# Patient Record
Sex: FEMALE | Race: OTHER | HISPANIC OR LATINO | Employment: FULL TIME | URBAN - METROPOLITAN AREA
[De-identification: names, ages, dates, MRNs, and addresses within clinical notes are randomized per-mention and may not be internally consistent; named-entity substitution may affect disease eponyms.]

---

## 2017-07-25 ENCOUNTER — OFFICE VISIT (OUTPATIENT)
Dept: URGENT CARE | Facility: MEDICAL CENTER | Age: 32
End: 2017-07-25
Payer: MEDICARE

## 2017-07-25 PROCEDURE — 99283 EMERGENCY DEPT VISIT LOW MDM: CPT

## 2017-07-25 PROCEDURE — G0382 LEV 3 HOSP TYPE B ED VISIT: HCPCS

## 2019-02-05 ENCOUNTER — TELEPHONE (OUTPATIENT)
Dept: OBGYN CLINIC | Facility: CLINIC | Age: 34
End: 2019-02-05

## 2021-11-02 ENCOUNTER — OFFICE VISIT (OUTPATIENT)
Dept: URGENT CARE | Facility: MEDICAL CENTER | Age: 36
End: 2021-11-02
Payer: MEDICARE

## 2021-11-02 VITALS
DIASTOLIC BLOOD PRESSURE: 86 MMHG | RESPIRATION RATE: 18 BRPM | SYSTOLIC BLOOD PRESSURE: 156 MMHG | HEART RATE: 80 BPM | OXYGEN SATURATION: 96 % | HEIGHT: 64 IN | TEMPERATURE: 98.2 F | BODY MASS INDEX: 38.41 KG/M2 | WEIGHT: 225 LBS

## 2021-11-02 DIAGNOSIS — R11.0 NAUSEA: ICD-10-CM

## 2021-11-02 DIAGNOSIS — K52.9 NONINFECTIOUS GASTROENTERITIS, UNSPECIFIED TYPE: Primary | ICD-10-CM

## 2021-11-02 LAB
SL AMB  POCT GLUCOSE, UA: NORMAL
SL AMB LEUKOCYTE ESTERASE,UA: NORMAL
SL AMB POCT BILIRUBIN,UA: NORMAL
SL AMB POCT BLOOD,UA: NORMAL
SL AMB POCT CLARITY,UA: CLEAR
SL AMB POCT COLOR,UA: YELLOW
SL AMB POCT KETONES,UA: NORMAL
SL AMB POCT NITRITE,UA: NORMAL
SL AMB POCT PH,UA: 7
SL AMB POCT SPECIFIC GRAVITY,UA: 1.01
SL AMB POCT URINE HCG: NEGATIVE
SL AMB POCT URINE PROTEIN: NORMAL
SL AMB POCT UROBILINOGEN: 0.2

## 2021-11-02 PROCEDURE — 81025 URINE PREGNANCY TEST: CPT

## 2021-11-02 PROCEDURE — 81002 URINALYSIS NONAUTO W/O SCOPE: CPT

## 2021-11-02 PROCEDURE — 99203 OFFICE O/P NEW LOW 30 MIN: CPT | Performed by: PHYSICIAN ASSISTANT

## 2021-11-02 RX ORDER — ONDANSETRON 4 MG/1
4 TABLET, FILM COATED ORAL EVERY 8 HOURS PRN
Qty: 20 TABLET | Refills: 0 | Status: SHIPPED | OUTPATIENT
Start: 2021-11-02

## 2022-10-14 ENCOUNTER — APPOINTMENT (OUTPATIENT)
Dept: PHYSICAL THERAPY | Facility: CLINIC | Age: 37
End: 2022-10-14

## 2023-12-11 ENCOUNTER — APPOINTMENT (EMERGENCY)
Dept: ULTRASOUND IMAGING | Facility: HOSPITAL | Age: 38
End: 2023-12-11
Payer: COMMERCIAL

## 2023-12-11 ENCOUNTER — HOSPITAL ENCOUNTER (EMERGENCY)
Facility: HOSPITAL | Age: 38
Discharge: HOME | End: 2023-12-11
Attending: EMERGENCY MEDICINE
Payer: COMMERCIAL

## 2023-12-11 VITALS
HEART RATE: 77 BPM | DIASTOLIC BLOOD PRESSURE: 57 MMHG | SYSTOLIC BLOOD PRESSURE: 116 MMHG | RESPIRATION RATE: 16 BRPM | TEMPERATURE: 98.5 F | OXYGEN SATURATION: 99 %

## 2023-12-11 DIAGNOSIS — O20.0 THREATENED ABORTION: ICD-10-CM

## 2023-12-11 DIAGNOSIS — Z34.90 INTRAUTERINE PREGNANCY: ICD-10-CM

## 2023-12-11 DIAGNOSIS — R10.30 LOWER ABDOMINAL PAIN: Primary | ICD-10-CM

## 2023-12-11 LAB
ALBUMIN SERPL BCP-MCNC: 4 G/DL (ref 3.5–5)
ALP SERPL-CCNC: 66 U/L (ref 34–104)
ALT SERPL W P-5'-P-CCNC: 22 U/L (ref 7–52)
ANION GAP SERPL CALCULATED.3IONS-SCNC: 7 MMOL/L
AST SERPL W P-5'-P-CCNC: 13 U/L (ref 13–39)
B-HCG SERPL-ACNC: 9310 MIU/ML (ref 0–5)
BASOPHILS # BLD AUTO: 0.05 THOUSANDS/ÂΜL (ref 0–0.1)
BASOPHILS NFR BLD AUTO: 0 % (ref 0–1)
BILIRUB SERPL-MCNC: 0.26 MG/DL (ref 0.2–1)
BILIRUB UR QL STRIP: NEGATIVE
BUN SERPL-MCNC: 10 MG/DL (ref 5–25)
CALCIUM SERPL-MCNC: 9.2 MG/DL (ref 8.4–10.2)
CHLORIDE SERPL-SCNC: 104 MMOL/L (ref 96–108)
CLARITY UR: CLEAR
CO2 SERPL-SCNC: 25 MMOL/L (ref 21–32)
COLOR UR: NORMAL
CREAT SERPL-MCNC: 0.68 MG/DL (ref 0.6–1.3)
EOSINOPHIL # BLD AUTO: 0.3 THOUSAND/ÂΜL (ref 0–0.61)
EOSINOPHIL NFR BLD AUTO: 2 % (ref 0–6)
ERYTHROCYTE [DISTWIDTH] IN BLOOD BY AUTOMATED COUNT: 17.3 % (ref 11.6–15.1)
GFR SERPL CREATININE-BSD FRML MDRD: 112 ML/MIN/1.73SQ M
GLUCOSE SERPL-MCNC: 145 MG/DL (ref 65–140)
GLUCOSE UR STRIP-MCNC: NEGATIVE MG/DL
HCT VFR BLD AUTO: 44 % (ref 34.8–46.1)
HGB BLD-MCNC: 14.6 G/DL (ref 11.5–15.4)
HGB UR QL STRIP.AUTO: NEGATIVE
IMM GRANULOCYTES # BLD AUTO: 0.1 THOUSAND/UL (ref 0–0.2)
IMM GRANULOCYTES NFR BLD AUTO: 1 % (ref 0–2)
KETONES UR STRIP-MCNC: NEGATIVE MG/DL
LEUKOCYTE ESTERASE UR QL STRIP: NEGATIVE
LIPASE SERPL-CCNC: 12 U/L (ref 11–82)
LYMPHOCYTES # BLD AUTO: 4.43 THOUSANDS/ÂΜL (ref 0.6–4.47)
LYMPHOCYTES NFR BLD AUTO: 29 % (ref 14–44)
MCH RBC QN AUTO: 28.2 PG (ref 26.8–34.3)
MCHC RBC AUTO-ENTMCNC: 33.2 G/DL (ref 31.4–37.4)
MCV RBC AUTO: 85 FL (ref 82–98)
MONOCYTES # BLD AUTO: 0.56 THOUSAND/ÂΜL (ref 0.17–1.22)
MONOCYTES NFR BLD AUTO: 4 % (ref 4–12)
NEUTROPHILS # BLD AUTO: 9.95 THOUSANDS/ÂΜL (ref 1.85–7.62)
NEUTS SEG NFR BLD AUTO: 64 % (ref 43–75)
NITRITE UR QL STRIP: NEGATIVE
NRBC BLD AUTO-RTO: 0 /100 WBCS
PH UR STRIP.AUTO: 5.5 [PH]
PLATELET # BLD AUTO: 269 THOUSANDS/UL (ref 149–390)
PMV BLD AUTO: 11 FL (ref 8.9–12.7)
POTASSIUM SERPL-SCNC: 3.5 MMOL/L (ref 3.5–5.3)
PROT SERPL-MCNC: 7.2 G/DL (ref 6.4–8.4)
PROT UR STRIP-MCNC: NEGATIVE MG/DL
RBC # BLD AUTO: 5.18 MILLION/UL (ref 3.81–5.12)
SODIUM SERPL-SCNC: 136 MMOL/L (ref 135–147)
SP GR UR STRIP.AUTO: 1.03 (ref 1–1.03)
UROBILINOGEN UR STRIP-ACNC: <2 MG/DL
WBC # BLD AUTO: 15.39 THOUSAND/UL (ref 4.31–10.16)

## 2023-12-11 PROCEDURE — 76801 OB US < 14 WKS SINGLE FETUS: CPT

## 2023-12-11 PROCEDURE — 80053 COMPREHEN METABOLIC PANEL: CPT | Performed by: EMERGENCY MEDICINE

## 2023-12-11 PROCEDURE — 85025 COMPLETE CBC W/AUTO DIFF WBC: CPT | Performed by: EMERGENCY MEDICINE

## 2023-12-11 PROCEDURE — 81003 URINALYSIS AUTO W/O SCOPE: CPT | Performed by: PHYSICIAN ASSISTANT

## 2023-12-11 PROCEDURE — 87086 URINE CULTURE/COLONY COUNT: CPT | Performed by: PHYSICIAN ASSISTANT

## 2023-12-11 PROCEDURE — 99284 EMERGENCY DEPT VISIT MOD MDM: CPT

## 2023-12-11 PROCEDURE — 87661 TRICHOMONAS VAGINALIS AMPLIF: CPT | Performed by: PHYSICIAN ASSISTANT

## 2023-12-11 PROCEDURE — 87591 N.GONORRHOEAE DNA AMP PROB: CPT | Performed by: PHYSICIAN ASSISTANT

## 2023-12-11 PROCEDURE — 84702 CHORIONIC GONADOTROPIN TEST: CPT | Performed by: EMERGENCY MEDICINE

## 2023-12-11 PROCEDURE — 36415 COLL VENOUS BLD VENIPUNCTURE: CPT

## 2023-12-11 PROCEDURE — 87491 CHLMYD TRACH DNA AMP PROBE: CPT | Performed by: PHYSICIAN ASSISTANT

## 2023-12-11 PROCEDURE — 83690 ASSAY OF LIPASE: CPT | Performed by: EMERGENCY MEDICINE

## 2023-12-11 PROCEDURE — 87563 M. GENITALIUM AMP PROBE: CPT | Performed by: PHYSICIAN ASSISTANT

## 2023-12-11 NOTE — ED PROVIDER NOTES
History  Chief Complaint   Patient presents with    Abdominal Pain     Pt presents with lower abdominal pain x1 day. Pt is approx 2 weeks pregnant with a positive pregnancy test on Dec 1st. Hx of miscarriages and ectopic pregnancies. Denies vaginal bleeding and discharge,        History provided by:  Patient  Abdominal Pain  Pain location:  Suprapubic  Pain quality: aching    Pain quality: not cramping    Pain radiates to:  Does not radiate  Pain severity:  Moderate  Onset quality:  Gradual  Duration:  2 days  Timing:  Constant  Progression:  Worsening  Chronicity:  New  Context: awakening from sleep    Context: not alcohol use, not diet changes, not eating, not laxative use, not medication withdrawal, not previous surgeries, not recent illness, not recent sexual activity, not recent travel, not retching, not sick contacts, not suspicious food intake and not trauma    Relieved by:  Nothing  Worsened by:  Nothing  Ineffective treatments:  None tried  Associated symptoms: no anorexia, no chills, no diarrhea, no dysuria, no fever, no melena, no vaginal bleeding and no vaginal discharge    Risk factors: pregnancy    Risk factors: no alcohol abuse, no aspirin use, not elderly, has not had multiple surgeries, no NSAID use, not obese and no recent hospitalization        Prior to Admission Medications   Prescriptions Last Dose Informant Patient Reported? Taking?   ondansetron (ZOFRAN) 4 mg tablet   No No   Sig: Take 1 tablet (4 mg total) by mouth every 8 (eight) hours as needed for nausea or vomiting      Facility-Administered Medications: None       Past Medical History:   Diagnosis Date    Ectopic pregnancy        Past Surgical History:   Procedure Laterality Date    APPENDECTOMY         History reviewed. No pertinent family history. I have reviewed and agree with the history as documented.     E-Cigarette/Vaping     E-Cigarette/Vaping Substances     Social History     Tobacco Use    Smoking status: Every Day Packs/day: 0.50     Types: Cigarettes    Smokeless tobacco: Never   Substance Use Topics    Alcohol use: Not Currently    Drug use: Never       Review of Systems   Constitutional:  Negative for chills and fever. Gastrointestinal:  Positive for abdominal pain. Negative for anorexia, diarrhea and melena. Genitourinary:  Negative for dysuria, vaginal bleeding and vaginal discharge. Physical Exam  Physical Exam    Vital Signs  ED Triage Vitals   Temperature Pulse Respirations Blood Pressure SpO2   12/11/23 1248 12/11/23 1248 12/11/23 1248 12/11/23 1248 12/11/23 1248   98.5 °F (36.9 °C) 66 18 144/70 99 %      Temp Source Heart Rate Source Patient Position - Orthostatic VS BP Location FiO2 (%)   12/11/23 1248 12/11/23 1248 12/11/23 1248 12/11/23 1248 --   Oral Monitor Sitting Right arm       Pain Score       12/11/23 1337       7           Vitals:    12/11/23 1248 12/11/23 1337 12/11/23 1400 12/11/23 1445   BP: 144/70 127/72 128/82 116/57   Pulse: 66 69 85 77   Patient Position - Orthostatic VS: Sitting            Visual Acuity      ED Medications  Medications - No data to display    Diagnostic Studies  Results Reviewed       Procedure Component Value Units Date/Time    Trichomonas vaginalis/Mycoplasma genitalium PCR [[id number]] Collected: 12/11/23 1622    Lab Status: In process Specimen: Urine, Clean Catch Updated: 12/11/23 1752    UA w Reflex to Microscopic w Reflex to Culture [[id number]] Collected: 12/11/23 1620    Lab Status: Final result Specimen: Urine, Clean Catch Updated: 12/11/23 1701     Color, UA Light Yellow     Clarity, UA Clear     Specific Gravity, UA 1.028     pH, UA 5.5     Leukocytes, UA Negative     Nitrite, UA Negative     Protein, UA Negative mg/dl      Glucose, UA Negative mg/dl      Ketones, UA Negative mg/dl      Urobilinogen, UA <2.0 mg/dl      Bilirubin, UA Negative     Occult Blood, UA Negative     URINE COMMENT --    Urine culture [[id number]] Collected: 12/11/23 1620    Lab Status:  In process Specimen: Urine, Clean Catch Updated: 12/11/23 1700    Chlamydia/GC amplified DNA by PCR [[id number]] Collected: 12/11/23 1622    Lab Status: In process Specimen: Urine, Other Updated: 12/11/23 1630    hCG, quantitative, pregnancy [[id number]]  (Abnormal) Collected: 12/11/23 1335    Lab Status: Final result Specimen: Blood from Arm, Right Updated: 12/11/23 1445     HCG, Quant 9,310 mIU/mL     Narrative:       Expected Ranges:    HCG results between 5 and 25 mIU/mL may be indicative of early pregnancy but should be interpreted in light of the total clinical presentation. HCG can rise to detectable levels in malick and post menopausal women (0-11.6 mIU/mL).      Approximate               Approximate HCG  Gestation age          Concentration ( mIU/mL)  _____________          ______________________   Duc Roch                      HCG values  0.2-1                       5-50  1-2                           2-3                         100-5000  3-4                         500-58933  4-5                         1000-37567  5-6                         [id number]  6-8                         82092-004012  8-12                        [id number]      Comprehensive metabolic panel [[id number]]  (Abnormal) Collected: 12/11/23 1335    Lab Status: Final result Specimen: Blood from Arm, Right Updated: 12/11/23 1417     Sodium 136 mmol/L      Potassium 3.5 mmol/L      Chloride 104 mmol/L      CO2 25 mmol/L      ANION GAP 7 mmol/L      BUN 10 mg/dL      Creatinine 0.68 mg/dL      Glucose 145 mg/dL      Calcium 9.2 mg/dL      AST 13 U/L      ALT 22 U/L      Alkaline Phosphatase 66 U/L      Total Protein 7.2 g/dL      Albumin 4.0 g/dL      Total Bilirubin 0.26 mg/dL      eGFR 112 ml/min/1.73sq m     Narrative:      [place] guidelines for Chronic Kidney Disease (CKD):     Stage 1 with normal or high GFR (GFR > 90 mL/min/1.73 square meters)    Stage 2 Mild CKD (GFR = 60-89 mL/min/1.73 square meters)    Stage 3A Moderate CKD (GFR = 45-59 mL/min/1.73 square meters)    Stage 3B Moderate CKD (GFR = 30-44 mL/min/1.73 square meters)    Stage 4 Severe CKD (GFR = 15-29 mL/min/1.73 square meters)    Stage 5 End Stage CKD (GFR <15 mL/min/1.73 square meters)  Note: GFR calculation is accurate only with a steady state creatinine    Lipase [[id number]]  (Normal) Collected: 12/11/23 1335    Lab Status: Final result Specimen: Blood from Arm, Right Updated: 12/11/23 1417     Lipase 12 u/L     CBC and differential [[id number]]  (Abnormal) Collected: 12/11/23 1335    Lab Status: Final result Specimen: Blood from Arm, Right Updated: 12/11/23 1350     WBC 15.39 Thousand/uL      RBC 5.18 Million/uL      Hemoglobin 14.6 g/dL      Hematocrit 44.0 %      MCV 85 fL      MCH 28.2 pg      MCHC 33.2 g/dL      RDW 17.3 %      MPV 11.0 fL      Platelets 497 Thousands/uL      nRBC 0 /100 WBCs      Neutrophils Relative 64 %      Immat GRANS % 1 %      Lymphocytes Relative 29 %      Monocytes Relative 4 %      Eosinophils Relative 2 %      Basophils Relative 0 %      Neutrophils Absolute 9.95 Thousands/µL      Immature Grans Absolute 0.10 Thousand/uL      Lymphocytes Absolute 4.43 Thousands/µL      Monocytes Absolute 0.56 Thousand/µL      Eosinophils Absolute 0.30 Thousand/µL      Basophils Absolute 0.05 Thousands/µL                    US OB < 14 weeks with transvaginal   Final Result by Mary Lou Foster MD (12/11 1546)      Single live intrauterine gestation at 5 weeks 5 days (range +/- 2 days). VIBHA of 08/07/2024. Workstation performed: [id number]                    Procedures  Procedures         ED Course                               SBIRT 22yo+      Flowsheet Row Most Recent Value   Initial Alcohol Screen: US AUDIT-C     1. How often do you have a drink containing alcohol? 0 Filed at: 12/11/2023 1341   2. How many drinks containing alcohol do you have on a typical day you are drinking?   0 Filed at: 12/11/2023 1341   3b. FEMALE Any Age, or MALE 65+: How often do you have 4 or more drinks on one occassion? 0 Filed at: 2023 1341   Audit-C Score 0 Filed at: 2023 1341   MARIA ESTHER: How many times in the past year have you. .. Used an illegal drug or used a prescription medication for non-medical reasons? Never Filed at: 2023 1341                      Medical Decision Making  Patient presents to the emergency room with complaints of lower abdominal pain. She states that she is pregnant. She is unsure when her last period was. She denies any vaginal bleeding. She has had multiple pregnancies with previous ectopics. She has had 2 viable births1 age 10 and 3 age 18. Patient has had no prenatal care up to this point. Denies any fever or chills. Physical exam this 30-year-old  female is alert oriented x 3. She is in no acute distress. Her lungs are clear to auscultation. Heart is regular rate and rhythm without murmur. Her abdomen is soft nondistended with minimal tenderness palpated over the suprapubic area. There is no evidence of a mass or hepatosplenomegaly. No dependent edema. Ultrasound of the pelvis, transvaginal shows a intrauterine pregnancy at 5 weeks 5 days. Impression  Intrauterine pregnancy  Lower abdominal pain  Threatened AB    Plan-  Patient is good to make a follow-up appoint with her gynecologist.  She was given reasons to return to the emergency room should her symptoms worsen. Amount and/or Complexity of Data Reviewed  Labs: ordered. Details: Independently interpreted by me patient is O+. Her Rh and ABL was reviewed by me. Radiology: ordered. Details: Transvaginal ultrasound shows a viable intrauterine pregnancy. There is no adnexal mass identified.              Disposition  Final diagnoses:   Lower abdominal pain   Intrauterine pregnancy   Threatened      Time reflects when diagnosis was documented in both MDM as applicable and the Disposition within this note       Time User Action Codes Description Comment    2023  4:15 PM Nas Mckeon Add [R10.30] Lower abdominal pain     2023  4:15 PM Nas Mckeon Add [B40.09] Intrauterine pregnancy     2023  4:16 PM Nas Mckeon Add [O20.0] Threatened            ED Disposition       ED Disposition   Discharge    Condition   Stable    Date/Time   Mon Dec 11, 2023 [address] discharge to home/self care. Follow-up Information       Follow up With Specialties Details Why Contact Info    Your Ob Physician    Call and arrange an appointment  for next week            Discharge Medication List as of 2023  4:18 PM        CONTINUE these medications which have NOT CHANGED    Details   ondansetron (ZOFRAN) 4 mg tablet Take 1 tablet (4 mg total) by mouth every 8 (eight) hours as needed for nausea or vomiting, Starting 2021, Normal             No discharge procedures on file.     PDMP Review       None            ED Provider  Electronically Signed by             Norm Coyle PA-C  23

## 2023-12-11 NOTE — ED PROCEDURE NOTE
Procedure  POC Pelvic US    Date/Time: 12/11/2023 3:38 PM    Performed by: Irvin Garcia MD  Authorized by: Irvin Garcia MD    Patient location:  ED  Procedure performed by consultant: Medical Student Radha. Procedure details:     Exam Type:  Diagnostic    Indications: evaluate for IUP      Assessment for: confirm intrauterine pregnancy      Technique:  Transabdominal obstetric (HCG+) exam    Views obtained: uterus (transverse and sagittal) and pouch of Juan Antonio      Views obtained comment:  Limited views of adnexa    Image quality: limited diagnostic      Image availability:  Images available in PACS  Uterine findings:     Endometrial stripe: identified      Intrauterine pregnancy: identified      Yolk sac: identified      Fetal heart rate: identified (Limited visualization, unable to measure)    Right adnexa findings:     Right ovary:  Limited quality  Left adnexa findings:     Left ovary:  Limited quality  Other findings:     Free pelvic fluid: not identified    Interpretation:     Ultrasound impressions: normal      Pregnancy findings: intrauterine pregnancy (IUP)    Comments:      Gestational sac with yolk sac visualized in uterus. Limited view of adnexa. Patient tolerated exam well, no immediate issues. POC Biliary US    Date/Time: 12/11/2023 3:38 PM    Performed by: Irvin Garcia MD  Authorized by: Irvin Garcia MD    Patient location:  ED  Performed by:  Resident  Procedure performed by consultant: Medical chikis Garcia.     Procedure details:     Exam Type:  Diagnostic    Indications comment:  Abdominal pain    Assessment for:  Cholecystitis and cholelithiasis    Views obtained: gallbladder (transverse and longitudinal)      Image quality: limited diagnostic      Image availability:  Images available in PACS  Findings:     Cholelithiasis: not identified      Common bile duct:  Unable to visualize    Gallbladder wall:  Normal    Gallbladder wall thickness (mm):  1 Pericholecystic fluid: not identified      Sonographic Norton's sign: negative    Interpretation:     Biliary ultrasound impressions: normal gallbladder ultrasound    Comments:      Patient tolerated exam well, no immediate issues. POC Renal US    Date/Time: 12/11/2023 3:38 PM    Performed by: Thais Peña MD  Authorized by: Thais Peña MD    Patient location:  ED  Performed by:  Resident  Procedure performed by consultant: Medical student Radha. Procedure details:     Exam Type:  Diagnostic    Indications: abdominal pain      Assessment for:  Suspected hydronephrosis    Views obtained: bladder (transverse and sagittal), left kidney and right kidney      Image quality: diagnostic      Image availability:  Images available in PACS  Findings:     LEFT kidney findings: unremarkable      LEFT hydronephrosis: none      RIGHT kidney findings: unremarkable      RIGHT hydronephrosis: none      Bladder:  Limited quality  Interpretation:     Renal ultrasound impressions: normal exam    Comments:      Patient reports recently urinating, limited view of bladder. Patient tolerated exam well, no immediate issues.                    Thais Peña MD  12/11/23 8526

## 2023-12-12 LAB
C TRACH DNA SPEC QL NAA+PROBE: NEGATIVE
M GENITALIUM DNA SPEC QL NAA+PROBE: NEGATIVE
N GONORRHOEA DNA SPEC QL NAA+PROBE: NEGATIVE
T VAGINALIS DNA SPEC QL NAA+PROBE: NEGATIVE

## 2023-12-13 LAB — BACTERIA UR CULT: NORMAL

## 2023-12-26 ENCOUNTER — ULTRASOUND (OUTPATIENT)
Dept: OBGYN CLINIC | Facility: CLINIC | Age: 38
End: 2023-12-26
Payer: COMMERCIAL

## 2023-12-26 VITALS — DIASTOLIC BLOOD PRESSURE: 72 MMHG | SYSTOLIC BLOOD PRESSURE: 116 MMHG | BODY MASS INDEX: 36.97 KG/M2 | WEIGHT: 215.4 LBS

## 2023-12-26 DIAGNOSIS — N91.1 SECONDARY AMENORRHEA: ICD-10-CM

## 2023-12-26 DIAGNOSIS — Z34.90 EARLY STAGE OF PREGNANCY: Primary | ICD-10-CM

## 2023-12-26 DIAGNOSIS — O21.9 NAUSEA/VOMITING IN PREGNANCY: ICD-10-CM

## 2023-12-26 PROCEDURE — 99203 OFFICE O/P NEW LOW 30 MIN: CPT | Performed by: STUDENT IN AN ORGANIZED HEALTH CARE EDUCATION/TRAINING PROGRAM

## 2023-12-26 PROCEDURE — 76817 TRANSVAGINAL US OBSTETRIC: CPT | Performed by: STUDENT IN AN ORGANIZED HEALTH CARE EDUCATION/TRAINING PROGRAM

## 2023-12-26 NOTE — PROGRESS NOTES
Ultrasound Probe Disinfection    A transvaginal ultrasound was performed.   Prior to use, disinfection was performed with High Level Disinfection Process ([employer]).  Probe serial number [id number]  was used.    Early stage of pregnancy  37yo  at 8.2wks by BSUS today, not consistent with LMP, VIBHA 24, presents for dating US    Pt reports one episode of vaginal spotting with wiping this AM. Admits to mild nausea. Denies pelvic cramping.     -continue PNVs   -bleeding precautions provide d  -return for M US and initial prenatal visit     Nausea/vomiting in pregnancy  -unisom and vit B6 recommended    ROS: admits to nausea, denies headaches, changes in vision, chest pain, palpitations, shortness of breath, vomiting, fevers, chills     PE:  General: alert and oriented, resting comfortably, no acute distress  Cardiac: regular rate  Pulmonary: no respiratory distress  Abdomen: soft, nondistended, no rebound or guarding, nontender   : external vulva without lesions, redness, tenderness   Extremities: no edema or calf tenderness bilaterally       Nisha Peralta,   23  2:05 PM

## 2023-12-26 NOTE — ASSESSMENT & PLAN NOTE
37yo  at 8.2wks by BSUS today, not consistent with LMP, VIBHA 24, presents for dating US    Pt reports one episode of vaginal spotting with wiping this AM. Admits to mild nausea. Denies pelvic cramping.     -continue PNVs   -bleeding precautions provide d  -return for MFM US and initial prenatal visit

## 2024-01-09 ENCOUNTER — INITIAL PRENATAL (OUTPATIENT)
Dept: OBGYN CLINIC | Facility: CLINIC | Age: 39
End: 2024-01-09

## 2024-01-09 VITALS — HEIGHT: 64 IN | WEIGHT: 215 LBS | BODY MASS INDEX: 36.7 KG/M2

## 2024-01-09 DIAGNOSIS — Z34.81 PRENATAL CARE, SUBSEQUENT PREGNANCY, FIRST TRIMESTER: Primary | ICD-10-CM

## 2024-01-09 DIAGNOSIS — Z31.430 ENCOUNTER OF FEMALE FOR TESTING FOR GENETIC DISEASE CARRIER STATUS FOR PROCREATIVE MANAGEMENT: ICD-10-CM

## 2024-01-09 DIAGNOSIS — Z36.9 ENCOUNTER FOR ANTENATAL SCREENING OF MOTHER: ICD-10-CM

## 2024-01-09 PROCEDURE — OBC

## 2024-01-09 NOTE — PATIENT INSTRUCTIONS
Congratulations!! Please review our Pregnancy Essential Guide and [facility] L&D Virtual tour from our networks website.     [facility] Pregnancy Essentials Guide  [facility] ([email or web address])     [facility] - Virtual Tour (SleepOut)     [facility] ([email or web address]) (Lab Locations)

## 2024-01-09 NOTE — PROGRESS NOTES
OB INTAKE INTERVIEW  Patient is 38 y.o.y.o. who presents for OB intake at 10wks  She is accompanied by Daniel Dowling during this encounter  The father of her baby (Daniel Dowling) is involved in the pregnancy and is 33 years old.      Last Menstrual Period: 10/15/23  Ultrasound: Measured 8 weeks 2 days on 23  Estimated Date of Delivery: 2024 changed by 8 week US    Signs/Symptoms of Pregnancy  Current pregnancy symptoms: nausea no vomiting, fatigue  Constipation YES, advised colace/miralax PRN   Headaches YES, prior to pregnancy. Not too bad so far  Cramping/spotting YES, spotting x 1 day of US appointment. resolved  PICA cravings no    Diabetes-  Body mass index is 36.9 kg/m².  If patient has 1 or more, please order early 1 hour GTT  History of GDM no  BMI >35 YES  History of PCOS or current metformin use no  History of LGA/macrosomic infant (4000g/9lbs) no    If patient has 2 or more, please order early 1 hour GTT  BMI>30 YES  AMA YES  First degree relative with type 2 diabetes YES  History of chronic HTN, hyperlipidemia, elevated A1C no  High risk race (, , ,  or ) YES    Hypertension- if you answer yes, please order preeclampsia labs (cbc, comprehensive metabolic panel, urine protein creatinine ratio, uric acid)  History of of chronic HTN no  History of gestational HTN no  History of preeclampsia, eclampsia, or HELLP syndrome no  History of diabetes no  History of lupus, autoimmune disease, kidney disease no    Thyroid- if yes order TSH with reflex T4  History of thyroid disease no    Bleeding Disorder or Hx of DVT-patient or first degree relative with history of. Order the following if not done previously.   (Factor V, antithrombin III, prothrombin gene mutation, protein C and S Ag, lupus anticoagulant, anticardiolipin, beta-2 glycoprotein)   no    OB/GYN-  History of abnormal pap smear no       Date of last pap smear unknown a good 7+years  possibly  History of HPV no  History of Herpes/HSV no  History of other STI (gonorrhea, chlamydia, trich) no  History of prior  YES x2   History of prior  no  History of  delivery prior to 36 weeks 6 days no  History of blood transfusion no  Ok for blood transfusion YES    Substance screening- if yes outside of tobacco for her or anyone in her home-order urine drug screen  History of tobacco use YES  Currently using tobacco no  Currently using alcohol no  Presently using drugs no  Past drug use  YES, medical Marijuana for anxiety/depression quit in December - patient aware of UDS screening  IV drug use- no  Partner drug use YES, medical marijuana  Parent/Family drug use yes, MOB mother and brother both     MRSA Screening-   Does the pt have a hx of MRSA? no  If yes- please follow MRSA protocol and obtain a nasal swab for MRSA culture    Immunizations:  Influenza vaccine given this season no, declines  Discussed Tdap vaccine yes  Discussed COVID Vaccine no, decline    Genetic/MFM-  Do you or your partner have a history of any of the following in yourselves or first degree relatives?  Cystic fibrosis no  Spinal muscular atrophy no  Hemoglobinopathy/Sickle Cell/Thalassemia no  Fragile X Intellectual Disability no    If yes, discuss Carrier Screening and recommend consultation with MFM/genetic counseling and place specific [facility] Referral for.  If no, discuss Carrier Screening being completed once in a lifetime as a standard of care lab test along with Nuchal Ultrasound. Place orders for [id number] and [id number] along with M Referral.  Patient interested yes    Appointment at [facility] made yes, appointment     Interview education  [facility] Pregnancy Essentials Book reviewed, discussed and attached to their AVS yes    Nurse/Family Partnership- patient may qualify no; referral placed no    Prenatal lab work scripts yes  Extra labs ordered:  1 hr gtt  Cf/sma    Aspirin/Preeclampsia Screen    Risk Level  "Risk Factor Recommendation   LOW Prior Uncomplicated full-term delivery YES No Aspirin recommendation        MODERATE Nulliparity no Recommend low-dose aspirin if     BMI>30 YES 2 or more moderate risk factors    Family History Preeclampsia (mother/sister) no     35yr old or greater YES      or Low Socioeconomic no     IVF Pregnancy  no     Personal History Risks (low birth weight, prior adverse preg outcome, >10yr preg interval) YES         HIGH History of Preeclampsia no Recommend low-dose aspirin if     Multifetal gestation no 1 or more high risk factors    Chronic HTN no     Type 1 or 2 Diabetes no     Renal Disease no     Autoimmune Disease  no      Contraindications to ASA therapy:  NSAID/ ASA allergy: no  Nasal polyps: no  Asthma with history of ASA induced bronchospasm: no  Relative contraindications:  History of GI bleed: no  Active peptic ulcer disease: no  Severe hepatic dysfunction: no    Patient should be recommended to take ASA 162mg during this pregnancy from 12-36wks to lower her risk of preeclampsia: Moderate risk criteria, aware ASA therapy.       The patient has a history now or in prior pregnancy notable for:  Hx of ectopic pregnancy 2015- required surgery, hx of medical marijuana use in pregnancy , Patient reports  painful \"boils\" that flare up prior to menses and with summer heat- appear on breast (under), axillary, and buttocks (not sure if its HS, was never tested, was concerned if they would be a problem in pregnancy), hx of shoulder dystocia (2013)      Details that I feel the provider should be aware of: This pregnancy was not prevented for Aleksander, this is their first child together, they both have children from previous relationships. Verito has two children Miguelito (17) and Katy (10) both delivered via spontaneous vaginal delivery. Her last delivery did have a shoulder dystocia. She is overall feeling well has some nausea, but not vomiting, she is fatigued " and she has some constipation, which she had prior to pregnancy as well. She has a history of headaches prior to pregnancy but so far they have been good with this pregnancy. She has a history of anxiety and depression which she was using medical marijuana for, she has since quit (aware of UDS in labs) advised of therapist and counselors at [facility] if she was interested. She is aware of scheduling phone call for CF.SMA and pn1 labs to be done prior to 1/30 appointment. She is always aware of early 1 hr gtt testing.     PN1 visit scheduled. The patient was oriented to our practice, reviewed delivering physicians and [facility] for Delivery. All questions were answered.    Interviewed by: Fanny Larson RN

## 2024-01-11 ENCOUNTER — APPOINTMENT (OUTPATIENT)
Dept: LAB | Facility: MEDICAL CENTER | Age: 39
End: 2024-01-11
Payer: COMMERCIAL

## 2024-01-11 DIAGNOSIS — Z34.81 PRENATAL CARE, SUBSEQUENT PREGNANCY, FIRST TRIMESTER: ICD-10-CM

## 2024-01-11 LAB
ABO GROUP BLD: NORMAL
AMPHETAMINES SERPL QL SCN: NEGATIVE
BACTERIA UR QL AUTO: ABNORMAL /HPF
BARBITURATES UR QL: NEGATIVE
BASOPHILS # BLD AUTO: 0.03 THOUSANDS/ÂΜL (ref 0–0.1)
BASOPHILS NFR BLD AUTO: 0 % (ref 0–1)
BENZODIAZ UR QL: NEGATIVE
BILIRUB UR QL STRIP: NEGATIVE
BLD GP AB SCN SERPL QL: NEGATIVE
CLARITY UR: CLEAR
COCAINE UR QL: NEGATIVE
COLOR UR: ABNORMAL
EOSINOPHIL # BLD AUTO: 0.18 THOUSAND/ÂΜL (ref 0–0.61)
EOSINOPHIL NFR BLD AUTO: 2 % (ref 0–6)
ERYTHROCYTE [DISTWIDTH] IN BLOOD BY AUTOMATED COUNT: 15.9 % (ref 11.6–15.1)
GLUCOSE UR STRIP-MCNC: ABNORMAL MG/DL
HBV SURFACE AG SER QL: NORMAL
HCT VFR BLD AUTO: 42.5 % (ref 34.8–46.1)
HCV AB SER QL: NORMAL
HGB BLD-MCNC: 13.6 G/DL (ref 11.5–15.4)
HGB UR QL STRIP.AUTO: NEGATIVE
HIV 1+2 AB+HIV1 P24 AG SERPL QL IA: NORMAL
HIV 2 AB SERPL QL IA: NORMAL
HIV1 AB SERPL QL IA: NORMAL
HIV1 P24 AG SERPL QL IA: NORMAL
IMM GRANULOCYTES # BLD AUTO: 0.04 THOUSAND/UL (ref 0–0.2)
IMM GRANULOCYTES NFR BLD AUTO: 0 % (ref 0–2)
KETONES UR STRIP-MCNC: NEGATIVE MG/DL
LEUKOCYTE ESTERASE UR QL STRIP: ABNORMAL
LYMPHOCYTES # BLD AUTO: 2.59 THOUSANDS/ÂΜL (ref 0.6–4.47)
LYMPHOCYTES NFR BLD AUTO: 26 % (ref 14–44)
MCH RBC QN AUTO: 27 PG (ref 26.8–34.3)
MCHC RBC AUTO-ENTMCNC: 32 G/DL (ref 31.4–37.4)
MCV RBC AUTO: 85 FL (ref 82–98)
METHADONE UR QL: NEGATIVE
MONOCYTES # BLD AUTO: 0.47 THOUSAND/ÂΜL (ref 0.17–1.22)
MONOCYTES NFR BLD AUTO: 5 % (ref 4–12)
MUCOUS THREADS UR QL AUTO: ABNORMAL
NEUTROPHILS # BLD AUTO: 6.83 THOUSANDS/ÂΜL (ref 1.85–7.62)
NEUTS SEG NFR BLD AUTO: 67 % (ref 43–75)
NITRITE UR QL STRIP: NEGATIVE
NON-SQ EPI CELLS URNS QL MICRO: ABNORMAL /HPF
NRBC BLD AUTO-RTO: 0 /100 WBCS
OPIATES UR QL SCN: NEGATIVE
OXYCODONE+OXYMORPHONE UR QL SCN: NEGATIVE
PCP UR QL: NEGATIVE
PH UR STRIP.AUTO: 6 [PH]
PLATELET # BLD AUTO: 281 THOUSANDS/UL (ref 149–390)
PMV BLD AUTO: 11.3 FL (ref 8.9–12.7)
PROT UR STRIP-MCNC: NEGATIVE MG/DL
RBC # BLD AUTO: 5.03 MILLION/UL (ref 3.81–5.12)
RBC #/AREA URNS AUTO: ABNORMAL /HPF
RH BLD: POSITIVE
RUBV IGG SERPL IA-ACNC: 48.8 IU/ML
SP GR UR STRIP.AUTO: 1.02 (ref 1–1.03)
THC UR QL: POSITIVE
TREPONEMA PALLIDUM IGG+IGM AB [PRESENCE] IN SERUM OR PLASMA BY IMMUNOASSAY: NORMAL
UROBILINOGEN UR STRIP-ACNC: <2 MG/DL
WBC # BLD AUTO: 10.14 THOUSAND/UL (ref 4.31–10.16)
WBC #/AREA URNS AUTO: ABNORMAL /HPF

## 2024-01-11 PROCEDURE — 36415 COLL VENOUS BLD VENIPUNCTURE: CPT

## 2024-01-11 PROCEDURE — 86900 BLOOD TYPING SEROLOGIC ABO: CPT

## 2024-01-11 PROCEDURE — 85025 COMPLETE CBC W/AUTO DIFF WBC: CPT

## 2024-01-11 PROCEDURE — 87389 HIV-1 AG W/HIV-1&-2 AB AG IA: CPT

## 2024-01-11 PROCEDURE — 86901 BLOOD TYPING SEROLOGIC RH(D): CPT

## 2024-01-11 PROCEDURE — 86803 HEPATITIS C AB TEST: CPT

## 2024-01-11 PROCEDURE — 80307 DRUG TEST PRSMV CHEM ANLYZR: CPT

## 2024-01-11 PROCEDURE — 86780 TREPONEMA PALLIDUM: CPT

## 2024-01-11 PROCEDURE — 87340 HEPATITIS B SURFACE AG IA: CPT

## 2024-01-11 PROCEDURE — 86850 RBC ANTIBODY SCREEN: CPT

## 2024-01-11 PROCEDURE — 81001 URINALYSIS AUTO W/SCOPE: CPT

## 2024-01-11 PROCEDURE — 86762 RUBELLA ANTIBODY: CPT

## 2024-01-11 PROCEDURE — 87086 URINE CULTURE/COLONY COUNT: CPT

## 2024-01-12 ENCOUNTER — TELEPHONE (OUTPATIENT)
Dept: OBGYN CLINIC | Facility: CLINIC | Age: 39
End: 2024-01-12

## 2024-01-12 PROBLEM — F12.10 TETRAHYDROCANNABINOL (THC) USE DISORDER, MILD, ABUSE: Status: ACTIVE | Noted: 2024-01-12

## 2024-01-12 LAB — BACTERIA UR CULT: NORMAL

## 2024-01-12 NOTE — TELEPHONE ENCOUNTER
----- Message from Nisha Peralta DO sent at 1/12/2024  1:25 PM EST -----  Please let patient know her prenatal labs are within normal limits. She did come back positive for THC use on UDS. Please advise cessation in pregnancy. We will retest later in pregnancy. Thanks.

## 2024-01-26 ENCOUNTER — APPOINTMENT (OUTPATIENT)
Dept: LAB | Facility: MEDICAL CENTER | Age: 39
End: 2024-01-26
Payer: COMMERCIAL

## 2024-01-26 DIAGNOSIS — Z34.81 PRENATAL CARE, SUBSEQUENT PREGNANCY, FIRST TRIMESTER: ICD-10-CM

## 2024-01-26 LAB — GLUCOSE 1H P 50 G GLC PO SERPL-MCNC: 285 MG/DL (ref 40–134)

## 2024-01-26 PROCEDURE — 36415 COLL VENOUS BLD VENIPUNCTURE: CPT

## 2024-01-26 PROCEDURE — 82950 GLUCOSE TEST: CPT

## 2024-01-29 ENCOUNTER — TELEPHONE (OUTPATIENT)
Dept: OBGYN CLINIC | Facility: CLINIC | Age: 39
End: 2024-01-29

## 2024-01-29 ENCOUNTER — ROUTINE PRENATAL (OUTPATIENT)
Dept: PERINATAL CARE | Facility: OTHER | Age: 39
End: 2024-01-29
Payer: COMMERCIAL

## 2024-01-29 VITALS
BODY MASS INDEX: 38.28 KG/M2 | HEIGHT: 64 IN | DIASTOLIC BLOOD PRESSURE: 76 MMHG | WEIGHT: 224.2 LBS | SYSTOLIC BLOOD PRESSURE: 116 MMHG | HEART RATE: 94 BPM

## 2024-01-29 DIAGNOSIS — O99.810 ABNORMAL GLUCOSE AFFECTING PREGNANCY: Primary | ICD-10-CM

## 2024-01-29 DIAGNOSIS — E66.09 OTHER OBESITY DUE TO EXCESS CALORIES AFFECTING PREGNANCY, ANTEPARTUM: ICD-10-CM

## 2024-01-29 DIAGNOSIS — O24.911 DIABETES MELLITUS AFFECTING PREGNANCY, FIRST TRIMESTER: ICD-10-CM

## 2024-01-29 DIAGNOSIS — Z36.82 ENCOUNTER FOR NUCHAL TRANSLUCENCY TESTING: ICD-10-CM

## 2024-01-29 DIAGNOSIS — O09.521 MULTIGRAVIDA OF ADVANCED MATERNAL AGE IN FIRST TRIMESTER: Primary | ICD-10-CM

## 2024-01-29 DIAGNOSIS — Z3A.13 13 WEEKS GESTATION OF PREGNANCY: ICD-10-CM

## 2024-01-29 DIAGNOSIS — O99.210 OTHER OBESITY DUE TO EXCESS CALORIES AFFECTING PREGNANCY, ANTEPARTUM: ICD-10-CM

## 2024-01-29 DIAGNOSIS — N91.1 SECONDARY AMENORRHEA: ICD-10-CM

## 2024-01-29 PROBLEM — Z83.3 FAMILY HISTORY OF DIABETES MELLITUS IN MOTHER: Status: ACTIVE | Noted: 2020-07-22

## 2024-01-29 PROCEDURE — 36415 COLL VENOUS BLD VENIPUNCTURE: CPT | Performed by: OBSTETRICS & GYNECOLOGY

## 2024-01-29 PROCEDURE — 99204 OFFICE O/P NEW MOD 45 MIN: CPT | Performed by: OBSTETRICS & GYNECOLOGY

## 2024-01-29 PROCEDURE — 76801 OB US < 14 WKS SINGLE FETUS: CPT | Performed by: OBSTETRICS & GYNECOLOGY

## 2024-01-29 PROCEDURE — 76813 OB US NUCHAL MEAS 1 GEST: CPT | Performed by: OBSTETRICS & GYNECOLOGY

## 2024-01-29 RX ORDER — ASPIRIN 81 MG/1
162 TABLET, CHEWABLE ORAL DAILY
Qty: 180 TABLET | Refills: 1 | Status: SHIPPED | OUTPATIENT
Start: 2024-01-29

## 2024-01-29 NOTE — TELEPHONE ENCOUNTER
----- Message from Nisha Peralta DO sent at 1/29/2024 12:39 PM EST -----  Please let patient know she has diabetes in pregnancy due to her glucose > 200 on 1hr GTT. She will need diabetic pathways. Thanks!

## 2024-01-29 NOTE — LETTER
2024     Nisha Peralta DO  [address] [place] [id number]    Patient: Verito Mata   YOB: 1985   Date of Visit: 2024       Dear Dr. Peralta:    Thank you for referring Verito Mata to me for evaluation. Below are my notes for this consultation.    If you have questions, please do not hesitate to call me. I look forward to following your patient along with you.         Sincerely,        Ton Mandel MD        CC: No Recipients    Ton Mandel MD  2024  8:52 AM  Sign when Signing Visit  Verito presents today for a genetic screening ultrasound.  This is her seventh pregnancy.  She has a history of 2 full-term vaginal deliveries last of which was complicated by a possible shoulder dystocia.  She indicates that she stopped pushing and required some help with delivery without any fetal or  injury.  The patient recently underwent an early 1 hour Glucola given strong family history of diabetes and risk factors.  Her 1 hour Glucola was very high at 285 mg/dL indicating that the patient likely has pregestational diabetes.  A hemoglobin A1c was ordered.  She recently quit cigarette use.  Family history is significant for diabetes and addiction.  A review of systems is otherwise negative.    We discussed the options for genetic screening, including but not limited to first trimester screening, second trimester screening, combined first and second trimester screening, noninvasive prenatal screening (NIPS) for patients at high risk and diagnostic screening through the use of CVS and amniocentesis. We discussed the risks and benefits of each approach including the sensitivities and false positive rates as well as the difference between a screening test and a diagnostic test. At the conclusion of our discussion the patient elected noninvasive prenatal testing utilizing the [employer] Non-invasive prenatal screening (NIPS) test. The patient had  this blood work drawn in the office and the results should be available approximately 7-10 days after her blood draw. Her results will be reported from [facility].    We discussed that pregnancies complicated by DM are associated with increased  and maternal morbidity. Fetal risks include structural birth defects, macrosomia, shoulder dystocia, birth injuries, hypoglycemia, hyperbilirubinemia, stillbirth, and potential long-term sequelae such as obesity and impaired intellectual achievement. Maternal risks include preeclampsia and hypertensive disorders of pregnancy, and operative delivery. Aggressive treatment of DM with diet, exercise, and medications including insulin and sometimes metformin have been shown to decrease fetal and  morbidity and mortality. We have a diabetes in pregnancy program where we follow our patients closely to help achieve optimal maternal and  outcomes. Recommend the patient be referred to this program for management.  I have ordered a hemoglobin A1c.    Given the patient's history of maternal age, diabetes, and BMI, I recommend initiating low dose aspirin therapy.  A recent meta-analysis yielded risk reductions of 24% for preeclampsia, 20% for intrauterine growth restriction, and 14% for  birth, with an absolute risk reduction of 2-5% for preeclampsia, one to 5% for intrauterine growth restriction, and 2-4% for  birth.  In this study, there was no identified risk of harm to the mother or fetus but long-term evidence was somewhat limited.  Given the overall safety profile and risk-benefit analysis, I recommend 162 mg of aspirin be taken Daily and discontinued at around 36 weeks gestation or 2-3 weeks prior to planned delivery.   I reviewed these recommendations with the patient and answered all of her questions to apparent satisfaction.    The implications of obesity and pregnancy are significant.  The level of obesity is directly related to the risk of  "adverse pregnancy outcomes including but not limited to, risk of diabetes, hypertensive disorders of pregnancy, macrosomia, intrauterine growth restriction, labor and shoulder dystocias,  section, and increased risk of stillbirth.  Recommend discussing the current weight gain recommendations for women with obesity and discussing good dietary practices as well as the safety of exercise in pregnancy. I recommend the patient gain no more than 11-20 pounds throughout her entire pregnancy, increase her exercise and follow healthy dietary habits.  Consider referral to a dietitian should the patient have difficulty following the aforementioned recommendations.    Additional  surveillance is recommended starting at 36 weeks in those women with a BMI of greater than 40 given the increased risk for stillbirth.    We discussed follow-up in detail and I recommend an early anatomy ultrasound be scheduled for 16-17 weeks gestation.  A detailed anatomic evaluation is planned at 21 weeks gestation and a echocardiogram is recommended between 22 and 24 weeks gestation.  Serial growth ultrasounds are planned.    Thank you very much for allowing us to participate in the care of this very nice patient. Should you have any questions, please do not hesitate to contact me.    Portions of the record may have been created with voice recognition software. Occasional wrong word or \"sound a like\" substitutions may have occurred due to the inherent limitations of voice recognition software. Read the chart carefully and recognize, using context, where substitutions have occurred.  "

## 2024-01-29 NOTE — PROGRESS NOTES
Verito presents today for a genetic screening ultrasound.  This is her seventh pregnancy.  She has a history of 2 full-term vaginal deliveries last of which was complicated by a possible shoulder dystocia.  She indicates that she stopped pushing and required some help with delivery without any fetal or  injury.  The patient recently underwent an early 1 hour Glucola given strong family history of diabetes and risk factors.  Her 1 hour Glucola was very high at 285 mg/dL indicating that the patient likely has pregestational diabetes.  A hemoglobin A1c was ordered.  She recently quit cigarette use.  Family history is significant for diabetes and addiction.  A review of systems is otherwise negative.    We discussed the options for genetic screening, including but not limited to first trimester screening, second trimester screening, combined first and second trimester screening, noninvasive prenatal screening (NIPS) for patients at high risk and diagnostic screening through the use of CVS and amniocentesis. We discussed the risks and benefits of each approach including the sensitivities and false positive rates as well as the difference between a screening test and a diagnostic test. At the conclusion of our discussion the patient elected noninvasive prenatal testing utilizing the [employer] Non-invasive prenatal screening (NIPS) test. The patient had this blood work drawn in the office and the results should be available approximately 7-10 days after her blood draw. Her results will be reported from [facility].    We discussed that pregnancies complicated by DM are associated with increased  and maternal morbidity. Fetal risks include structural birth defects, macrosomia, shoulder dystocia, birth injuries, hypoglycemia, hyperbilirubinemia, stillbirth, and potential long-term sequelae such as obesity and impaired intellectual achievement. Maternal risks include preeclampsia and hypertensive disorders of  pregnancy, and operative delivery. Aggressive treatment of DM with diet, exercise, and medications including insulin and sometimes metformin have been shown to decrease fetal and  morbidity and mortality. We have a diabetes in pregnancy program where we follow our patients closely to help achieve optimal maternal and  outcomes. Recommend the patient be referred to this program for management.  I have ordered a hemoglobin A1c.    Given the patient's history of maternal age, diabetes, and BMI, I recommend initiating low dose aspirin therapy.  A recent meta-analysis yielded risk reductions of 24% for preeclampsia, 20% for intrauterine growth restriction, and 14% for  birth, with an absolute risk reduction of 2-5% for preeclampsia, one to 5% for intrauterine growth restriction, and 2-4% for  birth.  In this study, there was no identified risk of harm to the mother or fetus but long-term evidence was somewhat limited.  Given the overall safety profile and risk-benefit analysis, I recommend 162 mg of aspirin be taken Daily and discontinued at around 36 weeks gestation or 2-3 weeks prior to planned delivery.   I reviewed these recommendations with the patient and answered all of her questions to apparent satisfaction.    The implications of obesity and pregnancy are significant.  The level of obesity is directly related to the risk of adverse pregnancy outcomes including but not limited to, risk of diabetes, hypertensive disorders of pregnancy, macrosomia, intrauterine growth restriction, labor and shoulder dystocias,  section, and increased risk of stillbirth.  Recommend discussing the current weight gain recommendations for women with obesity and discussing good dietary practices as well as the safety of exercise in pregnancy. I recommend the patient gain no more than 11-20 pounds throughout her entire pregnancy, increase her exercise and follow healthy dietary habits.  Consider  "referral to a dietitian should the patient have difficulty following the aforementioned recommendations.    Additional  surveillance is recommended starting at 36 weeks in those women with a BMI of greater than 40 given the increased risk for stillbirth.    We discussed follow-up in detail and I recommend an early anatomy ultrasound be scheduled for 16-17 weeks gestation.  A detailed anatomic evaluation is planned at 21 weeks gestation and a echocardiogram is recommended between 22 and 24 weeks gestation.  Serial growth ultrasounds are planned.    Thank you very much for allowing us to participate in the care of this very nice patient. Should you have any questions, please do not hesitate to contact me.    Portions of the record may have been created with voice recognition software. Occasional wrong word or \"sound a like\" substitutions may have occurred due to the inherent limitations of voice recognition software. Read the chart carefully and recognize, using context, where substitutions have occurred.  "

## 2024-01-29 NOTE — PROGRESS NOTES
"Patient chose to have [facility] Non-invasive Prenatal Screen with fetal sex.   Patient choose billed thru insurance. .   Patient assistance program (PAP) application provided to patient no.  PAP sent with specimen No.     Patient given brochure and is aware [facility] will contact their insurance and coordinate coverage.  Patient made aware she will receive a text message and e-mail from [facility].   Patient informed text/phone call message will come from area code  \"415\".  Provided [facility] Client Services # [phone] and web site at [email or web address].   \"[place] your test online\" card with barcode and test tube ID provided to patient.   Reviewed [facility]'s web site states 5-7 business days for results via their portal.   Blink message will be sent to patient when [facility] receives results /provider reviews.    2 vials of blood drawn from  right arm by Lee Ann Aquino MA.   Patient tolerated blood draw without difficulty.  Specimens labeled with patient identifiers (name, date of birth, specimen collection date), order and specimen were verified with patient, packed and sent via [facility] lab .  FED EX  tracking #  [id number]  Copy of lab order scanned to Epic media.    [facility] will have results in approximately 7-10 business days and will call patient or notify via [employer].  Patient aware viewing lab result online will reveal fetal sex if ordered.    Patient verbalized understanding of all instructions and no questions at this time.   "

## 2024-01-31 ENCOUNTER — TELEMEDICINE (OUTPATIENT)
Facility: HOSPITAL | Age: 39
End: 2024-01-31
Payer: COMMERCIAL

## 2024-01-31 VITALS — BODY MASS INDEX: 38.24 KG/M2 | WEIGHT: 224 LBS | HEIGHT: 64 IN

## 2024-01-31 DIAGNOSIS — O99.810 ABNORMAL GLUCOSE AFFECTING PREGNANCY: ICD-10-CM

## 2024-01-31 DIAGNOSIS — O24.419 GESTATIONAL DIABETES MELLITUS (GDM) IN FIRST TRIMESTER, GESTATIONAL DIABETES METHOD OF CONTROL UNSPECIFIED: Primary | ICD-10-CM

## 2024-01-31 DIAGNOSIS — Z3A.13 13 WEEKS GESTATION OF PREGNANCY: ICD-10-CM

## 2024-01-31 DIAGNOSIS — O99.210 SEVERE OBESITY DUE TO EXCESS CALORIES AFFECTING PREGNANCY, ANTEPARTUM (HCC): ICD-10-CM

## 2024-01-31 DIAGNOSIS — E66.01 SEVERE OBESITY DUE TO EXCESS CALORIES AFFECTING PREGNANCY, ANTEPARTUM (HCC): ICD-10-CM

## 2024-01-31 PROCEDURE — 99215 OFFICE O/P EST HI 40 MIN: CPT | Performed by: NURSE PRACTITIONER

## 2024-01-31 PROCEDURE — 99417 PROLNG OP E/M EACH 15 MIN: CPT | Performed by: NURSE PRACTITIONER

## 2024-01-31 RX ORDER — BLOOD-GLUCOSE METER
EACH MISCELLANEOUS
Qty: 1 KIT | Refills: 0 | Status: SHIPPED | OUTPATIENT
Start: 2024-01-31

## 2024-01-31 RX ORDER — LANCETS
EACH MISCELLANEOUS
Qty: 100 EACH | Refills: 7 | Status: SHIPPED | OUTPATIENT
Start: 2024-01-31

## 2024-01-31 RX ORDER — PERPHENAZINE 16 MG/1
TABLET, FILM COATED ORAL
Qty: 100 EACH | Refills: 7 | Status: SHIPPED | OUTPATIENT
Start: 2024-01-31

## 2024-01-31 NOTE — PROGRESS NOTES
Virtual Regular Visit    Verification of patient location:    Patient is located at Other in the following state in which I hold an active license [place]      Assessment/Plan:    Problem List Items Addressed This Visit       13 weeks gestation of pregnancy    Relevant Medications    Blood Glucose Monitoring Suppl (Contour Next EZ) w/Device KIT    Contour Next Test test strip    Microlet Lancets MISC    Other Relevant Orders    Mychart glucose flowsheet    Obesity complicating pregnancy, childbirth, or puerperium, antepartum     -Pre-pregnancy weight 215 lbs.  -Current weight 224 lbs.  -Recommended weight gain 11 to 20 lbs.  -Start GDM meal plan and follow up with dietitian.          Relevant Medications    Blood Glucose Monitoring Suppl (Contour Next EZ) w/Device KIT    Contour Next Test test strip    Microlet Lancets MISC    Other Relevant Orders    GHEN MATERIAL[employer] glucose flowsheet    Gestational diabetes mellitus (GDM) in first trimester - Primary     -Check A1c. Last CMP within normal.   -A1c goal is 5.6% or less.  -Follow up with dietitian.  -Keep 3 day food log to review with dietitian.  -Start self monitoring blood glucose (SMBG) fasting and 2 hours after start of each meal.   -Glucose goals: fasting 60-90 mg/dL, 140 mg/dL or less 1 hour post meals, and 120 mg/dL or less 2 hours post meal.   -Report glucose readings weekly via [facility] every Wednesday.   -Start GDM 2000 calorie meal plan with 3 meals and 3 snacks including recommended combination of carb, protein and fat per meal/snack.  -Please eat meal or snack every 2-3.5 hours while awake.  -No more than 8 to 10 hours of fasting overnight.  -Refer to [employer] MyPlate online.   -Minimum total daily carbohydrates 175 grams paired with half grams in protein.   -Stay active if no restriction from your OB, walk up to 30 minutes a day.  -Always have glucose available to treat hypoglycemia. Use 15:15 rule. Refer to hypoglycemia patient education sheet. SMBG when  "experiencing signs and symptoms of hypoglycemia and prior to driving.   -Fetal growth ultrasound every 4 to 6 weeks gestation.  -20 week detailed fetal growth ultrasound.  -22-24 weeks fetal echo.  -If diabetes related medications are started; st 32 weeks gestation, NST twice a week and SAKINA weekly.   -Continue prenatal vitamin and baby aspirin as recommended.  -At 36 weeks gestation, discontinue baby aspirin.   -Continue follow-up with your OB and MFM as recommended.  -Stay in close contact with diabetes education team.  -Insulin requirements during pregnancy; basal/bolus concept and Metformin discussed.  -Very important to maintain tight glucose control during pregnancy to decrease risk factors including fetal macrosomia; birth injury; risk of ; polyhydramnios; pre-term labor; pre-eclampsia;  hypoglycemia; jaundice and stillbirth.   -Diabetes and pregnancy booklet; meal plan and hypoglycemia patient education.        No results found for: \"HGBA1C\"         Relevant Medications    Blood Glucose Monitoring Suppl (Contour Next EZ) w/Device KIT    Contour Next Test test strip    Microlet Lancets MISC    Other Relevant Orders    Mychart glucose flowsheet    BMI 38.0-38.9,adult    Relevant Medications    Blood Glucose Monitoring Suppl (Contour Next EZ) w/Device KIT    Contour Next Test test strip    Microlet Lancets MISC    Other Relevant Orders    Mychart glucose flowsheet    Abnormal glucose affecting pregnancy    Relevant Medications    Blood Glucose Monitoring Suppl (Contour Next EZ) w/Device KIT    Contour Next Test test strip    Microlet Lancets MISC    Other Relevant Orders    Mychart glucose flowsheet            Reason for visit is   Chief Complaint   Patient presents with    Gestational Diabetes    Patient Education    Virtual Regular Visit          Encounter provider ELIER Gray    Provider located at [facility]  [address]  [facility] [id number]      Recent Visits  No visits were found meeting these conditions.  Showing recent visits within past 7 days and meeting all other requirements  Today's Visits  Date Type Provider Dept   24 Telemedicine ELIER Gray An    Showing today's visits and meeting all other requirements  Future Appointments  No visits were found meeting these conditions.  Showing future appointments within next 150 days and meeting all other requirements       The patient was identified by name and date of birth. Verito Mata was informed that this is a telemedicine visit and that the visit is being conducted through the Epic Embedded platform. She agrees to proceed..  My office door was closed. No one else was in the room.  She acknowledged consent and understanding of privacy and security of the video platform. The patient has agreed to participate and understands they can discontinue the visit at any time.    Patient is aware this is a billable service.     Subjective  Verito Mata is a 38 y.o. female  13 3/7 weeks gestation GDM. No history of GDM. 1 hour .      Verito Mata is a 38 y.o. female  13 3/7 weeks gestation GDM. No history of GDM. Family history of diabetes.   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 Does not exercise currently. Works at [employer].         Past Medical History:   Diagnosis Date    Anxiety     Depression     Ectopic pregnancy 2015    Miscarriage     , 2020 x 2       Past Surgical History:   Procedure Laterality Date    APPENDECTOMY  2014    DIAGNOSTIC LAPAROSCOPY Left 2015    ectopic- left (tube was cut, not removed per pt)    MULTIPLE TOOTH EXTRACTIONS      all teeth removed       Current Outpatient  "Medications   Medication Sig Dispense Refill    Blood Glucose Monitoring Suppl (Contour Next EZ) w/Device KIT Dispense 1 kit per insurance formulary. GDM. 1 kit 0    Contour Next Test test strip Test 4 times a day.  each 7    Microlet Lancets MISC Use 4 a day.  each 7    aspirin 81 mg chewable tablet Chew 2 tablets (162 mg total) daily 180 tablet 1    ondansetron (ZOFRAN) 4 mg tablet Take 1 tablet (4 mg total) by mouth every 8 (eight) hours as needed for nausea or vomiting 20 tablet 0    Prenatal Vit w/Xy-Bfirunevm-LW (PNV PO) Take by mouth       No current facility-administered medications for this visit.        No Known Allergies    Review of Systems   Constitutional:  Positive for fatigue. Negative for fever.   HENT:  Negative for congestion and trouble swallowing.    Eyes:  Negative for visual disturbance.   Respiratory:  Negative for cough and shortness of breath.    Cardiovascular:  Positive for palpitations (intermittently after meals). Negative for chest pain.   Gastrointestinal:  Positive for constipation, diarrhea (recent last 2 days, no fever, prior to this episode had constipation) and nausea. Negative for vomiting.   Endocrine: Positive for polydipsia and polyuria. Negative for polyphagia.   Genitourinary:  Negative for difficulty urinating and vaginal bleeding.   Musculoskeletal:  Negative for back pain.   Skin:  Negative for rash.   Neurological:  Positive for numbness (hands) and headaches.        History of headaches   Psychiatric/Behavioral:  Negative for sleep disturbance.        Video Exam    Vitals:    01/31/24 0812   Weight: 102 kg (224 lb)   Height: 5' 4\" (1.626 m)       Physical Exam  HENT:      Head: Normocephalic.      Nose: Nose normal.   Eyes:      Conjunctiva/sclera: Conjunctivae normal.   Pulmonary:      Effort: Pulmonary effort is normal.   Neurological:      Mental Status: She is alert and oriented to person, place, and time.   Psychiatric:         Mood and Affect: Mood " normal.         Behavior: Behavior normal.         Thought Content: Thought content normal.         Judgment: Judgment normal.          Visit Time  Total Visit Duration: 70 minutes.

## 2024-01-31 NOTE — ASSESSMENT & PLAN NOTE
-Check A1c. Last CMP within normal.   -A1c goal is 5.6% or less.  -Follow up with dietitian.  -Keep 3 day food log to review with dietitian.  -Start self monitoring blood glucose (SMBG) fasting and 2 hours after start of each meal.   -Glucose goals: fasting 60-90 mg/dL, 140 mg/dL or less 1 hour post meals, and 120 mg/dL or less 2 hours post meal.   -Report glucose readings weekly via [employer] every Wednesday.   -Start GDM 2000 calorie meal plan with 3 meals and 3 snacks including recommended combination of carb, protein and fat per meal/snack.  -Please eat meal or snack every 2-3.5 hours while awake.  -No more than 8 to 10 hours of fasting overnight.  -Refer to [employer] MyPlate online.   -Minimum total daily carbohydrates 175 grams paired with half grams in protein.   -Stay active if no restriction from your OB, walk up to 30 minutes a day.  -Always have glucose available to treat hypoglycemia. Use 15:15 rule. Refer to hypoglycemia patient education sheet. SMBG when experiencing signs and symptoms of hypoglycemia and prior to driving.   -Fetal growth ultrasound every 4 to 6 weeks gestation.  -20 week detailed fetal growth ultrasound.  -22-24 weeks fetal echo.  -If diabetes related medications are started; st 32 weeks gestation, NST twice a week and SAKINA weekly.   -Continue prenatal vitamin and baby aspirin as recommended.  -At 36 weeks gestation, discontinue baby aspirin.   -Continue follow-up with your OB and MFM as recommended.  -Stay in close contact with diabetes education team.  -Insulin requirements during pregnancy; basal/bolus concept and Metformin discussed.  -Very important to maintain tight glucose control during pregnancy to decrease risk factors including fetal macrosomia; birth injury; risk of ; polyhydramnios; pre-term labor; pre-eclampsia;  hypoglycemia; jaundice and stillbirth.   -Diabetes and pregnancy booklet; meal plan and hypoglycemia patient education.        No results  "found for: \"HGBA1C\"  "

## 2024-01-31 NOTE — ASSESSMENT & PLAN NOTE
-Pre-pregnancy weight 215 lbs.  -Current weight 224 lbs.  -Recommended weight gain 11 to 20 lbs.  -Start GDM meal plan and follow up with dietitian.

## 2024-02-04 ENCOUNTER — HOSPITAL ENCOUNTER (EMERGENCY)
Facility: HOSPITAL | Age: 39
Discharge: HOME | End: 2024-02-04
Attending: EMERGENCY MEDICINE | Admitting: EMERGENCY MEDICINE
Payer: COMMERCIAL

## 2024-02-04 VITALS
HEART RATE: 97 BPM | WEIGHT: 224.65 LBS | SYSTOLIC BLOOD PRESSURE: 128 MMHG | DIASTOLIC BLOOD PRESSURE: 72 MMHG | BODY MASS INDEX: 38.56 KG/M2 | TEMPERATURE: 97.4 F | RESPIRATION RATE: 17 BRPM | OXYGEN SATURATION: 100 %

## 2024-02-04 DIAGNOSIS — Z3A.14 14 WEEKS GESTATION OF PREGNANCY: ICD-10-CM

## 2024-02-04 DIAGNOSIS — N93.9 VAGINAL BLEEDING: Primary | ICD-10-CM

## 2024-02-04 LAB — B-HCG SERPL-ACNC: ABNORMAL MIU/ML (ref 0–5)

## 2024-02-04 PROCEDURE — 99284 EMERGENCY DEPT VISIT MOD MDM: CPT | Performed by: EMERGENCY MEDICINE

## 2024-02-04 PROCEDURE — 99284 EMERGENCY DEPT VISIT MOD MDM: CPT

## 2024-02-04 PROCEDURE — 84702 CHORIONIC GONADOTROPIN TEST: CPT

## 2024-02-04 PROCEDURE — 36415 COLL VENOUS BLD VENIPUNCTURE: CPT

## 2024-02-04 NOTE — ED PROVIDER NOTES
History  Chief Complaint   Patient presents with    Vaginal Bleeding     Pt noticed some spotting this morning. 14 weeks pregnant, denies abdominal pain.      Verito is a  38 year old female 14 weeks gestation with a PMHx ectopic pregnancies, miscarriages presenting to the ED for vaginal bleeding x this morning. Reports waking up this morning and noted some vaginal spotting, but was not a lot. Has been subsiding since. There is no associated abdominal pain or cramping. Denies trauma or sexual intercourse recently. Did experience vaginal spotting with previous pregnancy but was earlier in the first trimester. Had this about 1.5 months ago and seen in the ED. Has been following with [facility] and has a confirmed intrauterine pregnancy.      Prior to Admission Medications   Prescriptions Last Dose Informant Patient Reported? Taking?   Blood Glucose Monitoring Suppl (Contour Next EZ) w/Device KIT   No No   Sig: Dispense 1 kit per insurance formulary. GDM.   Contour Next Test test strip   No No   Sig: Test 4 times a day. GDM   Microlet Lancets MISC   No No   Sig: Use 4 a day. GDM   Prenatal Vit w/Kk-Ocrawugnr-OO (PNV PO)   Yes No   Sig: Take by mouth   aspirin 81 mg chewable tablet   No No   Sig: Chew 2 tablets (162 mg total) daily   ondansetron (ZOFRAN) 4 mg tablet   No No   Sig: Take 1 tablet (4 mg total) by mouth every 8 (eight) hours as needed for nausea or vomiting      Facility-Administered Medications: None       Past Medical History:   Diagnosis Date    Anxiety     Depression     Ectopic pregnancy 2015    Miscarriage     , 2020 x 2       Past Surgical History:   Procedure Laterality Date    APPENDECTOMY  2014    DIAGNOSTIC LAPAROSCOPY Left     ectopic- left (tube was cut, not removed per pt)    MULTIPLE TOOTH EXTRACTIONS      all teeth removed       Family History   Problem Relation Age of Onset    Asthma Mother     Diabetes Mother     COPD Mother     Hepatitis Mother     Asthma Brother      Drug abuse Brother     No Known Problems Daughter     No Known Problems Son     Diabetes Maternal Grandmother     Diabetes Maternal Grandfather      I have reviewed and agree with the history as documented.    E-Cigarette/Vaping    E-Cigarette Use Never User      E-Cigarette/Vaping Substances     Social History     Tobacco Use    Smoking status: Former     Current packs/day: 0.00     Average packs/day: 0.5 packs/day for 20.0 years (10.0 ttl pk-yrs)     Types: Cigarettes     Quit date: 2023     Years since quittin.1    Smokeless tobacco: Never   Vaping Use    Vaping status: Never Used   Substance Use Topics    Alcohol use: Not Currently     Comment: occasionally- not since confirmed pregnancy    Drug use: Not Currently     Types: Marijuana     Comment: medical- quit 23 with confirmed pregnancy     Review of Systems   Constitutional:  Negative for chills and fever.   Eyes:  Negative for photophobia and visual disturbance.   Respiratory:  Negative for cough and shortness of breath.    Cardiovascular:  Negative for chest pain and palpitations.   Gastrointestinal:  Negative for abdominal pain, nausea and vomiting.   Genitourinary:  Positive for vaginal bleeding. Negative for decreased urine volume, dysuria, frequency, pelvic pain, urgency, vaginal discharge and vaginal pain.   Skin:  Negative for color change and rash.   Neurological:  Negative for light-headedness and headaches.     Physical Exam  Physical Exam  Vitals reviewed. Exam conducted with a chaperone present (ED technician.).   Constitutional:       General: She is not in acute distress.     Appearance: Normal appearance. She is not ill-appearing, toxic-appearing or diaphoretic.   HENT:      Head: Normocephalic and atraumatic.      Right Ear: External ear normal.      Left Ear: External ear normal.      Nose: Nose normal.   Eyes:      General: No scleral icterus.        Right eye: No discharge.         Left eye: No discharge.       Extraocular Movements: Extraocular movements intact.      Conjunctiva/sclera: Conjunctivae normal.      Pupils: Pupils are equal, round, and reactive to light.   Cardiovascular:      Rate and Rhythm: Normal rate and regular rhythm.      Pulses: Normal pulses.      Heart sounds: Normal heart sounds.   Pulmonary:      Effort: Pulmonary effort is normal. No respiratory distress.      Breath sounds: Normal breath sounds.   Abdominal:      Palpations: Abdomen is soft.      Tenderness: There is no abdominal tenderness. There is no right CVA tenderness, left CVA tenderness or guarding.   Genitourinary:     Exam position: Lithotomy position.      Labia:         Right: No rash or tenderness.         Left: No rash or tenderness.       Vagina: Normal. No signs of injury and foreign body. No vaginal discharge, erythema, tenderness or bleeding.      Cervix: Normal.      Comments: Cervical os is closed without active bleeding. There is no remaining blood or tissue in the vaginal vault.  Musculoskeletal:         General: Normal range of motion.      Cervical back: Normal range of motion and neck supple. No rigidity or tenderness.   Lymphadenopathy:      Cervical: No cervical adenopathy.   Skin:     General: Skin is warm and dry.      Capillary Refill: Capillary refill takes less than 2 seconds.   Neurological:      General: No focal deficit present.      Mental Status: She is alert.   Psychiatric:         Mood and Affect: Mood normal.         Behavior: Behavior normal.         Vital Signs  ED Triage Vitals [02/04/24 1115]   Temperature Pulse Respirations Blood Pressure SpO2   (!) 97.4 °F (36.3 °C) 97 17 128/72 100 %      Temp Source Heart Rate Source Patient Position - Orthostatic VS BP Location FiO2 (%)   Oral Monitor -- Right arm --      Pain Score       No Pain           Vitals:    02/04/24 1115   BP: 128/72   Pulse: 97         Visual Acuity      ED Medications  Medications - No data to display    Diagnostic Studies  Results  Reviewed       Procedure Component Value Units Date/Time    hCG, quantitative [[id number]]  (Abnormal) Collected: 02/04/24 1138    Lab Status: Final result Specimen: Blood from Arm, Right Updated: 02/04/24 1245     HCG, Quant 61,194 mIU/mL     Narrative:       Expected Ranges:    HCG results between 5 and 25 mIU/mL may be indicative of early pregnancy but should be interpreted in light of the total clinical presentation.    HCG can rise to detectable levels in malick and post menopausal women (0-11.6 mIU/mL).     Approximate               Approximate HCG  Gestation age          Concentration ( mIU/mL)  _____________          ______________________   Weeks                      HCG values  0.2-1                       5-50  1-2                           2-3                         100-5000  3-4                         500-89890  4-5                         1000-20494  5-6                         [id number]  6-8                         [id number]  8-12                        [id number]                     No orders to display              Procedures  Procedures         ED Course                                             Medical Decision Making  38 year old female presenting to the ED for vaginal bleeding without abdominal pain or cramping at 14 weeks gestation.     Physical exam is with no active vaginal bleeding, closed cervical os, and no residual blood or tissue in the vaginal vault. No abdominal or pelvic tenderness. Fetal heart tones were detected towards the left pelvic region, noted to be in the 150s. bHcg pending.    Plan was to originally order an ultrasound, was notified by ultrasound that because she is 14 weeks, I would need to contact ObGyn instead.     Discussed with Dr. Desai, ObGyn provider on call, and based on physical exam she is stable for discharge home with ObGyn follow up this week. She follows with [facility] and will notify them. Patient comfortable with this plan and is  agreeable.    Pt stable at time of discharge, vital signs reviewed, questions answered. Strict ER return precautions provided/discussed and were well understood by patient.    Problems Addressed:  14 weeks gestation of pregnancy: acute illness or injury  Vaginal bleeding: acute illness or injury    Amount and/or Complexity of Data Reviewed  External Data Reviewed: labs, radiology and notes.     Details: Reviewed ED visit in December 2023 from vaginal bleeding episode.  Labs: ordered.  Discussion of management or test interpretation with external provider(s): Discussed case with Dr. Desai, ObGyn provider on call, who confirmed outpatient follow up would be acceptable based on physical exam and workup completed in the ED.             Disposition  Final diagnoses:   Vaginal bleeding   14 weeks gestation of pregnancy     Time reflects when diagnosis was documented in both MDM as applicable and the Disposition within this note       Time User Action Codes Description Comment    2/4/2024 12:20 PM Kanchan Espinosa [N93.9] Vaginal bleeding     2/4/2024 12:20 PM Kanchan Espinosa [Z3A.14] 14 weeks gestation of pregnancy           ED Disposition       ED Disposition   Discharge    Condition   Stable    Date/Time   Sun Feb 4, 2024 12:20 PM    Comment   Verito Mata discharge to home/self care.                   Follow-up Information       Follow up With Specialties Details Why Contact Info Additional Information    [facility] Emergency Department Emergency Medicine Go to  If symptoms worsen [address] [facility] [id number] [phone] [facility] Emergency Department, [address] [place], [place], [address]            Discharge Medication List as of 2/4/2024 12:22 PM        CONTINUE these medications which have NOT CHANGED    Details   aspirin 81 mg chewable tablet Chew 2 tablets (162 mg total) daily, Starting Mon 1/29/2024, Normal      Blood  Glucose Monitoring Suppl (Contour Next EZ) w/Device KIT Dispense 1 kit per insurance formulary. GDM., Normal      Contour Next Test test strip Test 4 times a day. GDM, Normal      Microlet Lancets MISC Use 4 a day. GDM, Normal      ondansetron (ZOFRAN) 4 mg tablet Take 1 tablet (4 mg total) by mouth every 8 (eight) hours as needed for nausea or vomiting, Starting Tue 11/2/2021, Normal      Prenatal Vit w/Ri-Fgcucqtpe-ME (PNV PO) Take by mouth, Historical Med             No discharge procedures on file.    PDMP Review       None            ED Provider  Electronically Signed by             Kanchan Espinosa PA-C  02/04/24 6352

## 2024-02-04 NOTE — DISCHARGE INSTRUCTIONS
On exam today there was no active bleeding from your cervix, and your cervix was closed. There was no remaining blood in your vaginal vault or passage of tissue. Fetal heart tones were 150s-160s which is where we expect them to be at this gestational age.     Follow up with your ObGyn provider in the morning to notify them of today's visit and to determine further evaluation.     Please return to the ED if you experience worsening bleeding, passage of clots of tissue, abdominal cramping or pain.

## 2024-02-05 ENCOUNTER — TELEPHONE (OUTPATIENT)
Dept: OBGYN CLINIC | Facility: CLINIC | Age: 39
End: 2024-02-05

## 2024-02-05 ENCOUNTER — ROUTINE PRENATAL (OUTPATIENT)
Dept: OBGYN CLINIC | Facility: MEDICAL CENTER | Age: 39
End: 2024-02-05

## 2024-02-05 ENCOUNTER — LAB (OUTPATIENT)
Dept: LAB | Facility: MEDICAL CENTER | Age: 39
End: 2024-02-05
Payer: COMMERCIAL

## 2024-02-05 VITALS
WEIGHT: 222 LBS | SYSTOLIC BLOOD PRESSURE: 116 MMHG | DIASTOLIC BLOOD PRESSURE: 70 MMHG | BODY MASS INDEX: 37.9 KG/M2 | HEIGHT: 64 IN

## 2024-02-05 DIAGNOSIS — Z34.81 PRENATAL CARE, SUBSEQUENT PREGNANCY, FIRST TRIMESTER: ICD-10-CM

## 2024-02-05 DIAGNOSIS — O26.859 SPOTTING IN PREGNANCY: Primary | ICD-10-CM

## 2024-02-05 DIAGNOSIS — O24.911 DIABETES MELLITUS AFFECTING PREGNANCY, FIRST TRIMESTER: ICD-10-CM

## 2024-02-05 LAB
EST. AVERAGE GLUCOSE BLD GHB EST-MCNC: 154 MG/DL
HBA1C MFR BLD: 7 %

## 2024-02-05 PROCEDURE — PNV: Performed by: CLINICAL NURSE SPECIALIST

## 2024-02-05 PROCEDURE — 36415 COLL VENOUS BLD VENIPUNCTURE: CPT

## 2024-02-05 PROCEDURE — 83036 HEMOGLOBIN GLYCOSYLATED A1C: CPT

## 2024-02-05 NOTE — ASSESSMENT & PLAN NOTE
Pt with a few episodes of spotting throughout first trimester  Again yesterday and seen in the ED and is here for follow up.   Bldg is less- today reports only pink with wiping.  RH Pos -rhogam not indicated.  Exam wnl- US done for good - good interval growth- one CRL measurement done and c/w stated GA 14w1d. No clear JULIA seen, though possible- there was a slight hypoechoic area- measuing 1.73 x 0.76- but very irregular and may be a normal variation..   Reviewed her pregnancy is continuing to thrive despite the small amt of bldg she has had. Encouraged to call back for reassessment if bldg becomes heavy. But also counseled she may continue to spot on and off for several weeks.

## 2024-02-05 NOTE — PROGRESS NOTES
Prenatal Visit  Subjective:   Verito is a 38 y.o.  14w1d here for Routine Prenatal Visit (VIBHA 24/Blood type O+/Labs utd /Flu /Urine /Pt has +FM/Questions or concerns- /Patient went into ED with vaginal bleeding patient says she is still spotting /) and Follow-up    Pt here for ED follow-up.  She is a  who is 14w1d   Started with  vaginal bleeding that started yesterday morning.  Was a small amt and denied cramping. Was seen in the ED.for this. She had no bleeding on exam in the ED. They documented a FHR, but no imaging done.  She has has spotting previously in this pregnancy but no JULIA previously noted.  Rh pos     Pregnancy also complicated by GDM (diet controlled), and BMI 38.     Today she reports just scant pink with wiping and no cramping.    Objective:  Vitals:    24 1008   BP: 116/70     Pregravid Weight/BMI: 97.5 kg (215 lb) (BMI 36.89)  Current Weight: 101 kg (222 lb)   Total Weight Gain: 3.175 kg (7 lb)     OBGyn Exam  Physical Exam  Fetal Heart Rate: 161 ,      General: Not in acute distress and appearing well nourished and well groomed  Genitourinary:          Pelvic exam normal   Cardiovascular:   Rate and Rhythm: Normal rate.   Pulmonary:    Effort: normal, not labored  Abdominal:  Abdomen is soft and gravid   Musculoskeletal: Active movement of all extremities, no gross limitations of ROM     Edema: None  Neurological:   Mental Status: She is alert and oriented to person, place, and time.   Skin: General: Skin is warm and dry.   Psychiatric:    Mood and Affect: Mood normal.      Behavior: Behavior normal      Assessment & Plan:    1. Spotting in pregnancy  Assessment & Plan:  Pt with a few episodes of spotting throughout first trimester  Again yesterday and seen in the ED and is here for follow up.   Bldg is less- today reports only pink with wiping.  RH Pos -rhogam not indicated.  Exam wnl- US done for good - good interval growth- one CRL measurement done and c/w  stated GA 14w1d. No clear JULIA seen, though possible- there was a slight hypoechoic area- measuing 1.73 x 0.76- but very irregular and may be a normal variation..   Reviewed her pregnancy is continuing to thrive despite the small amt of bldg she has had. Encouraged to call back for reassessment if bldg becomes heavy. But also counseled she may continue to spot on and off for several weeks.        2. Prenatal care, subsequent pregnancy, first trimester        ELIER Mart  2/5/2024

## 2024-02-05 NOTE — TELEPHONE ENCOUNTER
Patient is calling because she was in the ER at [facility] yesterday 2/4/24 for vaginal spotting.  She is currently 14 weeks pregnant and has a history of miscarriage and entopic pregnancy.  She said she is worried as they were having issues with the fetal heart monitor and patient is not feeling comfortable with the ability of the ER.  Looking for a call back to follow up

## 2024-02-05 NOTE — TELEPHONE ENCOUNTER
Pt will be given an appt to come in this week. Pt has no pain, still dark spotting. Ketty gives her an appt

## 2024-02-06 ENCOUNTER — TELEPHONE (OUTPATIENT)
Facility: HOSPITAL | Age: 39
End: 2024-02-06

## 2024-02-06 NOTE — TELEPHONE ENCOUNTER
----- Message from Ton Mandel MD sent at 2/6/2024 12:48 PM EST -----  I have reviewed the results of the NIPS which are low risk.  Please call patient and notify her of reassuring results if she has not viewed on MyChart. Please ensure she is notified of recommendation of MSAFP to be ordered and followed up through her primary Obstetrician's office.  Thank you.

## 2024-02-06 NOTE — RESULT ENCOUNTER NOTE
I have reviewed the labs which indicates the patient has  pregestational diabetes. Please contact the patient and notify her of the results if she has not reviewed them in MyChart.   Thank you

## 2024-02-06 NOTE — TELEPHONE ENCOUNTER
Spoke with patient and provided her with the results of the NIPS. Gender NOT provided. Informed pt on MSAFP being ordered by OB and timing of the test. Pt receptive to information and had no further questions. Message routed to OB for ordering MSAFP.

## 2024-02-07 ENCOUNTER — TELEMEDICINE (OUTPATIENT)
Dept: PERINATAL CARE | Facility: CLINIC | Age: 39
End: 2024-02-07
Payer: COMMERCIAL

## 2024-02-07 ENCOUNTER — TELEPHONE (OUTPATIENT)
Dept: PERINATAL CARE | Facility: CLINIC | Age: 39
End: 2024-02-07

## 2024-02-07 ENCOUNTER — HOSPITAL ENCOUNTER (EMERGENCY)
Facility: HOSPITAL | Age: 39
Discharge: HOME | End: 2024-02-08
Attending: EMERGENCY MEDICINE
Payer: COMMERCIAL

## 2024-02-07 VITALS
HEART RATE: 97 BPM | SYSTOLIC BLOOD PRESSURE: 134 MMHG | TEMPERATURE: 98 F | RESPIRATION RATE: 19 BRPM | DIASTOLIC BLOOD PRESSURE: 65 MMHG | OXYGEN SATURATION: 99 %

## 2024-02-07 DIAGNOSIS — O24.419 GESTATIONAL DIABETES MELLITUS (GDM) IN SECOND TRIMESTER, GESTATIONAL DIABETES METHOD OF CONTROL UNSPECIFIED: Primary | ICD-10-CM

## 2024-02-07 DIAGNOSIS — O24.419 GESTATIONAL DIABETES: Primary | ICD-10-CM

## 2024-02-07 DIAGNOSIS — O99.810 ABNORMAL GLUCOSE AFFECTING PREGNANCY: ICD-10-CM

## 2024-02-07 DIAGNOSIS — Z3A.14 14 WEEKS GESTATION OF PREGNANCY: ICD-10-CM

## 2024-02-07 LAB — GLUCOSE SERPL-MCNC: 116 MG/DL (ref 65–140)

## 2024-02-07 PROCEDURE — 82948 REAGENT STRIP/BLOOD GLUCOSE: CPT

## 2024-02-07 PROCEDURE — 99283 EMERGENCY DEPT VISIT LOW MDM: CPT

## 2024-02-07 PROCEDURE — G0108 DIAB MANAGE TRN  PER INDIV: HCPCS

## 2024-02-07 RX ORDER — INSULIN GLARGINE 100 [IU]/ML
INJECTION, SOLUTION SUBCUTANEOUS
Qty: 15 ML | Refills: 0 | Status: ON HOLD | OUTPATIENT
Start: 2024-02-07 | End: 2024-02-13

## 2024-02-07 NOTE — PATIENT INSTRUCTIONS
INSULIN EDUCATION    Thank you for attending our insulin education class.     For a quick recap on what you learned today you can review the Bas[employer] educational video for insulin pens. https://www.[employer]/how-to-use-basaglar    Medications are being recommended to decrease the risk of side effects resulting from hyperglycemia in pregnancy including macrosomia,  hypoglycemia, polyhydramnios, pre-term labor and stillbirth.    Insulin Education:  You will receive 5 pens from the pharmacy  Each insulin pen has 300 units in one pen. Can deliver up to 80 units at one time.   When opening a new pen, remove one insulin pen 15 minutes up to 2 hours before administering to bring insulin to room temperature.   Once you open an insulin pen, it is only good for 28 days at room temperature.   Insulin can be titrated every 3-5 days as needed to control blood sugars.  Insulin doses vary as the pregnancy progresses depending on your weekly blood sugars.     Preparing your pen:  Remove pen cap.  Wipe rubber seal with alcohol wipe. Let dry.  Make sure insulin is clear and colorless. Check expiration date. (Do not use if it's cloudy, colored, or had particles or clumps in it.)  Select a new needle each time. Remove tab, connect and twist on top of insulin pen.   Remove outer and inner needle shields that help protect needle. Keep outer shield to recap your used needle once you are done.     Priming, dosing and injecting:  Perform a 2 unit test dose before each injection. Repeat priming steps until you see insulin at tip of needle. (Do not repeat more than 4 times. If after 4 times you do not see insulin at tip of needle, remove and insert a new needle)   Turn your dose knob to the prescribed units. (Do not count clicks, make sure the number and line matches your prescribed dose)  Choose your site. Remember to rotate injection sites and stay away from stretch marks or scars.   Wipe injection site clean with alcohol and  let dry completely.   Insert needle into your skin, press dose knob and inject slowly. Count to 10 before removing needle from skin.     Clean up:  Replace outer needle shield to cover the needle back up. Unscrew capped needle and throw it away in sharps container.  Make sure your sharp container is: heavy duty plastic, puncture-resistant lid, upright and stable, leak resistant, properly labeled.     Hypoglycemia:  Test your blood sugar at 3:00 am for first 3 mornings following insulin start to monitor for hypoglycemia.   Goal range for 2-3 am:  mg/dL.  Treat low blood sugars <60 or <80 using the 15-15 rule  Always have glucose available for hypoglycemia, use 15 by 15 rule. You can find the 15:15 rule in our GDM booklet.     Scheduling:  Non-stress testing two times weekly and SAKINA testing beginning at 32 weeks gestation. Call [phone] to schedule if not already scheduled.   Ultrasounds every 4 weeks at the [facility]etal [facility]. Call [phone] to schedule if not already scheduled.     Reporting Blood Sugars:  When adding your blood sugar readings to your glucose flow sheet please ADD UNITS of insulin. This will allow our team see what day you started and when you made any possible adjustments. Important for our team to know in regards to making any changes since it takes 3-5 days to see a difference. We would appreciate this and thank you for all you do to communicate with our team.     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  Meal Plan: 3 meals and 3 snacks daily.   Eating every 2 to 3.5 hours during the day.    Be sure to have bedtime snack that includes at least 15 grams of carbohydrates and 2 to 3 servings of protein.  Minimum of total carbohydrates of 175 grams daily.   Carbohydrates always need to be paired with protein.   Physical Activity: If ok by your OB try adding a 20-30 minute walk in evening after dinner to help keep fasting glucose within range.  Continue follow up with OB and MFM as recommended.  Stay in close contact with diabetes education team.   While sick or feeling ill, follow our sick day guidelines in our GDM booklet.   For travel and dining out tips refer to our GDM booklet.    If you have any questions or concerns, please do not hesitate to call us at [phone].      Lorene Mazariegos RD   Diabetes Educator  [facility]   at [facility] - Maternal Fetal Medicine

## 2024-02-07 NOTE — PROGRESS NOTES
"CLASS 2 - Individual  (virtual visit)    Thank you for referring your patient to [facility] Maternal Fetal Medicine Diabetes and Pregnancy Program.     Verito Mata is a  38 y.o. female who presents today unaccompanied for Virtual Regular Visit, Patient Education, and Gestational Diabetes. Patient is at 14w3d gestation, Estimated Date of Delivery: 8/4/24.     Visit Diagnosis:  Encounter Diagnosis     ICD-10-CM    1. Gestational diabetes mellitus (GDM) in second trimester, gestational diabetes method of control unspecified  O24.419       2. 14 weeks gestation of pregnancy  Z3A.14          Reviewed and updated the following from patients medical record: PMH, Problem List, Allergies, and Current Medications.    Labs  GDM LABS: See Class 1 Note    A1C:  Lab Results   Component Value Date/Time    HGBA1C 7.0 (H) 02/05/2024 10:35 AM      Labs Ordered This Visit: None    Current Medications:    Current Outpatient Medications:     aspirin 81 mg chewable tablet, Chew 2 tablets (162 mg total) daily, Disp: 180 tablet, Rfl: 1    Blood Glucose Monitoring Suppl (Contour Next EZ) w/Device KIT, Dispense 1 kit per insurance formulary. GDM., Disp: 1 kit, Rfl: 0    Contour Next Test test strip, Test 4 times a day. GDM, Disp: 100 each, Rfl: 7    Microlet Lancets MISC, Use 4 a day. GDM, Disp: 100 each, Rfl: 7    Prenatal Vit w/In-Iagnhajya-VV (PNV PO), Take by mouth, Disp: , Rfl:     ondansetron (ZOFRAN) 4 mg tablet, Take 1 tablet (4 mg total) by mouth every 8 (eight) hours as needed for nausea or vomiting (Patient not taking: Reported on 2/5/2024), Disp: 20 tablet, Rfl: 0     Anthropometrics:  Ht Readings from Last 1 Encounters:   02/05/24 5' 4\" (1.626 m)      Wt Readings from Last 3 Encounters:   02/05/24 101 kg (222 lb)   02/04/24 102 kg (224 lb 10.4 oz)   01/31/24 102 kg (224 lb)        Pre-Gravid Wt Pre-Gravid BMI TWG   97.5 kg (215 lb) 36.89 3.175 kg (7 lb)     Total Pregnancy Weight Gain Recommendations: BMI (> 30) 11-20 " lbs  Current Wt Status Compared to Recommendations: Exceeding Slightly    Most Recent Ultrasound Results:  Findings: NML Growth/SAKINA  Further Fetal Surveillance: None  Next US date: Scheduled Appropriately    BLOOD GLUCOSE MONITORING:   Glucometer: Contour Next EZ     Reinforced at Today's Visit:   Timing/Frequency of SMB x per day (Fasting, 2 hour after start of each meal)  Goals: (Fasting) 60-95mg/dL // (2hr PP) <120mg/dL  Reporting Guidelines: Weekly via Phone: [phone] OR My Chart (Message with image attachment) OR Glucose Flowsheet  Method of Reporting: [place] Glucose Flowsheet    BG LOG:         Review of Blood Glucose Log:   Indicates poor glycemic control  FBG = Not well controlled  Post-Prandial BG = Not well controlled    MEAL PLAN (Patient was provided with a meal plan including 3 meals and 3 snacks at class 1)  *Calories: 2000 calorie (CHO:45-15-60-15-60-30) (PRO: 2/3-1-3/4-1-3/-2)    Review of Patient's Current Diet: refer to class 1 note for additional details    Beverages: Sugar Sweetened Beverages Including Juice  Dining Out Frequency: Weekly - Pizza OR Chinese    Meal Plan Recommendations Compliant? Comments:    Consistent CHO Intake No     3 Meals and 3 Snacks No  - Skipping snacks; Skipping breakfast   Protein w/ Every Meal and Snack No     Eating every 2-3.5hrs while awake  No     8-10hrs Fasting (from time of bedtime snack until first meal of the day) Yes          Reinforced Diet Instructions:  Individualized meal plan.   Importance of consistent carbohydrate intake via 3 meals and 3 snacks per day   Importance of protein as it relates to blood glucose control.   Encouraged  patient to eat every 2.0-3.5 hours while awake  Encouraged patient to go no longer than 8-10 hours fasting overnight until first meal of the day.  Provided suggested meal/snack options to increase nutrition and maintain consistent meal and snack intakes.    Physical Activity:  Currently physically active?   No    Reviewed w/ Pt:   Benefits of physical activity to optimize blood glucose control, encouraged activity at patient is physically able.   Instructed pt to always consult a physician prior to starting an exercise program.   Recommend 20-30 minutes daily.    Additional Topics Reviewed:    Medications: (reviewed options available with pt)  Discussed if blood sugars are not within normal range with meal planning and exercise  Reviewed medication such as metformin and/or basal/bolus insulin may be needed for better glucose control  Maternal-Fetal Testing:   Ultrasounds: growth scans every 4 weeks.  NST: twice weekly starting at 32nd week GA  SAKINA:  weekly starting at 32 weeks GA  Sick day Guidelines:   Advised that sickness will raise blood sugar   If blood sugar is > 160 mg/dL twice in one day call doctor  If on diabetes medications, continue as instructed   If unable to consume normal meal plan, instructed to remain well hydrated   Hypoglycemia & Treatment Guidelines:  Reviewed what hypoglycemia is, signs and symptoms, and how to treat via the 15:15 rule.  Post-Partum Guidelines:  Completion of 75 gm CHO 2 hr gtt at 6 weeks post-partum to check for Type 2 DM diagnosis  Breastfeeding Guidelines:  Continue GDM meal plan plus additional 350-500 calories daily  Examples of protein and carbohydrate snacks provided.  Stay hydrated by drinking 8-10 (8 oz.) fluids daily.  Dining Out & Travel Guidelines:  Patient advised to be prepared with extra diabetes supplies, medications, and snacks, as well as sticking to the same time schedule and portions eaten at home for meals and snacks.    INSULIN EDUCATION - Start 30 units of Lantus once daily at bedtime (10-11pm) - Reviewed/Approved per Dr. Mandel and Dr. Angel. If unable to begin insulin tonight, recommend evaluation at L&D      The patient was instructed on the following:  Side effects of hyperglycemia in pregnancy including macrosomia,  hypoglycemia,  "polyhydramnios,  pre-term labor and stillbirth.  Insulin Administration Time: Once daily at bedtime (11pm)  Insulin Action: Approx. 24hours   How to Inject Insulin  Injection Sites     Monitoring:  Hypoglycemia signs, symptoms and treatment.   Advised patient to test blood sugar at 3:00 am for first 3 mornings following insulin start to monitor for hypoglycemia  Input in \"Night-time Glucose\" on Datumate Glucose Flowsheet  Increase in maternal-fetal surveillance with insulin initiation.  Non-stress testing twice weekly and SAKINA once weekly beginning at 32 weeks gestation.        Continue ultrasounds every 4 weeks at the [facility] F[facility]  Continue to test blood sugars 4 time daily:   Fasting (goal 60 mg/dl to 90 mg/dl)  2hrs After the START of each meal (goal less than 120 mg/dl).  Labs: HbA1c every 6 to 8 weeks    Reporting Blood Sugars:   Diabetes team will continue to review blood sugars once weekly till delivery  Pt instructed to message diabetes team via Datumate message every 3-5 days for insulin adjustment if fasting blood sugars remain >90    Patient Stated Goal: \"I will check my blood sugar 4 times each day, as directed by diabetes and pregnancy team\"  Goal Assessment: Not on track    Diabetes Self Management Support Plan outside of ongoing care: Spouse/Family    Barriers to Learning/Change: Financial  Expected Compliance: fair    Date to report blood sugars: Weekly   Follow up:  Return in 7 weeks (on 3/26/2024) for Visit w/ ELIER Sandy.     Begin Time: 10:00am  End Time: 11:00am    It was a pleasure working with them today. Please feel free to call ([phone]) with any questions or concerns.    Lorene Mazariegos RD   Diabetes Educator  [facility] Maternal Fetal Medicine  Diabetes and Pregnancy Program  [address], [address]  [place] [address]    Virtual Regular Visit    Verification of patient location:    Patient is located at Home in the following state in which I hold an active " license [place]      Assessment/Plan:    Problem List Items Addressed This Visit          Pregnancy    13 weeks gestation of pregnancy    Gestational diabetes mellitus (GDM) in first trimester - Primary            Reason for visit is   Chief Complaint   Patient presents with    Virtual Regular Visit    Patient Education    Gestational Diabetes          Encounter provider Lorene Mazariegos RD    Provider located at [address] [place] [address]  [phone]      Recent Visits  No visits were found meeting these conditions.  Showing recent visits within past 7 days and meeting all other requirements  Today's Visits  Date Type Provider Dept   24 Telephone Lorene Mazariegos RD [facility]   24 Telephone Lorene Mazariegos RD [facility]   24 Telemedicine Lorene Mazariegos RD [facility]   Showing today's visits and meeting all other requirements  Future Appointments  No visits were found meeting these conditions.  Showing future appointments within next 150 days and meeting all other requirements       The patient was identified by name and date of birth. Verito Mata was informed that this is a telemedicine visit and that the visit is being conducted through the Epic Embedded platform. She agrees to proceed..  My office door was closed. No one else was in the room.  She acknowledged consent and understanding of privacy and security of the video platform. The patient has agreed to participate and understands they can discontinue the visit at any time.    Patient is aware this is a billable service.     Subjective  Verito Mata is a 38 y.o. female pregnant.      HPI     Past Medical History:   Diagnosis Date    Anxiety     Depression     Ectopic pregnancy 2015    Miscarriage     2018, 2020 x 2       Past Surgical History:   Procedure Laterality Date    APPENDECTOMY  2014    DIAGNOSTIC LAPAROSCOPY Left 2015    ectopic-  left (tube was cut, not removed per pt)    MULTIPLE TOOTH EXTRACTIONS  2006    all teeth removed       Current Outpatient Medications   Medication Sig Dispense Refill    aspirin 81 mg chewable tablet Chew 2 tablets (162 mg total) daily 180 tablet 1    Blood Glucose Monitoring Suppl (Contour Next EZ) w/Device KIT Dispense 1 kit per insurance formulary. GDM. 1 kit 0    Contour Next Test test strip Test 4 times a day.  each 7    Microlet Lancets MISC Use 4 a day.  each 7    Prenatal Vit w/Wz-Zgusibfhm-CM (PNV PO) Take by mouth      ondansetron (ZOFRAN) 4 mg tablet Take 1 tablet (4 mg total) by mouth every 8 (eight) hours as needed for nausea or vomiting (Patient not taking: Reported on 2/5/2024) 20 tablet 0     No current facility-administered medications for this visit.        No Known Allergies    Review of Systems    Video Exam    There were no vitals filed for this visit.    Physical Exam     Visit Time  Total Visit Duration: 60min

## 2024-02-07 NOTE — TELEPHONE ENCOUNTER
Reason for Call: Late for Appointment (Class 2 of The Diabetes and Pregnancy Program) and Gestational Diabetes (14w3d)    Outcome: Spoke with patient. She states she will join the appointment and does not wish to reschedule at this time.    Lorene Mazariegos RD  Diabetes Educator   Pending sale to [facility]  Diabetes and Pregnancy Program

## 2024-02-07 NOTE — TELEPHONE ENCOUNTER
Reason for Call: Gestational Diabetes (14w3d) and Hyperglycemia    Outcome: Spoke with patient and significant other. Explained to pt that it is recommended to begin insulin tonight d/t risk of complications r/t hyperglycemia during pregnancy. Pt may not be able to start insulin tonight d/t finances. If unable to start insulin tonight, recommend that patient is seen at hospital to be evaluated and start insulin as soon as possible. Pt agrees.     Lorene Mazariegos RD  Diabetes Educator   [facility]  Diabetes and Pregnancy Program

## 2024-02-07 NOTE — LETTER
February 7, 2024     Patient: Verito Mata  YOB: 1985  Date of Visit: 2/7/2024      To Whom it May Concern:    Verito Mata is under my professional care. Verito was seen for an appointment virtually on 2/7/2024. Please provide Verito with accommodations such as additional breaks. To follow our recommendations, Verito will need a break 2hrs after the start (first bite) of her meals to test her blood sugar and have a snack afterwards. We also recommend a 10 minute walk within the first hour after her meals if able.    If you have any questions or concerns, please don't hesitate to call #[phone].         Sincerely,          Lorene Mzaariegos RD        CC: No Recipients

## 2024-02-08 ENCOUNTER — TELEPHONE (OUTPATIENT)
Dept: PERINATAL CARE | Facility: CLINIC | Age: 39
End: 2024-02-08

## 2024-02-08 LAB — GLUCOSE SERPL-MCNC: 120 MG/DL (ref 65–140)

## 2024-02-08 PROCEDURE — 82948 REAGENT STRIP/BLOOD GLUCOSE: CPT

## 2024-02-08 PROCEDURE — 99284 EMERGENCY DEPT VISIT MOD MDM: CPT | Performed by: EMERGENCY MEDICINE

## 2024-02-08 PROCEDURE — 76815 OB US LIMITED FETUS(S): CPT | Performed by: EMERGENCY MEDICINE

## 2024-02-08 NOTE — ED PROVIDER NOTES
History  Chief Complaint   Patient presents with    Hyperglycemia - no symptoms     Pt reports she has gestational diabetes, reports she is unable to get to her insulin, was told she has high fasting blood sugars; OB team recommended she come to ED for eval.      HPI    39 yo female presents for evaluation of hyperglycemia.  14 weeks pregnant.  Recently diagnosed with gestational diabetes, and was prescribed long-acting insulin for management of symptoms.  She was sent in by her OB/GYN today for evaluation due to her inability to obtain insulin prescription tonight, due to financial constraints.  Last meal was about 10:35 PM.  She has no way of checking her blood sugar at home at this time, as her current glucometer strips are , and the glucometer is outdated.  Denies nausea, vomiting, diarrhea, urinary symptoms, vaginal bleeding.    Prior to Admission Medications   Prescriptions Last Dose Informant Patient Reported? Taking?   Blood Glucose Monitoring Suppl (Contour Next EZ) w/Device KIT   No No   Sig: Dispense 1 kit per insurance formulary. GDM.   Contour Next Test test strip   No No   Sig: Test 4 times a day. GDM   Insulin Pen Needle 31G X 5 MM MISC   No No   Sig: Use with insulin pen once daily at bedtime   Lantus SoloStar 100 units/mL SOPN   No No   Sig: Inject 30 units once daily at bedtime (10-11pm)   Microlet Lancets MISC   No No   Sig: Use 4 a day. GDM   Prenatal Vit w/Pr-Sguwwqoyc-HM (PNV PO)   Yes No   Sig: Take by mouth   aspirin 81 mg chewable tablet   No No   Sig: Chew 2 tablets (162 mg total) daily   ondansetron (ZOFRAN) 4 mg tablet   No No   Sig: Take 1 tablet (4 mg total) by mouth every 8 (eight) hours as needed for nausea or vomiting   Patient not taking: Reported on 2024      Facility-Administered Medications: None       Past Medical History:   Diagnosis Date    Anxiety     Depression     Ectopic pregnancy 2015    Miscarriage     2018, 2020 x 2       Past Surgical History:   Procedure  Laterality Date    APPENDECTOMY  2014    DIAGNOSTIC LAPAROSCOPY Left 2015    ectopic- left (tube was cut, not removed per pt)    MULTIPLE TOOTH EXTRACTIONS      all teeth removed       Family History   Problem Relation Age of Onset    Asthma Mother     Diabetes Mother     COPD Mother     Hepatitis Mother     Asthma Brother     Drug abuse Brother     No Known Problems Daughter     No Known Problems Son     Diabetes Maternal Grandmother     Diabetes Maternal Grandfather      I have reviewed and agree with the history as documented.    E-Cigarette/Vaping    E-Cigarette Use Never User      E-Cigarette/Vaping Substances     Social History     Tobacco Use    Smoking status: Former     Current packs/day: 0.00     Average packs/day: 0.5 packs/day for 20.0 years (10.0 ttl pk-yrs)     Types: Cigarettes     Quit date: 2023     Years since quittin.1    Smokeless tobacco: Never   Vaping Use    Vaping status: Never Used   Substance Use Topics    Alcohol use: Not Currently     Comment: occasionally- not since confirmed pregnancy    Drug use: Not Currently     Types: Marijuana     Comment: medical- quit 23 with confirmed pregnancy        Review of Systems   Constitutional:  Negative for chills and fever.   HENT:  Negative for ear pain and sore throat.    Eyes:  Negative for pain and visual disturbance.   Respiratory:  Negative for cough and shortness of breath.    Cardiovascular:  Negative for chest pain and palpitations.   Gastrointestinal:  Negative for abdominal pain and vomiting.   Genitourinary:  Negative for dysuria and hematuria.   Musculoskeletal:  Negative for arthralgias and back pain.   Skin:  Negative for color change and rash.   Neurological:  Negative for seizures and syncope.   All other systems reviewed and are negative.      Physical Exam  ED Triage Vitals [24 2332]   Temperature Pulse Respirations Blood Pressure SpO2   98 °F (36.7 °C) 97 19 134/65 99 %      Temp Source Heart Rate Source  Patient Position - Orthostatic VS BP Location FiO2 (%)   Oral Monitor Sitting Right arm --      Pain Score       --             Orthostatic Vital Signs  Vitals:    02/07/24 2332   BP: 134/65   Pulse: 97   Patient Position - Orthostatic VS: Sitting       Physical Exam  Vitals and nursing note reviewed.   Constitutional:       General: She is not in acute distress.     Appearance: She is well-developed.   HENT:      Head: Normocephalic and atraumatic.      Mouth/Throat:      Mouth: Mucous membranes are moist.   Eyes:      Conjunctiva/sclera: Conjunctivae normal.      Pupils: Pupils are equal, round, and reactive to light.   Cardiovascular:      Rate and Rhythm: Normal rate and regular rhythm.      Heart sounds: No murmur heard.  Pulmonary:      Effort: Pulmonary effort is normal. No respiratory distress.      Breath sounds: Normal breath sounds.   Abdominal:      Palpations: Abdomen is soft.      Tenderness: There is no abdominal tenderness.   Musculoskeletal:         General: No swelling.      Cervical back: Neck supple.   Skin:     General: Skin is warm and dry.      Capillary Refill: Capillary refill takes less than 2 seconds.   Neurological:      General: No focal deficit present.      Mental Status: She is alert.   Psychiatric:         Mood and Affect: Mood normal.         ED Medications  Medications - No data to display    Diagnostic Studies  Results Reviewed       Procedure Component Value Units Date/Time    Fingerstick Glucose (POCT) [[id number]]  (Normal) Collected: 02/08/24 0038    Lab Status: Final result Updated: 02/08/24 0038     POC Glucose 120 mg/dl     Fingerstick Glucose (POCT) [[id number]]  (Normal) Collected: 02/07/24 2335    Lab Status: Final result Updated: 02/07/24 2336     POC Glucose 116 mg/dl                    No orders to display         Procedures  POC Pelvic US    Date/Time: 2/8/2024 1:02 AM    Performed by: Ry Lagunas MD  Authorized by: Ry Lagunas MD    Patient location:   ED  Procedure details:     Exam Type:  Diagnostic    Indications: evaluate for IUP      Assessment for: evaluate fetal viability      Technique:  Transabdominal obstetric (HCG+) exam    Views obtained: uterus (transverse and sagittal)    Uterine findings:     Intrauterine pregnancy: identified      Single gestation: identified      Fetal heart rate: identified      Fetal heart rate (bpm):  148  Interpretation:     Pregnancy findings: intrauterine pregnancy (IUP)          ED Course                             SBIRT 20yo+      Flowsheet Row Most Recent Value   Initial Alcohol Screen: US AUDIT-C     1. How often do you have a drink containing alcohol? 0 Filed at: 02/08/2024 0010   2. How many drinks containing alcohol do you have on a typical day you are drinking?  0 Filed at: 02/08/2024 0010   3a. Male UNDER 65: How often do you have five or more drinks on one occasion? 0 Filed at: 02/08/2024 0010   3b. FEMALE Any Age, or MALE 65+: How often do you have 4 or more drinks on one occassion? 0 Filed at: 02/08/2024 0010   Audit-C Score 0 Filed at: 02/08/2024 0010   MARIA ESTHER: How many times in the past year have you...    Used an illegal drug or used a prescription medication for non-medical reasons? Never Filed at: 02/08/2024 0010                  Medical Decision Making  38-year-old female, 14 weeks pregnant, presents for evaluation of hyperglycemia.     Fingerstick glucose in ED was 116, and then 120 an hour later.  Explained to patient that because her glucose levels are normal at this time, there is no indication for her to receive insulin in the department tonight.  Patient and her fiancé states that they will be able to get the insulin tomorrow morning, and after discussion with them, they are okay with waiting till tomorrow to start the insulin.  Consulted with OB/GYN team to ensure that this plan was reasonable, and they agreed.  Patient was discharged with instructions to follow-up with her OB/GYN in the morning, for  further instructions or recommendations.     Amount and/or Complexity of Data Reviewed  Independent Historian: spouse  External Data Reviewed: labs and notes.  Labs: ordered.          Disposition  Final diagnoses:   Gestational diabetes     Time reflects when diagnosis was documented in both MDM as applicable and the Disposition within this note       Time User Action Codes Description Comment    2/8/2024  1:02 AM JannetMary Kate rasmussenwa Add [O24.419] Gestational diabetes           ED Disposition       ED Disposition   Discharge    Condition   Stable    Date/Time   Thu Feb 8, 2024 0101    Comment   Verito Mata discharge to home/self care.                   Follow-up Information       Follow up With Specialties Details Why Contact Info    Ton Mandel MD Maternal and Fetal Medicine, Obstetrics, Obstetrics and Gynecology, Gynecology   [address] [place] [address]  [phone]              Discharge Medication List as of 2/8/2024  1:07 AM        CONTINUE these medications which have NOT CHANGED    Details   aspirin 81 mg chewable tablet Chew 2 tablets (162 mg total) daily, Starting Mon 1/29/2024, Normal      Blood Glucose Monitoring Suppl (Contour Next EZ) w/Device KIT Dispense 1 kit per insurance formulary. GDM., Normal      Contour Next Test test strip Test 4 times a day. GDM, Normal      Insulin Pen Needle 31G X 5 MM MISC Use with insulin pen once daily at bedtime, Normal      Lantus SoloStar 100 units/mL SOPN Inject 30 units once daily at bedtime (10-11pm), Normal      Microlet Lancets MISC Use 4 a day. GDM, Normal      ondansetron (ZOFRAN) 4 mg tablet Take 1 tablet (4 mg total) by mouth every 8 (eight) hours as needed for nausea or vomiting, Starting Tue 11/2/2021, Normal      Prenatal Vit w/Yl-Xdeburzfj-VG (PNV PO) Take by mouth, Historical Med           No discharge procedures on file.    PDMP Review         Value Time User    PDMP Reviewed  Yes 2/8/2024 12:14 AM Jesus WESLEY  MD Kely             ED Provider  Attending physically available and evaluated Verito Mata. I managed the patient along with the ED Attending.    Electronically Signed by           Ry Lagunas MD  02/08/24 0155

## 2024-02-08 NOTE — ED ATTENDING ATTESTATION
2/7/2024  I, Jesus Edge MD, saw and evaluated the patient. I have discussed the patient with the resident/non-physician practitioner and agree with the resident's/non-physician practitioner's findings, Plan of Care, and MDM as documented in the resident's/non-physician practitioner's note, except where noted. All available labs and Radiology studies were reviewed.  I was present for key portions of any procedure(s) performed by the resident/non-physician practitioner and I was immediately available to provide assistance.       At this point I agree with the current assessment done in the Emergency Department.  I have conducted an independent evaluation of this patient a history and physical is as follows: Patient is a 38 year old female who is 14 weeks pregnant and has had high blood sugar and is supposed to be started on insulin but has not been able to get this. No N/V/D. No urinary sx. No fever. No vaginal bleeding. Was told to come to ED for evaluation by OB/GYN. Was last seen in this ED on 2/4/24 for vaginal bleeding and 14 weeks pregnant. PMPAWARERX website checked on this patient and no Rx found. NCAT. Moist mucous membranes. No scleral icterus. Lungs clear. Heart regular without murmur. Abdomen soft and nontender. Good bowel sounds. No edema. No rash noted. No jaundice. Differential diagnosis including but not limited to: DKA, hyperglycemia, metabolic abnormality, dehydration; doubt UTI, cardiac etiology, intracranial process. Will check labs and FHR and notify OB/GYN resident .    ED Course         Critical Care Time  Procedures

## 2024-02-08 NOTE — TELEPHONE ENCOUNTER
"Reason for Call: Pt Requested Call (Via Thrupoint Message) and Gestational Diabetes (14w4d)    Outcome: Spoke with patient. She went to the ED last night and had normal BG at this time. Therefore was not start on insulin. She is continuing to check her blood sugars with the  testing strips at this time till she sees her fiance after work. Her fiance is an EMT. Her fiance is bringing her home a meter, testing strips, and disposable lancets. The brand of the meter and testing strips is \"Microdot Extra\".     She has not picked up the insulin yet. Reports cost of insulin was $45 and cost of lancets was $45. She called the insurance company but they were not able to tell her which insulin was covered as they told her she needed to give the specific names of the insulins in order for them to determine if they were covered or not.     She doing her best to follow the meal plan recommendations and trying to be consistent with timing but it is hard for her while she is at work. She was provided a letter for work to request accommodations such as additional breaks to help her follow meal plan and blood sugar monitoring recommendations. She did not yet speak with her employer at this time but plans on doing so soon. Due to work, it is very difficult to have snacks. Since she goes long period of time without eating she ends up very hungry at night before bed.     She had several questions regarding her blood sugars and whether she should begin insulin or not due to her blood sugars being normal when she was at the hospital last night.    Reported Blood Sugars:  Bedtime: 2am  FBG: (8:45am) 166 *only 6hrs 45min fasting  Breakfast (9:45am): 1 slice of plain white toast with 3 slices of avocado + 1 egg + 10 blueberries  After Breakfast (11:40am): 176 *2hrs after the first bite of breakfast    - Encouraged to discuss importance of following meal plan and testing blood sugars with her employer so that she is able to have 3 meals " and 3 snacks daily as recommended  - Explained importance of starting insulin to improve control of blood sugars as soon as possible  - Reviewed carbohydrate and protein recommendations per meal plan   - Will discuss with ELIER Gray and follow-up with patient with any additional recommendations    Start: 2:39pm  End: 3:00pm  Duration: 21min    Lorene Mazariegos RD  Diabetes Educator   [facility]  Diabetes and Pregnancy Program

## 2024-02-10 ENCOUNTER — NURSE TRIAGE (OUTPATIENT)
Dept: OTHER | Facility: OTHER | Age: 39
End: 2024-02-10

## 2024-02-10 NOTE — TELEPHONE ENCOUNTER
"Reason for Disposition  • General information question, no triage required and triager able to answer question    Answer Assessment - Initial Assessment Questions  1. REASON FOR CALL or QUESTION: \"What is your reason for calling today?\" or \"How can I best help you?\" or \"What question do you have that I can help answer?\"      Patient calling for perinatology. We do not triage for service so provider notified to call patient    Protocols used: Information Only Call - No Triage-ADULT-    "

## 2024-02-10 NOTE — TELEPHONE ENCOUNTER
"Regarding: 15 wks pregnant- elevated blood sugar  ----- Message from Madeleine Lincoln sent at 2/10/2024 10:34 AM EST -----  \"I'm 15 weeks pregnant and have gestational diabetes.  I was put on insulin 2 days ago but it still seems pretty high. Last night after dinner it was 204 and this morning when I woke up it was 162 after eating breakfast,  it was 146 and I'm supposed to call my OB if I have 2 readings over 160 in one day.\"    "

## 2024-02-10 NOTE — TELEPHONE ENCOUNTER
"Answer Assessment - Initial Assessment Questions  1. REASON FOR CALL or QUESTION: \"What is your reason for calling today?\" or \"How can I best help you?\" or \"What question do you have that I can help answer?\"      Pt calling back to let provider know she will be starting 40 units of insulin and will be coming in Monday for inpatient admission and monitoring. Pt would like to know where she is to go on Monday, L&D or ED for admission d/t hyperglycemia in pregnancy?    Protocols used: Information Only Call - No Triage-ADULT-    "

## 2024-02-10 NOTE — TELEPHONE ENCOUNTER
"Regardin weeks preg / Hyperglycemia update  ----- Message from Collette Warner sent at 2/10/2024 12:40 PM EST -----  \" I was talking to a Dr earlier and I wanted to let him now I am going to start the 40 units of insulin and that I will be coming in for in patient Monday morning. I had a few more questions\"    "

## 2024-02-12 ENCOUNTER — HOSPITAL ENCOUNTER (INPATIENT)
Facility: HOSPITAL | Age: 39
LOS: 1 days | Discharge: HOME | DRG: 781 | End: 2024-02-13
Attending: OBSTETRICS & GYNECOLOGY | Admitting: OBSTETRICS & GYNECOLOGY
Payer: COMMERCIAL

## 2024-02-12 DIAGNOSIS — O99.810 ABNORMAL GLUCOSE AFFECTING PREGNANCY: ICD-10-CM

## 2024-02-12 DIAGNOSIS — Z3A.15 15 WEEKS GESTATION OF PREGNANCY: ICD-10-CM

## 2024-02-12 DIAGNOSIS — Z3A.14 14 WEEKS GESTATION OF PREGNANCY: ICD-10-CM

## 2024-02-12 DIAGNOSIS — O24.911 DIABETES MELLITUS AFFECTING PREGNANCY, FIRST TRIMESTER: Primary | ICD-10-CM

## 2024-02-12 DIAGNOSIS — O24.419 GESTATIONAL DIABETES MELLITUS (GDM) IN SECOND TRIMESTER, GESTATIONAL DIABETES METHOD OF CONTROL UNSPECIFIED: ICD-10-CM

## 2024-02-12 PROBLEM — O24.912 DIABETES MELLITUS AFFECTING PREGNANCY IN SECOND TRIMESTER: Status: ACTIVE | Noted: 2024-01-29

## 2024-02-12 LAB
ABO GROUP BLD: NORMAL
ALBUMIN SERPL BCP-MCNC: 3.3 G/DL (ref 3.5–5)
ALP SERPL-CCNC: 46 U/L (ref 34–104)
ALT SERPL W P-5'-P-CCNC: 12 U/L (ref 7–52)
ANION GAP SERPL CALCULATED.3IONS-SCNC: 6 MMOL/L
AST SERPL W P-5'-P-CCNC: 10 U/L (ref 13–39)
BASOPHILS # BLD AUTO: 0.02 THOUSANDS/ÂΜL (ref 0–0.1)
BASOPHILS NFR BLD AUTO: 0 % (ref 0–1)
BILIRUB SERPL-MCNC: 0.16 MG/DL (ref 0.2–1)
BLD GP AB SCN SERPL QL: NEGATIVE
BUN SERPL-MCNC: 10 MG/DL (ref 5–25)
CALCIUM ALBUM COR SERPL-MCNC: 9.5 MG/DL (ref 8.3–10.1)
CALCIUM SERPL-MCNC: 8.9 MG/DL (ref 8.4–10.2)
CHLORIDE SERPL-SCNC: 104 MMOL/L (ref 96–108)
CO2 SERPL-SCNC: 25 MMOL/L (ref 21–32)
CREAT SERPL-MCNC: 0.46 MG/DL (ref 0.6–1.3)
EOSINOPHIL # BLD AUTO: 0.12 THOUSAND/ÂΜL (ref 0–0.61)
EOSINOPHIL NFR BLD AUTO: 1 % (ref 0–6)
ERYTHROCYTE [DISTWIDTH] IN BLOOD BY AUTOMATED COUNT: 15.8 % (ref 11.6–15.1)
GFR SERPL CREATININE-BSD FRML MDRD: 128 ML/MIN/1.73SQ M
GLUCOSE SERPL-MCNC: 106 MG/DL (ref 65–140)
GLUCOSE SERPL-MCNC: 119 MG/DL (ref 65–140)
GLUCOSE SERPL-MCNC: 160 MG/DL (ref 65–140)
GLUCOSE SERPL-MCNC: 164 MG/DL (ref 65–140)
HCT VFR BLD AUTO: 37.5 % (ref 34.8–46.1)
HGB BLD-MCNC: 12.7 G/DL (ref 11.5–15.4)
IMM GRANULOCYTES # BLD AUTO: 0.03 THOUSAND/UL (ref 0–0.2)
IMM GRANULOCYTES NFR BLD AUTO: 0 % (ref 0–2)
LYMPHOCYTES # BLD AUTO: 2.32 THOUSANDS/ÂΜL (ref 0.6–4.47)
LYMPHOCYTES NFR BLD AUTO: 23 % (ref 14–44)
MCH RBC QN AUTO: 28.1 PG (ref 26.8–34.3)
MCHC RBC AUTO-ENTMCNC: 33.9 G/DL (ref 31.4–37.4)
MCV RBC AUTO: 83 FL (ref 82–98)
MONOCYTES # BLD AUTO: 0.4 THOUSAND/ÂΜL (ref 0.17–1.22)
MONOCYTES NFR BLD AUTO: 4 % (ref 4–12)
NEUTROPHILS # BLD AUTO: 7.03 THOUSANDS/ÂΜL (ref 1.85–7.62)
NEUTS SEG NFR BLD AUTO: 72 % (ref 43–75)
NRBC BLD AUTO-RTO: 0 /100 WBCS
PLATELET # BLD AUTO: 252 THOUSANDS/UL (ref 149–390)
PMV BLD AUTO: 10.7 FL (ref 8.9–12.7)
POTASSIUM SERPL-SCNC: 3.7 MMOL/L (ref 3.5–5.3)
PROT SERPL-MCNC: 6.3 G/DL (ref 6.4–8.4)
RBC # BLD AUTO: 4.52 MILLION/UL (ref 3.81–5.12)
RH BLD: POSITIVE
SODIUM SERPL-SCNC: 135 MMOL/L (ref 135–147)
SPECIMEN EXPIRATION DATE: NORMAL
WBC # BLD AUTO: 9.92 THOUSAND/UL (ref 4.31–10.16)

## 2024-02-12 PROCEDURE — 99222 1ST HOSP IP/OBS MODERATE 55: CPT | Performed by: OBSTETRICS & GYNECOLOGY

## 2024-02-12 PROCEDURE — 80053 COMPREHEN METABOLIC PANEL: CPT

## 2024-02-12 PROCEDURE — 86900 BLOOD TYPING SEROLOGIC ABO: CPT

## 2024-02-12 PROCEDURE — 82948 REAGENT STRIP/BLOOD GLUCOSE: CPT

## 2024-02-12 PROCEDURE — 85025 COMPLETE CBC W/AUTO DIFF WBC: CPT

## 2024-02-12 PROCEDURE — 4A1HXCZ MONITORING OF PRODUCTS OF CONCEPTION, CARDIAC RATE, EXTERNAL APPROACH: ICD-10-PCS | Performed by: OBSTETRICS & GYNECOLOGY

## 2024-02-12 PROCEDURE — NC001 PR NO CHARGE: Performed by: OBSTETRICS & GYNECOLOGY

## 2024-02-12 PROCEDURE — 86850 RBC ANTIBODY SCREEN: CPT

## 2024-02-12 PROCEDURE — 86901 BLOOD TYPING SEROLOGIC RH(D): CPT

## 2024-02-12 RX ORDER — ASPIRIN 81 MG/1
162 TABLET, CHEWABLE ORAL
Status: DISCONTINUED | OUTPATIENT
Start: 2024-02-12 | End: 2024-02-13 | Stop reason: HOSPADM

## 2024-02-12 RX ORDER — INSULIN GLARGINE 100 [IU]/ML
40 INJECTION, SOLUTION SUBCUTANEOUS
Status: DISCONTINUED | OUTPATIENT
Start: 2024-02-12 | End: 2024-02-13 | Stop reason: HOSPADM

## 2024-02-12 RX ORDER — INSULIN LISPRO 100 [IU]/ML
10 INJECTION, SOLUTION INTRAVENOUS; SUBCUTANEOUS
Status: DISCONTINUED | OUTPATIENT
Start: 2024-02-12 | End: 2024-02-13 | Stop reason: HOSPADM

## 2024-02-12 RX ORDER — ASPIRIN 81 MG/1
162 TABLET, CHEWABLE ORAL DAILY
Status: DISCONTINUED | OUTPATIENT
Start: 2024-02-12 | End: 2024-02-12

## 2024-02-12 RX ORDER — ACETAMINOPHEN 325 MG/1
975 TABLET ORAL EVERY 6 HOURS PRN
Status: DISCONTINUED | OUTPATIENT
Start: 2024-02-12 | End: 2024-02-13 | Stop reason: HOSPADM

## 2024-02-12 RX ADMIN — ACETAMINOPHEN 975 MG: 325 TABLET, FILM COATED ORAL at 16:30

## 2024-02-12 RX ADMIN — INSULIN GLARGINE 40 UNITS: 100 INJECTION, SOLUTION SUBCUTANEOUS at 21:48

## 2024-02-12 RX ADMIN — INSULIN LISPRO 10 UNITS: 100 INJECTION, SOLUTION INTRAVENOUS; SUBCUTANEOUS at 18:19

## 2024-02-12 RX ADMIN — INSULIN LISPRO 10 UNITS: 100 INJECTION, SOLUTION INTRAVENOUS; SUBCUTANEOUS at 13:57

## 2024-02-12 RX ADMIN — ASPIRIN 81 MG 162 MG: 81 TABLET ORAL at 18:21

## 2024-02-12 NOTE — PLAN OF CARE
Problem: ANTEPARTUM  Goal: Maintain pregnancy as long as maternal and/or fetal condition is stable  Description: INTERVENTIONS:  - Maternal surveillance  - Fetal surveillance  - Monitor uterine activity  - Medications as ordered  - Bedrest  Outcome: Progressing     Problem: PAIN - ADULT  Goal: Verbalizes/displays adequate comfort level or baseline comfort level  Description: Interventions:  - Encourage patient to monitor pain and request assistance  - Assess pain using appropriate pain scale  - Administer analgesics based on type and severity of pain and evaluate response  - Implement non-pharmacological measures as appropriate and evaluate response  - Consider cultural and social influences on pain and pain management  - Notify physician/advanced practitioner if interventions unsuccessful or patient reports new pain  Outcome: Progressing     Problem: INFECTION - ADULT  Goal: Absence or prevention of progression during hospitalization  Description: INTERVENTIONS:  - Assess and monitor for signs and symptoms of infection  - Monitor lab/diagnostic results  - Monitor all insertion sites, i.e. indwelling lines, tubes, and drains  - Monitor endotracheal if appropriate and nasal secretions for changes in amount and color  - [place] appropriate cooling/warming therapies per order  - Administer medications as ordered  - Instruct and encourage patient and family to use good hand hygiene technique  - Identify and instruct in appropriate isolation precautions for identified infection/condition  Outcome: Progressing  Goal: Absence of fever/infection during neutropenic period  Description: INTERVENTIONS:  - Monitor WBC    Outcome: Progressing     Problem: SAFETY ADULT  Goal: Patient will remain free of falls  Description: INTERVENTIONS:  - Educate patient/family on patient safety including physical limitations  - Instruct patient to call for assistance with activity   - Consult OT/PT to assist with strengthening/mobility   -  Keep Call bell within reach  - Keep bed low and locked with side rails adjusted as appropriate  - Keep care items and personal belongings within reach  - Initiate and maintain comfort rounds  - Make Fall Risk Sign visible to staff  - Apply yellow socks and bracelet for high fall risk patients  - Consider moving patient to room near nurses station  Outcome: Progressing  Goal: Maintain or return to baseline ADL function  Description: INTERVENTIONS:  -  Assess patient's ability to carry out ADLs; assess patient's baseline for ADL function and identify physical deficits which impact ability to perform ADLs (bathing, care of mouth/teeth, toileting, grooming, dressing, etc.)  - Assess/evaluate cause of self-care deficits   - Assess range of motion  - Assess patient's mobility; develop plan if impaired  - Assess patient's need for assistive devices and provide as appropriate  - Encourage maximum independence but intervene and supervise when necessary  - Involve family in performance of ADLs  - Assess for home care needs following discharge   - Consider OT consult to assist with ADL evaluation and planning for discharge  - Provide patient education as appropriate  Outcome: Progressing  Goal: Maintains/Returns to pre admission functional level  Description: INTERVENTIONS:  - Perform AM-PAC 6 Click Basic Mobility/ Daily Activity assessment daily.  - Set and communicate daily mobility goal to care team and patient/family/caregiver.   - Collaborate with rehabilitation services on mobility goals if consulted  - Out of bed for toileting  - Record patient progress and toleration of activity level   Outcome: Progressing     Problem: Knowledge Deficit  Goal: Patient/family/caregiver demonstrates understanding of disease process, treatment plan, medications, and discharge instructions  Description: Complete learning assessment and assess knowledge base.  Interventions:  - Provide teaching at level of understanding  - Provide teaching  via preferred learning methods  Outcome: Progressing     Problem: DISCHARGE PLANNING  Goal: Discharge to home or other facility with appropriate resources  Description: INTERVENTIONS:  - Identify barriers to discharge w/patient and caregiver  - Arrange for needed discharge resources and transportation as appropriate  - Identify discharge learning needs (meds, wound care, etc.)  - Arrange for interpretive services to assist at discharge as needed  - Refer to Case Management Department for coordinating discharge planning if the patient needs post-hospital services based on physician/advanced practitioner order or complex needs related to functional status, cognitive ability, or social support system  Outcome: Progressing

## 2024-02-12 NOTE — PLAN OF CARE
Problem: ANTEPARTUM  Goal: Maintain pregnancy as long as maternal and/or fetal condition is stable  Description: INTERVENTIONS:  - Maternal surveillance  - Fetal surveillance  - Monitor uterine activity  - Medications as ordered  - Bedrest  Outcome: Progressing     Problem: PAIN - ADULT  Goal: Verbalizes/displays adequate comfort level or baseline comfort level  Description: Interventions:  - Encourage patient to monitor pain and request assistance  - Assess pain using appropriate pain scale  - Administer analgesics based on type and severity of pain and evaluate response  - Implement non-pharmacological measures as appropriate and evaluate response  - Consider cultural and social influences on pain and pain management  - Notify physician/advanced practitioner if interventions unsuccessful or patient reports new pain  Outcome: Progressing     Problem: INFECTION - ADULT  Goal: Absence or prevention of progression during hospitalization  Description: INTERVENTIONS:  - Assess and monitor for signs and symptoms of infection  - Monitor lab/diagnostic results  - Monitor all insertion sites, i.e. indwelling lines, tubes, and drains  - Monitor endotracheal if appropriate and nasal secretions for changes in amount and color  - [place] appropriate cooling/warming therapies per order  - Administer medications as ordered  - Instruct and encourage patient and family to use good hand hygiene technique  - Identify and instruct in appropriate isolation precautions for identified infection/condition  Outcome: Progressing  Goal: Absence of fever/infection during neutropenic period  Description: INTERVENTIONS:  - Monitor WBC    Outcome: Progressing     Problem: SAFETY ADULT  Goal: Patient will remain free of falls  Description: INTERVENTIONS:  - Educate patient/family on patient safety including physical limitations  - Instruct patient to call for assistance with activity   - Consult OT/PT to assist with strengthening/mobility   -  Keep Call bell within reach  - Keep bed low and locked with side rails adjusted as appropriate  - Keep care items and personal belongings within reach  - Initiate and maintain comfort rounds  - Apply yellow socks and bracelet for high fall risk patients  - Consider moving patient to room near nurses station  Outcome: Progressing  Goal: Maintain or return to baseline ADL function  Description: INTERVENTIONS:  -  Assess patient's ability to carry out ADLs; assess patient's baseline for ADL function and identify physical deficits which impact ability to perform ADLs (bathing, care of mouth/teeth, toileting, grooming, dressing, etc.)  - Assess/evaluate cause of self-care deficits   - Assess range of motion  - Assess patient's mobility; develop plan if impaired  - Assess patient's need for assistive devices and provide as appropriate  - Encourage maximum independence but intervene and supervise when necessary  - Involve family in performance of ADLs  - Assess for home care needs following discharge   - Consider OT consult to assist with ADL evaluation and planning for discharge  - Provide patient education as appropriate  Outcome: Progressing  Goal: Maintains/Returns to pre admission functional level  Description: INTERVENTIONS:  - Perform AM-PAC 6 Click Basic Mobility/ Daily Activity assessment daily.  - Set and communicate daily mobility goal to care team and patient/family/caregiver.   - Collaborate with rehabilitation services on mobility goals if consulted  - Out of bed for toileting  - Record patient progress and toleration of activity level   Outcome: Progressing     Problem: Knowledge Deficit  Goal: Patient/family/caregiver demonstrates understanding of disease process, treatment plan, medications, and discharge instructions  Description: Complete learning assessment and assess knowledge base.  Interventions:  - Provide teaching at level of understanding  - Provide teaching via preferred learning  methods  Outcome: Progressing     Problem: DISCHARGE PLANNING  Goal: Discharge to home or other facility with appropriate resources  Description: INTERVENTIONS:  - Identify barriers to discharge w/patient and caregiver  - Arrange for needed discharge resources and transportation as appropriate  - Identify discharge learning needs (meds, wound care, etc.)  - Arrange for interpretive services to assist at discharge as needed  - Refer to Case Management Department for coordinating discharge planning if the patient needs post-hospital services based on physician/advanced practitioner order or complex needs related to functional status, cognitive ability, or social support system  Outcome: Progressing

## 2024-02-12 NOTE — ASSESSMENT & PLAN NOTE
Lab Results   Component Value Date    HGBA1C 7.0 (H) 02/05/2024       Recent Labs     02/12/24  0926 02/12/24  1617 02/12/24 2002 02/13/24  0625   POCGLU 164* 106 119 99       Blood Sugar Average: Last 72 hrs:  (P) 122    Newly diagnosed   On Lantus 40 u nightly, added on Humalog 10u with every meal  Fasting and 2h post-prandial POCT  Diabetic diet

## 2024-02-12 NOTE — H&P
H & P- Obstetrics   Vertio Mata 38 y.o. female MRN: [id number]  Unit/Bed#: -01 Encounter: [id number]    Assessment: 38 y.o.  at 15w1d direct admit to [facility] for newly diagnosed T2DM and medication titration for blood glucose control.   SVE: deferred  FHT: 160s bpm  GBS status: unknown   Postpartum contraception plan: undecided    Plan:   Diabetes mellitus affecting pregnancy, first trimester  Assessment & Plan  New diagnosis of T2DM  Admit to OB/GYN and consult [facility] for blood sugar control     Lab Results   Component Value Date    HGBA1C 7.0 (H) 2024       Recent Labs     24  0926   POCGLU 164*       Blood Sugar Average: Last 72 hrs:  (P) 164      Obesity complicating pregnancy, childbirth, or puerperium, antepartum  Assessment & Plan  BMI 38    Multigravida of advanced maternal age in first trimester  Assessment & Plan  39 yo     15 weeks gestation of pregnancy  Assessment & Plan  Admit to OB/GYN  Inpatient consult to [facility]          Discussed case and plan w/ Dr. Burch      Chief Complaint: new diabetes    HPI: Verito Mata is a 38 y.o.  with an VIBHA of 2024, by Ultrasound at 15w1d who is being admitted for medication titration for blood sugar control in the setting of newly diagnosed T2DM. She denies having uterine contractions, has no LOF, and reports no VB. She reports her fasting sugar was 157 bpm this morning. She reports she feels palpations sometimes when drinking caffeine so she doesn't drink caffeine anymore.     Patient Active Problem List   Diagnosis    15 weeks gestation of pregnancy    Nausea/vomiting in pregnancy    Tetrahydrocannabinol (THC) use disorder, mild, abuse    Multigravida of advanced maternal age in first trimester    Obesity complicating pregnancy, childbirth, or puerperium, antepartum    Diabetes mellitus affecting pregnancy, first trimester    Family history of diabetes mellitus in mother    Gestational diabetes mellitus (GDM) in first  trimester    BMI 38.0-38.9,adult    Abnormal glucose affecting pregnancy    Spotting in pregnancy       Baby complications/comments: THC use, maternal obesity, maternal T2DM    Review of Systems   Constitutional:  Negative for chills and fever.   HENT:  Negative for congestion, rhinorrhea, sneezing and sore throat.    Eyes:  Negative for photophobia and visual disturbance.   Respiratory:  Negative for cough and shortness of breath.    Cardiovascular:  Negative for chest pain.   Gastrointestinal:  Negative for diarrhea, nausea and vomiting.   Genitourinary:  Negative for dysuria and frequency.   Neurological:  Negative for dizziness, numbness and headaches.   Psychiatric/Behavioral: Negative.         OB Hx:  OB History    Para Term  AB Living   7 2 2 0 4 2   SAB IAB Ectopic Multiple Live Births   3 0 1 0 2      # Outcome Date GA Lbr Amaury/2nd Weight Sex Delivery Anes PTL Lv   7 Current            6 SAB 2020 10w0d    SAB      5 SAB 2020     SAB      4 SAB      SAB      3 Ectopic 2015     ECTOPIC      2 Term 13 40w0d  3175 g (7 lb) F Vag-Spont EPI  TERRELL      Complications: Shoulder Dystocia   1 Term 06 40w0d  3175 g (7 lb) M Vag-Spont EPI  TERRELL       Past Medical Hx:  Past Medical History:   Diagnosis Date    Anxiety     Depression     Ectopic pregnancy 2015    Miscarriage     , 2020 x 2    Type 2 diabetes mellitus affecting pregnancy in second trimester, antepartum        Past Surgical hx:  Past Surgical History:   Procedure Laterality Date    APPENDECTOMY  2014    DIAGNOSTIC LAPAROSCOPY Left     ectopic- left (tube was cut, not removed per pt)    MULTIPLE TOOTH EXTRACTIONS      all teeth removed       Social Hx:  Alcohol use: denies  Tobacco use: denies  Other substance use: THC use      No Known Allergies    Medications Prior to Admission   Medication    aspirin 81 mg chewable tablet    Blood Glucose Monitoring Suppl (Contour Next EZ) w/Device KIT    Contour Next Test  test strip    Insulin Pen Needle 31G X 5 MM MISC    Lantus SoloStar 100 units/mL SOPN    Microlet Lancets MISC    Prenatal Vit w/Bn-Raoupjcri-TG (PNV PO)    ondansetron (ZOFRAN) 4 mg tablet       Objective:  Temp:  [97.7 °F (36.5 °C)] 97.7 °F (36.5 °C)  HR:  [] 97  Resp:  [18] 18  BP: (117)/(69) 117/69  Body mass index is 38.11 kg/m².     Physical Exam:  Physical Exam  Constitutional:       General: She is not in acute distress.     Appearance: Normal appearance. She is not ill-appearing.   Genitourinary:      Genitourinary Comments: SVE deferred   HENT:      Head: Normocephalic and atraumatic.      Mouth/Throat:      Mouth: Mucous membranes are moist. No oral lesions.   Eyes:      General: No scleral icterus.     Extraocular Movements: Extraocular movements intact.   Cardiovascular:      Rate and Rhythm: Normal rate and regular rhythm.      Pulses: Normal pulses.      Heart sounds: Normal heart sounds. No murmur heard.     No gallop.   Pulmonary:      Effort: Pulmonary effort is normal. No respiratory distress.      Breath sounds: Normal breath sounds.   Abdominal:      General: Abdomen is flat. There is no distension.      Palpations: Abdomen is soft.      Tenderness: There is no abdominal tenderness. There is no guarding.   Musculoskeletal:      Right lower leg: No edema.      Left lower leg: No edema.   Neurological:      General: No focal deficit present.      Mental Status: She is alert and oriented to person, place, and time.   Skin:     General: Skin is warm and dry.   Psychiatric:         Attention and Perception: Attention normal.         Mood and Affect: Mood normal.         Speech: Speech normal.         Behavior: Behavior normal.         Thought Content: Thought content normal.         Judgment: Judgment normal.   Vitals and nursing note reviewed. Exam conducted with a chaperone present.            FHT:  160s bpm      Lab Results   Component Value Date    WBC 9.92 02/12/2024    HGB 12.7 02/12/2024     HCT 37.5 02/12/2024     02/12/2024     Lab Results   Component Value Date    K 3.5 12/11/2023     12/11/2023    CO2 25 12/11/2023    BUN 10 12/11/2023    CREATININE 0.68 12/11/2023    AST 13 12/11/2023    ALT 22 12/11/2023     Prenatal Labs: Reviewed      Blood type: O pos  Antibody: neg  GBS: unknown, plan for swab at 36 weeks  HIV: non-reactive  Rubella: immune  Syphilis IgM/IgG: non-reactive  HBsAg: non-reactive  HCAb: non-reactive  Chlamydia: neg  Gonorrhea: neg  Diabetes 1 hour screen: 285  3 hour glucose: n/a  Platelets: 281  Hgb: 13.6  >2 Midnights  INPATIENT     Signature/Title: Eva Manley MD  Date: 2/12/2024  Time: 10:19 AM

## 2024-02-12 NOTE — CONSULTS
Consultation - Maternal Fetal Medicine   Verito Mata 38 y.o. female MRN: [id number]  Unit/Bed#: -01 Encounter: [id number]  Admit date: 2024.  Today's date: 24    Assessment/Plan   Ms. Mata is a 38 y.o. year-old  at 15w1d, Hospital Day: 1, admitted for glucose control after new diagnosis of T2DM.  By issue:    15 weeks gestation of pregnancy  Assessment & Plan  Prenatal labs wnl  NIPS low risk  Diabetic diet  Daily FHT    Diabetes mellitus affecting pregnancy, first trimester  Assessment & Plan  Lab Results   Component Value Date    HGBA1C 7.0 (H) 2024       Recent Labs     24  0926   POCGLU 164*       Blood Sugar Average: Last 72 hrs:  (P) 164    Newly diagnosed   On Lantus 40 u nightly, will add on Humalog 10u with every meal  Fasting and 2h post-prandial POCT  Diabetic diet        Multigravida of advanced maternal age in first trimester  Assessment & Plan  38 years of age  NIPS low risk           Chief Complaint   Patient presents with    Hyperglycemia       Physician Requesting Consult: Kathryn Burch DO  Reason for Consult / Principal Problem: Hyperglycemia   Subspeciality: Perinatology    HPI: Ms. Mata is a 38 y.o. year-old  with an VIBHA of Estimated Date of Delivery: 24 at 15w1d, Hospital Day: 1, admitted for glucose control in the setting of newly diagnosed T2DM. She has been taking Lantus nighty and was titrated up to 40u on Saturday. She is uncomfortable injecting the insulin in her abdomen because she is pregnant; she has been injecting herself in the arms. Her current pregnancy is significant for obesity, AMA.  Her obstetrical history is significant for two vaginal deliveries, last one 10 years ago, complicated by shoulder dystocia.      Other obstetric review of symptoms:  Contractions: None.    Leakage of fluid: None.    Bleeding: None.    Fetal movement: not present.      Review of Systems   Constitutional:  Negative for chills and fever.    Eyes:  Negative for visual disturbance.   Respiratory:  Negative for chest tightness and shortness of breath.    Cardiovascular:  Negative for chest pain and palpitations.   Gastrointestinal:  Negative for abdominal pain.   Genitourinary:  Negative for decreased urine volume, vaginal bleeding, vaginal discharge and vaginal pain.   Musculoskeletal:  Negative for back pain.   Neurological:  Negative for headaches.   Hematological: Negative.    Psychiatric/Behavioral: Negative.         Other history is as follows:    Historical Information   OB History    Para Term  AB Living   7 2 2 0 4 2   SAB IAB Ectopic Multiple Live Births   3 0 1 0 2      # Outcome Date GA Lbr Amaury/2nd Weight Sex Delivery Anes PTL Lv   7 Current            6 SAB 2020 10w0d    SAB      5 SAB 2020     SAB      4 SAB      SAB      3 Ectopic 2015     ECTOPIC      2 Term 13 40w0d  3175 g (7 lb) F Vag-Spont EPI  TERRELL      Complications: Shoulder Dystocia   1 Term 06 40w0d  3175 g (7 lb) M Vag-Spont EPI  TERRELL     Gynecologic history: Patient's last menstrual period was 10/15/2023.     Past Medical History:   Diagnosis Date    Anxiety     Depression     Ectopic pregnancy 2015    Miscarriage     , 2020 x 2    Type 2 diabetes mellitus affecting pregnancy in second trimester, antepartum      Past Surgical History:   Procedure Laterality Date    APPENDECTOMY  2014    DIAGNOSTIC LAPAROSCOPY Left     ectopic- left (tube was cut, not removed per pt)    MULTIPLE TOOTH EXTRACTIONS      all teeth removed     Social History   Social History     Substance and Sexual Activity   Alcohol Use Not Currently    Comment: occasionally- not since confirmed pregnancy     Social History     Substance and Sexual Activity   Drug Use Not Currently    Types: Marijuana    Comment: medical- quit 23 with confirmed pregnancy     Social History     Tobacco Use   Smoking Status Former    Current packs/day: 0.00    Average  "packs/day: 0.5 packs/day for 20.0 years (10.0 ttl pk-yrs)    Types: Cigarettes    Quit date: 2023    Years since quittin.1   Smokeless Tobacco Never     Family History: non-contributory    Meds/Allergies   Prior to Admission Medications   Prescriptions Last Dose Informant Patient Reported? Taking?   Blood Glucose Monitoring Suppl (Contour Next EZ) w/Device KIT 2024  No Yes   Sig: Dispense 1 kit per insurance formulary. GDM.   Contour Next Test test strip 2024  No Yes   Sig: Test 4 times a day. GDM   Insulin Pen Needle 31G X 5 MM MISC 2024  No Yes   Sig: Use with insulin pen once daily at bedtime   Lantus SoloStar 100 units/mL SOPN 2024 at 2330  No Yes   Sig: Inject 30 units once daily at bedtime (10-11pm)   Patient taking differently: Inject 40 Units under the skin daily at bedtime Inject 40 units once daily at bedtime (10-11pm)   Microlet Lancets MISC 2024  No Yes   Sig: Use 4 a day. GDM   Prenatal Vit w/Rf-Kpgwoufnh-UA (PNV PO) 2024  Yes Yes   Sig: Take by mouth   aspirin 81 mg chewable tablet 2024 at 1800  No Yes   Sig: Chew 2 tablets (162 mg total) daily   ondansetron (ZOFRAN) 4 mg tablet   No No   Sig: Take 1 tablet (4 mg total) by mouth every 8 (eight) hours as needed for nausea or vomiting   Patient not taking: Reported on 2024      Facility-Administered Medications: None       Current Facility-Administered Medications   Medication Dose Route Frequency    aspirin chewable tablet 162 mg  162 mg Oral Daily With Dinner    insulin glargine (LANTUS) subcutaneous injection 40 Units 0.4 mL  40 Units Subcutaneous HS     No Known Allergies    Objective    Patient Vitals for the past 24 hrs:   BP Temp Temp src Pulse Resp Height Weight   24 0913 117/69 -- -- 97 -- -- --   24 0857 -- 97.7 °F (36.5 °C) Oral (!) 109 18 5' 4\" (1.626 m) 101 kg (222 lb)     Vitals: Blood pressure 117/69, pulse 97, temperature 97.7 °F (36.5 °C), temperature source Oral, resp. rate " "18, height 5' 4\" (1.626 m), weight 101 kg (222 lb), last menstrual period 10/15/2023.Body mass index is 38.11 kg/m².    Physical Exam  Constitutional:       Appearance: She is obese.   HENT:      Head: Normocephalic.   Eyes:      Conjunctiva/sclera: Conjunctivae normal.   Cardiovascular:      Rate and Rhythm: Normal rate.      Pulses: Normal pulses.   Pulmonary:      Effort: Pulmonary effort is normal.   Abdominal:      Palpations: Abdomen is soft.      Tenderness: There is no abdominal tenderness.   Skin:     General: Skin is warm.      Capillary Refill: Capillary refill takes less than 2 seconds.   Neurological:      Mental Status: She is alert and oriented to person, place, and time.   Psychiatric:         Behavior: Behavior normal.       Cervix: Not evaluated.    Prenatal Labs:   Blood type: O pos  Antibody: neg  GBS: unknown, plan for swab at 36 weeks  HIV: non-reactive  Rubella: immune  Syphilis IgM/IgG: non-reactive  HBsAg: non-reactive  HCAb: non-reactive  Chlamydia: neg  Gonorrhea: neg  Diabetes 1 hour screen: 285  3 hour glucose: n/a  Platelets: 281  Hgb: 13.6    Results from last 7 days   Lab Units 02/12/24  0953   WBC Thousand/uL 9.92   NEUTROS ABS Thousands/µL 7.03   HEMOGLOBIN g/dL 12.7   MCV fL 83   PLATELETS Thousands/uL 252     Results from last 7 days   Lab Units 02/12/24  0953   NEUTROS PCT % 72   MONOS PCT % 4   EOS PCT % 1     Results from last 7 days   Lab Units 02/12/24  0953   POTASSIUM mmol/L 3.7   CHLORIDE mmol/L 104   CO2 mmol/L 25   BUN mg/dL 10   CREATININE mg/dL 0.46*   EGFR ml/min/1.73sq m 128     Results from last 7 days   Lab Units 02/12/24  0953   AST U/L 10*   ALT U/L 12   ALK PHOS U/L 46     Results from last 7 days   Lab Units 02/12/24  0953   PLATELETS Thousands/uL 252     Results from last 7 days   Lab Units 02/12/24  0926 02/08/24  0038 02/07/24  2335   POC GLUCOSE mg/dl 164* 120 116                       Fetal data:  Nonstress test: date 02/12/24   FHT: 160 bpm by " Doppler    [facility] ultrasound report key findings:   Nuchal Trans 1.50 mm   Betancur IUP  13 weeks and 3 days by this ultrasound. (VIBHA=AUG 2 2024)  13 weeks and 1 day by 1st Tri Sono. (VIBHA=AUG 4 2024)  Vertex presentation  Fetal growth appeared normal  Regular fetal heart rate of 163 bpm  Anterior placenta    Imaging, EKG, Pathology, and Other Studies: I have personally reviewed pertinent reports.          Jose Antonio Melvin MD  2/12/2024  10:46 AM

## 2024-02-12 NOTE — ASSESSMENT & PLAN NOTE
New diagnosis of T2DM  Admit to OB/GYN and consult MFM for blood sugar control     Lab Results   Component Value Date    HGBA1C 7.0 (H) 02/05/2024       Recent Labs     02/12/24  0926   POCGLU 164*       Blood Sugar Average: Last 72 hrs:  (P) 164

## 2024-02-13 VITALS
OXYGEN SATURATION: 98 % | WEIGHT: 222 LBS | SYSTOLIC BLOOD PRESSURE: 127 MMHG | RESPIRATION RATE: 18 BRPM | BODY MASS INDEX: 37.9 KG/M2 | HEART RATE: 88 BPM | TEMPERATURE: 98.3 F | HEIGHT: 64 IN | DIASTOLIC BLOOD PRESSURE: 73 MMHG

## 2024-02-13 PROBLEM — O99.810 ABNORMAL GLUCOSE AFFECTING PREGNANCY: Status: RESOLVED | Noted: 2024-01-31 | Resolved: 2024-02-13

## 2024-02-13 PROBLEM — O24.419 GESTATIONAL DIABETES MELLITUS (GDM) IN FIRST TRIMESTER: Status: RESOLVED | Noted: 2024-01-31 | Resolved: 2024-02-13

## 2024-02-13 PROBLEM — O09.522 MULTIGRAVIDA OF ADVANCED MATERNAL AGE IN SECOND TRIMESTER: Status: ACTIVE | Noted: 2024-01-29

## 2024-02-13 LAB
GLUCOSE SERPL-MCNC: 116 MG/DL (ref 65–140)
GLUCOSE SERPL-MCNC: 173 MG/DL (ref 65–140)
GLUCOSE SERPL-MCNC: 99 MG/DL (ref 65–140)

## 2024-02-13 PROCEDURE — 82948 REAGENT STRIP/BLOOD GLUCOSE: CPT

## 2024-02-13 PROCEDURE — NC001 PR NO CHARGE: Performed by: OBSTETRICS & GYNECOLOGY

## 2024-02-13 RX ORDER — INSULIN LISPRO 100 [IU]/ML
10 INJECTION, SOLUTION INTRAVENOUS; SUBCUTANEOUS
Qty: 15 ML | Refills: 0 | Status: SHIPPED | OUTPATIENT
Start: 2024-02-13

## 2024-02-13 RX ORDER — INSULIN GLARGINE 100 [IU]/ML
40 INJECTION, SOLUTION SUBCUTANEOUS
Qty: 15 ML | Refills: 0 | Status: SHIPPED | OUTPATIENT
Start: 2024-02-13

## 2024-02-13 RX ADMIN — INSULIN LISPRO 10 UNITS: 100 INJECTION, SOLUTION INTRAVENOUS; SUBCUTANEOUS at 09:04

## 2024-02-13 RX ADMIN — INSULIN LISPRO 10 UNITS: 100 INJECTION, SOLUTION INTRAVENOUS; SUBCUTANEOUS at 13:17

## 2024-02-13 NOTE — PROGRESS NOTES
"Progress Note - Maternal Fetal Medicine   Verito Mata 38 y.o. female MRN: [id number]  Unit/Bed#: -01 Encounter: [id number]  Admit date: 2024.  Today's date: 24    Assessment/Plan     Ms. Mata is a 38 y.o.  at 15w2d, Hospital Day: 2, admitted for glucose control after new diagnosis of T2DM.  By issue:    15 weeks gestation of pregnancy  Assessment & Plan  Prenatal labs wnl  NIPS low risk  Diabetic diet  Daily FHT    Multigravida of advanced maternal age in first trimester  Assessment & Plan  38 years of age  NIPS low risk    * Diabetes mellitus affecting pregnancy in second trimester  Assessment & Plan  Lab Results   Component Value Date    HGBA1C 7.0 (H) 2024       Recent Labs     24  0926 24  1617 24  0625   POCGLU 164* 106 119 99       Blood Sugar Average: Last 72 hrs:  (P) 122    Newly diagnosed   On Lantus 40 u nightly, added on Humalog 10u with every meal  Fasting and 2h post-prandial POCT  Diabetic diet                 Subjective    Contractions: no  Loss of fluid: no  Vaginal bleeding: no  Fetal movement: no    Pain: no  Headaches: no  Visual changes: no  Chest pain: no  Shortness of breath: no  Nausea: no  Vomiting/Diarrhea: no  Dysuria: no  Leg pain: no          Objective      Patient Vitals for the past 24 hrs:   BP Temp Temp src Pulse Resp SpO2 Height Weight   24 0017 104/66 97.8 °F (36.6 °C) Oral 87 16 98 % -- --   24 1620 119/73 98.3 °F (36.8 °C) Oral 97 16 97 % -- --   24 0913 117/69 -- -- 97 -- -- -- --   24 0857 -- 97.7 °F (36.5 °C) Oral (!) 109 18 -- 5' 4\" (1.626 m) 101 kg (222 lb)       Physical Exam  Constitutional:       Appearance: She is obese.   HENT:      Head: Normocephalic.   Eyes:      Conjunctiva/sclera: Conjunctivae normal.   Cardiovascular:      Rate and Rhythm: Normal rate.      Pulses: Normal pulses.   Pulmonary:      Effort: Pulmonary effort is normal.   Abdominal:      Palpations: " Abdomen is soft.      Tenderness: There is no abdominal tenderness.   Skin:     General: Skin is warm.   Neurological:      Mental Status: She is alert and oriented to person, place, and time.   Psychiatric:         Mood and Affect: Mood normal.         I/O       None            Invasive Devices       Peripheral Intravenous Line  Duration             Peripheral IV 24 Left;Ventral (anterior) Forearm <1 day                    Results from last 7 days   Lab Units 24  0953   WBC Thousand/uL 9.92   NEUTROS ABS Thousands/µL 7.03   HEMOGLOBIN g/dL 12.7   MCV fL 83   PLATELETS Thousands/uL 252     Results from last 7 days   Lab Units 24  0953   POTASSIUM mmol/L 3.7   CHLORIDE mmol/L 104   CO2 mmol/L 25   BUN mg/dL 10   CREATININE mg/dL 0.46*   EGFR ml/min/1.73sq m 128     Results from last 7 days   Lab Units 24  0953   AST U/L 10*   ALT U/L 12     Results from last 7 days   Lab Units 24  0953   PLATELETS Thousands/uL 252     Results from last 7 days   Lab Units 24  0625 24  2002 24  1617 24  0926 24  0038 24  2335   POC GLUCOSE mg/dl 99 119 106 164* 120 116                       Brief review of pertinent history:  Past Medical History:   Diagnosis Date    Anxiety     Depression     Ectopic pregnancy 2015    Miscarriage     , 2020 x 2    Type 2 diabetes mellitus affecting pregnancy in second trimester, antepartum      Past Surgical History:   Procedure Laterality Date    APPENDECTOMY      DIAGNOSTIC LAPAROSCOPY Left     ectopic- left (tube was cut, not removed per pt)    MULTIPLE TOOTH EXTRACTIONS  2006    all teeth removed     OB History    Para Term  AB Living   7 2 2 0 4 2   SAB IAB Ectopic Multiple Live Births   3 0 1 0 2      # Outcome Date GA Lbr Amaury/2nd Weight Sex Delivery Anes PTL Lv   7 Current            6 2020 10w0d    SAB      5 SAB 2020     SAB      4 SAB 2018     SAB      3 Ectopic 2015     ECTOPIC      2  Term 05/24/13 40w0d  3175 g (7 lb) F Vag-Spont EPI  TERRELL      Complications: Shoulder Dystocia   1 Term 05/01/06 40w0d  3175 g (7 lb) M Vag-Spont EPI  TERRELL       Fetal data:  Nonstress test: date 02/13/24   160s on arrival, to be done this morning    MEDS:   Medication Administration - last 24 hours from 02/12/2024 0659 to 02/13/2024 0659         Date/Time Order Dose Route Action Action by     02/12/2024 2148 EST insulin glargine (LANTUS) subcutaneous injection 40 Units 0.4 mL 40 Units Subcutaneous Given Katrin Temple RN     02/12/2024 1115 EST aspirin chewable tablet 162 mg 162 mg Oral Not Given Essie Tai RN     02/12/2024 1821 EST aspirin chewable tablet 162 mg 162 mg Oral Given Essie Tai RN     02/12/2024 1819 EST insulin lispro (HumaLOG) 100 units/mL subcutaneous injection 10 Units 10 Units Subcutaneous Given Essie Tai RN     02/12/2024 1357 EST insulin lispro (HumaLOG) 100 units/mL subcutaneous injection 10 Units 10 Units Subcutaneous Given Essie Tai RN     02/12/2024 1818 EST acetaminophen (TYLENOL) tablet 975 mg 975 mg Oral Not Given Essie Tai RN     02/12/2024 1630 EST acetaminophen (TYLENOL) tablet 975 mg 975 mg Oral Given Essie Tai RN          Current Facility-Administered Medications   Medication Dose Route Frequency    acetaminophen (TYLENOL) tablet 975 mg  975 mg Oral Q6H PRN    aspirin chewable tablet 162 mg  162 mg Oral Daily With Dinner    insulin glargine (LANTUS) subcutaneous injection 40 Units 0.4 mL  40 Units Subcutaneous HS    insulin lispro (HumaLOG) 100 units/mL subcutaneous injection 10 Units  10 Units Subcutaneous TID With Meals            Jose Antonio Melvin MD  OBGYN, PGY-3  2/13/2024  6:59 AM

## 2024-02-13 NOTE — UTILIZATION REVIEW
The patient did not deliver this admission  Admitted for medical/high risk maternity  Please re review for authorization       NOTIFICATION OF INPATIENT ADMISSION   [facility]   SERVICING FACILITY:   [facility] - L&D, , NICU  [address] [place] [address]  Tax ID: [id number]  NPI: [id number]   ATTENDING PROVIDER:  Attending Name and NPI#: Kathryn Burch Do [[id number]]  Address: [address] [place] [address]. [place] [address]  Phone: [phone]     ADMISSION INFORMATION:  Place of Service: Inpatient [facility]  Place of Service Code: 21  Inpatient Admission Date/Time: 24 10:11 AM  Discharge Date/Time: No discharge date for patient encounter.  Admitting Diagnosis Code/Description:  Encounter for supervision of normal pregnancy, unspecified, unspecified trimester [Z34.90] Unspecified diabetes mellitus in pregnancy second trimester [O24.912]     UTILIZATION REVIEW CONTACT:  Carmen Cain, Utilization   Network Utilization Review Department  Phone: [phone]  Fax [phone]  Email: [email or web address]  Contact for approvals/pending authorizations, clinical reviews, and discharge.     PHYSICIAN ADVISORY SERVICES:  Medical Necessity Denial & Lody-ef-Pbps Review  Phone: [phone]  Fax: [phone]  Email: [email or web address]     DISCHARGE SUPPORT TEAM:  For Patients Discharge Needs & Updates  Phone: [phone] opt. 2 Fax: [phone]  Email: [email or web address]

## 2024-02-13 NOTE — PLAN OF CARE
Problem: ANTEPARTUM  Goal: Maintain pregnancy as long as maternal and/or fetal condition is stable  Description: INTERVENTIONS:  - Maternal surveillance  - Fetal surveillance  - Monitor uterine activity  - Medications as ordered  - Bedrest  Outcome: Progressing     Problem: PAIN - ADULT  Goal: Verbalizes/displays adequate comfort level or baseline comfort level  Description: Interventions:  - Encourage patient to monitor pain and request assistance  - Assess pain using appropriate pain scale  - Administer analgesics based on type and severity of pain and evaluate response  - Implement non-pharmacological measures as appropriate and evaluate response  - Consider cultural and social influences on pain and pain management  - Notify physician/advanced practitioner if interventions unsuccessful or patient reports new pain  Outcome: Progressing     Problem: INFECTION - ADULT  Goal: Absence or prevention of progression during hospitalization  Description: INTERVENTIONS:  - Assess and monitor for signs and symptoms of infection  - Monitor lab/diagnostic results  - Monitor all insertion sites, i.e. indwelling lines, tubes, and drains  - Monitor endotracheal if appropriate and nasal secretions for changes in amount and color  - [place] appropriate cooling/warming therapies per order  - Administer medications as ordered  - Instruct and encourage patient and family to use good hand hygiene technique  - Identify and instruct in appropriate isolation precautions for identified infection/condition  Outcome: Progressing  Goal: Absence of fever/infection during neutropenic period  Description: INTERVENTIONS:  - Monitor WBC    Outcome: Progressing     Problem: SAFETY ADULT  Goal: Patient will remain free of falls  Description: INTERVENTIONS:  - Educate patient/family on patient safety including physical limitations  - Instruct patient to call for assistance with activity   - Consult OT/PT to assist with strengthening/mobility   -  Keep Call bell within reach  - Keep bed low and locked with side rails adjusted as appropriate  - Keep care items and personal belongings within reach  - Initiate and maintain comfort rounds  - Make Fall Risk Sign visible to staff  - Offer Toileting every  Hours, in advance of need  - Initiate/Maintain alarm  - Obtain necessary fall risk management equipment:   - Apply yellow socks and bracelet for high fall risk patients  - Consider moving patient to room near nurses station  Outcome: Progressing  Goal: Maintain or return to baseline ADL function  Description: INTERVENTIONS:  -  Assess patient's ability to carry out ADLs; assess patient's baseline for ADL function and identify physical deficits which impact ability to perform ADLs (bathing, care of mouth/teeth, toileting, grooming, dressing, etc.)  - Assess/evaluate cause of self-care deficits   - Assess range of motion  - Assess patient's mobility; develop plan if impaired  - Assess patient's need for assistive devices and provide as appropriate  - Encourage maximum independence but intervene and supervise when necessary  - Involve family in performance of ADLs  - Assess for home care needs following discharge   - Consider OT consult to assist with ADL evaluation and planning for discharge  - Provide patient education as appropriate  Outcome: Progressing  Goal: Maintains/Returns to pre admission functional level  Description: INTERVENTIONS:  - Perform AM-PAC 6 Click Basic Mobility/ Daily Activity assessment daily.  - Set and communicate daily mobility goal to care team and patient/family/caregiver.   - Collaborate with rehabilitation services on mobility goals if consulted  - Perform Range of Motion  times a day.  - Reposition patient every  hours.  - Dangle patient  times a day  - Stand patient  times a day  - Ambulate patient  times a day  - Out of bed to chair  times a day   - Out of bed for meals  times a day  - Out of bed for toileting  - Record patient  progress and toleration of activity level   Outcome: Progressing     Problem: Knowledge Deficit  Goal: Patient/family/caregiver demonstrates understanding of disease process, treatment plan, medications, and discharge instructions  Description: Complete learning assessment and assess knowledge base.  Interventions:  - Provide teaching at level of understanding  - Provide teaching via preferred learning methods  Outcome: Progressing     Problem: DISCHARGE PLANNING  Goal: Discharge to home or other facility with appropriate resources  Description: INTERVENTIONS:  - Identify barriers to discharge w/patient and caregiver  - Arrange for needed discharge resources and transportation as appropriate  - Identify discharge learning needs (meds, wound care, etc.)  - Arrange for interpretive services to assist at discharge as needed  - Refer to Case Management Department for coordinating discharge planning if the patient needs post-hospital services based on physician/advanced practitioner order or complex needs related to functional status, cognitive ability, or social support system  Outcome: Progressing

## 2024-02-13 NOTE — UTILIZATION REVIEW
Initial Clinical Review    Admission: Date/Time/Statement:   Admission Orders (From admission, onward)       Ordered        24 1011  Inpatient Admission  Once                          Orders Placed This Encounter   Procedures    Inpatient Admission     Standing Status:   Standing     Number of Occurrences:   1     Order Specific Question:   Level of Care     Answer:   Med Surg [16]     Order Specific Question:   Estimated length of stay     Answer:   More than 2 Midnights     Order Specific Question:   Certification     Answer:   I certify that inpatient services are medically necessary for this patient for a duration of greater than two midnights. See H&P and MD Progress Notes for additional information about the patient's course of treatment.       Chief Complaint   Patient presents with    Hyperglycemia       Initial Presentation: 38 y.o. female presented to L&D as inpatient admission for newly diagnosed T2DM and medication titration for blood glucose control.    @ 15 1/7 week gestation  admitted for medication titration for blood sugar control in the setting of newly diagnosed T2DM. She denies having uterine contractions, has no LOF, and reports no VB. She reports her fasting sugar was 157 bpm this morning. She reports she feels palpations sometimes when drinking caffeine so she doesn't drink caffeine anymore. She is following with [facility] and has been started on insulin. She has been uncomfortable with the process of injecting herself with insulin, and has been worried about hypoglycemia. [facility] recs for admission for inpatient BG monitoring/titration of insulin which she amenable to. Denies cramping leaking or bleeding  (+) fetal movement  Plan blood sugars FBS & 2 hr PP with SSI  On Lantus 40 u nightly, will add on Humalog 10u with every meal         Admitting Vitals   Temperature Pulse Respirations Blood Pressure SpO2   24 0857 24 0857 24 0857 24 0913 24 1620   97.7 °F  (36.5 °C) (!) 109 18 117/69 97 %      Temp Source Heart Rate Source Patient Position - Orthostatic VS BP Location FiO2 (%)   02/12/24 0857 02/12/24 0857 02/12/24 1620 02/12/24 1620 --   Oral Monitor Sitting Right arm       Pain Score       02/12/24 0857       No Pain          Wt Readings from Last 1 Encounters:   02/12/24 101 kg (222 lb)     Additional Vital Signs:   Date/Time Temp Pulse Resp BP SpO2 O2 Device Cardiac (WDL) Patient Position - Orthostatic VS   02/13/24 0837 98.3 °F (36.8 °C) 88 18 127/73 -- -- -- Sitting   02/13/24 0743 -- -- -- -- -- None (Room air) WDL --   02/13/24 0017 97.8 °F (36.6 °C) 87 16 104/66 98 % None (Room air) -- Lying   02/12/24 1945 -- -- -- -- -- None (Room air) WDL --   02/12/24 1620 98.3 °F (36.8 °C) 97 16 119/73 97 % None (Room air) -- Sitting   02/12/24 1200 -- -- -- -- -- -- WDL --   02/12/24 0913 -- 97 -- 117/69 -- -- -- --     Pertinent Labs/Diagnostic Test Results:   No orders to display         Results from last 7 days   Lab Units 02/12/24  0953   WBC Thousand/uL 9.92   HEMOGLOBIN g/dL 12.7   HEMATOCRIT % 37.5   PLATELETS Thousands/uL 252   NEUTROS ABS Thousands/µL 7.03         Results from last 7 days   Lab Units 02/12/24  0953   SODIUM mmol/L 135   POTASSIUM mmol/L 3.7   CHLORIDE mmol/L 104   CO2 mmol/L 25   ANION GAP mmol/L 6   BUN mg/dL 10   CREATININE mg/dL 0.46*   EGFR ml/min/1.73sq m 128   CALCIUM mg/dL 8.9     Results from last 7 days   Lab Units 02/12/24  0953   AST U/L 10*   ALT U/L 12   ALK PHOS U/L 46   TOTAL PROTEIN g/dL 6.3*   ALBUMIN g/dL 3.3*   TOTAL BILIRUBIN mg/dL 0.16*     Results from last 7 days   Lab Units 02/13/24  1518 02/13/24  1113 02/13/24  0625 02/12/24  2002 02/12/24  1617 02/12/24  0926 02/08/24  0038 02/07/24  2335   POC GLUCOSE mg/dl 116 173* 99 119 106 164* 120 116     Results from last 7 days   Lab Units 02/12/24  0953   GLUCOSE RANDOM mg/dL 160*     Past Medical History:   Diagnosis Date    Anxiety     Depression     Ectopic pregnancy 2015     Miscarriage     2018, 2020 x 2    Type 2 diabetes mellitus affecting pregnancy in second trimester, antepartum 2024     Present on Admission:   Multigravida of advanced maternal age in second trimester   Obesity complicating pregnancy, childbirth, or puerperium, antepartum   Diabetes mellitus affecting pregnancy in second trimester      Admitting Diagnosis: Encounter for supervision of normal pregnancy, unspecified, unspecified trimester [Z34.90]  Age/Sex: 38 y.o. female    Admission Orders:  Blood sugars FBS & 2 hr PP           Scheduled Medications:  aspirin, 162 mg, Oral, Daily With Dinner  insulin glargine, 40 Units, Subcutaneous, HS  insulin lispro, 10 Units, Subcutaneous, TID With Meals      Continuous IV Infusions:     PRN Meds:  acetaminophen, 975 mg, Oral, Q6H PRN        IP CONSULT TO PERINATOLOGY  IP CONSULT TO CASE MANAGEMENT  IP CONSULT TO CASE MANAGEMENT    Network Utilization Review Department  ATTENTION: Please call with any questions or concerns to [phone] and carefully listen to the prompts so that you are directed to the right person. All voicemails are confidential.   For Discharge needs, contact Care Management DC Support Team at [phone] opt. 2  Send all requests for admission clinical reviews, approved or denied determinations and any other requests to dedicated fax number below belonging to the campus where the patient is receiving treatment. List of dedicated fax numbers for the Facilities:  FACILITY NAME UR FAX NUMBER   ADMISSION DENIALS (Administrative/Medical Necessity) [phone]   DISCHARGE SUPPORT TEAM (NETWORK) [phone]   PARENT CHILD HEALTH (Maternity/NICU/Pediatrics) [phone]   [facility] [phone]   [facility] [phone]   [facility] [phone]   [facility] [phone]   [facility] [phone]   [facility] [phone]   [facility] [phone]   [facility] [phone]   [facility] [phone]   [facility] [phone]   [facility] [phone]   [facility] [phone]

## 2024-02-13 NOTE — CASE MANAGEMENT
"   Case Management Progress Note    Patient name Verito Mata  Location /-01 MRN [id number]  : 1985 Date 2024       LOS (days): 1  Geometric Mean LOS (GMLOS) (days):   Days to GMLOS:        OBJECTIVE:        Current admission status: Inpatient  Preferred Pharmacy:   [facility]/pharm[facility] #[id number] - [place], [place] - [address]  [address] [facility] [address] [address]  Phone: [phone] Fax: [phone]    [facility]pharm[facility] #[id number] - [place], [place] - [address]  [address] [address]  Phone: [phone] Fax: [phone]    [facility] ([place] [place] - [address]  [address] [place] [address]  Phone: [phone] Fax: [phone]    Primary Care Provider: No primary care provider on file.    Primary Insurance: [employer]  Secondary Insurance:     PROGRESS NOTE:    CM received consult due to patient unable to cover her prescription cost and no transportation home, she reported no money at this time due to her fiance being out of work.  She reported her sister helped her with her last insulin prescription.  Patient reported she does work full time at [employer] and her fiance will be returning to work, therefore this is a temporary financial strain. CM received approval from CM director to complete indigent form to cover prescription of Humalog for $45 and provide Lyft home.  Patient reported she can cover copay moving forward and she is aware coverage for transportation and prescription medication is a one time offer. CM made referral to [facility] for outpatient CM, she reported \"Unfortunately, I am unable to assign someone from our team for follow up. Patient does not appear to be connected to a PCP within the network and we do not presently offer coverage for the OB office she attends. Let us know if an appt. will be made for her to establish primary care prior to discharge and we'd be happy to re-review. \"  CM will provided uber for " patient to be discharged home.

## 2024-02-13 NOTE — PLAN OF CARE
Problem: ANTEPARTUM  Goal: Maintain pregnancy as long as maternal and/or fetal condition is stable  Description: INTERVENTIONS:  - Maternal surveillance  - Fetal surveillance  - Monitor uterine activity  - Medications as ordered  - Bedrest  Outcome: Progressing     Problem: PAIN - ADULT  Goal: Verbalizes/displays adequate comfort level or baseline comfort level  Description: Interventions:  - Encourage patient to monitor pain and request assistance  - Assess pain using appropriate pain scale  - Administer analgesics based on type and severity of pain and evaluate response  - Implement non-pharmacological measures as appropriate and evaluate response  - Consider cultural and social influences on pain and pain management  - Notify physician/advanced practitioner if interventions unsuccessful or patient reports new pain  Outcome: Progressing     Problem: INFECTION - ADULT  Goal: Absence or prevention of progression during hospitalization  Description: INTERVENTIONS:  - Assess and monitor for signs and symptoms of infection  - Monitor lab/diagnostic results  - Monitor all insertion sites, i.e. indwelling lines, tubes, and drains  - Monitor endotracheal if appropriate and nasal secretions for changes in amount and color  - [place] appropriate cooling/warming therapies per order  - Administer medications as ordered  - Instruct and encourage patient and family to use good hand hygiene technique  - Identify and instruct in appropriate isolation precautions for identified infection/condition  Outcome: Progressing  Goal: Absence of fever/infection during neutropenic period  Description: INTERVENTIONS:  - Monitor WBC    Outcome: Progressing     Problem: SAFETY ADULT  Goal: Patient will remain free of falls  Description: INTERVENTIONS:  - Educate patient/family on patient safety including physical limitations  - Instruct patient to call for assistance with activity   - Consult OT/PT to assist with strengthening/mobility   -  Keep Call bell within reach  - Keep bed low and locked with side rails adjusted as appropriate  - Keep care items and personal belongings within reach  - Initiate and maintain comfort rounds  - Make Fall Risk Sign visible to staff  - Apply yellow socks and bracelet for high fall risk patients  - Consider moving patient to room near nurses station  Outcome: Progressing  Goal: Maintain or return to baseline ADL function  Description: INTERVENTIONS:  -  Assess patient's ability to carry out ADLs; assess patient's baseline for ADL function and identify physical deficits which impact ability to perform ADLs (bathing, care of mouth/teeth, toileting, grooming, dressing, etc.)  - Assess/evaluate cause of self-care deficits   - Assess range of motion  - Assess patient's mobility; develop plan if impaired  - Assess patient's need for assistive devices and provide as appropriate  - Encourage maximum independence but intervene and supervise when necessary  - Involve family in performance of ADLs  - Assess for home care needs following discharge   - Consider OT consult to assist with ADL evaluation and planning for discharge  - Provide patient education as appropriate  Outcome: Progressing  Goal: Maintains/Returns to pre admission functional level  Description: INTERVENTIONS:  - Perform AM-PAC 6 Click Basic Mobility/ Daily Activity assessment daily.  - Set and communicate daily mobility goal to care team and patient/family/caregiver.   - Collaborate with rehabilitation services on mobility goals if consulted  - Out of bed for toileting  - Record patient progress and toleration of activity level   Outcome: Progressing     Problem: Knowledge Deficit  Goal: Patient/family/caregiver demonstrates understanding of disease process, treatment plan, medications, and discharge instructions  Description: Complete learning assessment and assess knowledge base.  Interventions:  - Provide teaching at level of understanding  - Provide teaching  via preferred learning methods  Outcome: Progressing     Problem: DISCHARGE PLANNING  Goal: Discharge to home or other facility with appropriate resources  Description: INTERVENTIONS:  - Identify barriers to discharge w/patient and caregiver  - Arrange for needed discharge resources and transportation as appropriate  - Identify discharge learning needs (meds, wound care, etc.)  - Arrange for interpretive services to assist at discharge as needed  - Refer to Case Management Department for coordinating discharge planning if the patient needs post-hospital services based on physician/advanced practitioner order or complex needs related to functional status, cognitive ability, or social support system  Outcome: Progressing

## 2024-02-14 ENCOUNTER — DOCUMENTATION (OUTPATIENT)
Facility: HOSPITAL | Age: 39
End: 2024-02-14

## 2024-02-14 DIAGNOSIS — O99.210 OBESITY AFFECTING PREGNANCY, ANTEPARTUM, UNSPECIFIED OBESITY TYPE: ICD-10-CM

## 2024-02-14 DIAGNOSIS — O24.912 DIABETES MELLITUS AFFECTING PREGNANCY IN SECOND TRIMESTER: Primary | ICD-10-CM

## 2024-02-14 DIAGNOSIS — Z3A.15 15 WEEKS GESTATION OF PREGNANCY: ICD-10-CM

## 2024-02-14 NOTE — ASSESSMENT & PLAN NOTE
-Continue Lantus 40 units daily.  -Due to 2 hours post prandials readings above 120; increase humalog to 14 units before meals 1-2-3. Hold if not eating.  -Continue SMBG and reporting via flowsheet.   Lab Results   Component Value Date    HGBA1C 7.0 (H) 02/05/2024

## 2024-02-14 NOTE — PROGRESS NOTES
Deenora 39 yo  15 3/7 weeks gestation GDM, possibly pre-gestational diabetes due to A1c 7% early pregnancy. Refer to glucose flowsheet for readings.  Problem List Items Addressed This Visit       15 weeks gestation of pregnancy    Obesity complicating pregnancy, childbirth, or puerperium, antepartum    Diabetes mellitus affecting pregnancy in second trimester - Primary     -Continue Lantus 40 units daily.  -Due to 2 hours post prandials readings above 120; increase humalog to 14 units before meals 1-2-3. Hold if not eating.  -Continue SMBG and reporting via flowsheet.   Lab Results   Component Value Date    HGBA1C 7.0 (H) 2024

## 2024-02-19 ENCOUNTER — ROUTINE PRENATAL (OUTPATIENT)
Dept: PERINATAL CARE | Facility: OTHER | Age: 39
End: 2024-02-19
Payer: COMMERCIAL

## 2024-02-19 VITALS
BODY MASS INDEX: 38.79 KG/M2 | HEIGHT: 64 IN | SYSTOLIC BLOOD PRESSURE: 126 MMHG | HEART RATE: 91 BPM | WEIGHT: 227.2 LBS | DIASTOLIC BLOOD PRESSURE: 74 MMHG

## 2024-02-19 DIAGNOSIS — Z3A.16 16 WEEKS GESTATION OF PREGNANCY: ICD-10-CM

## 2024-02-19 DIAGNOSIS — Z36.3 ENCOUNTER FOR ANTENATAL SCREENING FOR MALFORMATION: ICD-10-CM

## 2024-02-19 DIAGNOSIS — O09.522 MULTIGRAVIDA OF ADVANCED MATERNAL AGE IN SECOND TRIMESTER: ICD-10-CM

## 2024-02-19 DIAGNOSIS — O99.210 OBESITY AFFECTING PREGNANCY, ANTEPARTUM, UNSPECIFIED OBESITY TYPE: ICD-10-CM

## 2024-02-19 DIAGNOSIS — O24.912 DIABETES MELLITUS AFFECTING PREGNANCY IN SECOND TRIMESTER: Primary | ICD-10-CM

## 2024-02-19 PROCEDURE — 76811 OB US DETAILED SNGL FETUS: CPT | Performed by: STUDENT IN AN ORGANIZED HEALTH CARE EDUCATION/TRAINING PROGRAM

## 2024-02-19 PROCEDURE — 99214 OFFICE O/P EST MOD 30 MIN: CPT | Performed by: STUDENT IN AN ORGANIZED HEALTH CARE EDUCATION/TRAINING PROGRAM

## 2024-02-19 NOTE — PROGRESS NOTES
"[facility]: Ms. Mata was seen today for anatomic survey ultrasound.  See ultrasound report under \"OB Procedures\" tab.      MDM:   I. Diagnoses/Problems addressed:  Type 2 diabetes  II.  Data:  glucola, glucose log, A1c  III.  Risk of morbidity: Low    Please don't hesitate to contact our office with any concerns or questions.  -Silvia Garcia MD    "

## 2024-02-20 ENCOUNTER — TELEPHONE (OUTPATIENT)
Dept: OBGYN CLINIC | Facility: MEDICAL CENTER | Age: 39
End: 2024-02-20

## 2024-02-23 NOTE — UTILIZATION REVIEW
The patient did not deliver this admission  Admitted for medical/high risk maternity- 15 wks  Please re review for authorization       NOTIFICATION OF INPATIENT ADMISSION   HIGHRISK MATERNITY AUTHORIZATION REQUEST   SERVICING FACILITY:   [facility] - L&D, , NICU  [address] [place] [address]  Tax ID: [id number]  NPI: [id number]   ATTENDING PROVIDER:  Attending Name and NPI#: Kathryn Burch Do [[id number]]  Address: [address] [place] [address]  Phone: [phone]     ADMISSION INFORMATION:  Place of Service: Inpatient [facility]  Place of Service Code: 21  Inpatient Admission Date/Time: 24 10:11 AM  Discharge Date/Time: 2024  4:15 PM  Admitting Diagnosis Code/Description:  Encounter for supervision of normal pregnancy, unspecified, unspecified trimester [Z34.90] Unspecified diabetes mellitus in pregnancy second trimester [O24.912]     UTILIZATION REVIEW CONTACT:  Carmen Cain, Utilization   Network Utilization Review Department  Phone: [phone]  Fax [phone]  Email: [email or web address]  Contact for approvals/pending authorizations, clinical reviews, and discharge.     PHYSICIAN ADVISORY SERVICES:  Medical Necessity Denial & Evpl-if-Qcpb Review  Phone: [phone]  Fax: [phone]  Email: [email or web address]     DISCHARGE SUPPORT TEAM:  For Patients Discharge Needs & Updates  Phone: [phone] opt. 2 Fax: [phone]  Email: [email or web address]    Initial Clinical Review    Admission: Date/Time/Statement:   Admission Orders (From admission, onward)       Ordered        24 1011  Inpatient Admission  Once                          Orders Placed This Encounter   Procedures    Inpatient Admission     Standing Status:   Standing     Number of Occurrences:   1     Order Specific Question:   Level of Care     Answer:   Med Surg [16]     Order Specific Question:    Estimated length of stay     Answer:   More than 2 Midnights     Order Specific Question:   Certification     Answer:   I certify that inpatient services are medically necessary for this patient for a duration of greater than two midnights. See H&P and MD Progress Notes for additional information about the patient's course of treatment.       Chief Complaint   Patient presents with    Hyperglycemia       Initial Presentation: 38 y.o. female presented to L&D as inpatient admission for newly diagnosed T2DM and medication titration for blood glucose control.    @ 15 1/7 week gestation  admitted for medication titration for blood sugar control in the setting of newly diagnosed T2DM. She denies having uterine contractions, has no LOF, and reports no VB. She reports her fasting sugar was 157 bpm this morning. She reports she feels palpations sometimes when drinking caffeine so she doesn't drink caffeine anymore. She is following with [facility] and has been started on insulin. She has been uncomfortable with the process of injecting herself with insulin, and has been worried about hypoglycemia. [facility] recs for admission for inpatient BG monitoring/titration of insulin which she amenable to. Denies cramping leaking or bleeding  (+) fetal movement  Plan blood sugars FBS & 2 hr PP with SSI  On Lantus 40 u nightly, will add on Humalog 10u with every meal         Admitting Vitals   Temperature Pulse Respirations Blood Pressure SpO2   24 0857 24 0857 24 0857 24 0913 24 1620   97.7 °F (36.5 °C) (!) 109 18 117/69 97 %      Temp Source Heart Rate Source Patient Position - Orthostatic VS BP Location FiO2 (%)   24 0857 24 0857 24 1620 24 1620 --   Oral Monitor Sitting Right arm       Pain Score       24 0857       No Pain          Wt Readings from Last 1 Encounters:   24 103 kg (227 lb 3.2 oz)     Additional Vital Signs:   Date/Time Temp Pulse Resp BP SpO2 O2 Device  Cardiac (WDL) Patient Position - Orthostatic VS   02/13/24 0837 98.3 °F (36.8 °C) 88 18 127/73 -- -- -- Sitting   02/13/24 0743 -- -- -- -- -- None (Room air) WDL --   02/13/24 0017 97.8 °F (36.6 °C) 87 16 104/66 98 % None (Room air) -- Lying   02/12/24 1945 -- -- -- -- -- None (Room air) WDL --   02/12/24 1620 98.3 °F (36.8 °C) 97 16 119/73 97 % None (Room air) -- Sitting   02/12/24 1200 -- -- -- -- -- -- WDL --   02/12/24 0913 -- 97 -- 117/69 -- -- -- --     Pertinent Labs/Diagnostic Test Results:   No orders to display                                           Past Medical History:   Diagnosis Date    Anxiety     Depression     Ectopic pregnancy 2015    Miscarriage     2018, 2020 x 2    Type 2 diabetes mellitus affecting pregnancy in second trimester, antepartum 2024     Present on Admission:   Multigravida of advanced maternal age in second trimester   Obesity complicating pregnancy, childbirth, or puerperium, antepartum   Diabetes mellitus affecting pregnancy in second trimester      Admitting Diagnosis: Encounter for supervision of normal pregnancy, unspecified, unspecified trimester [Z34.90]  Age/Sex: 38 y.o. female    Admission Orders:  Blood sugars FBS & 2 hr PP           Scheduled Medications:  No current facility-administered medications for this encounter.    Continuous IV Infusions:  No current facility-administered medications for this encounter.    PRN Meds:  No current facility-administered medications for this encounter.      IP CONSULT TO PERINATOLOGY  IP CONSULT TO CASE MANAGEMENT  IP CONSULT TO CASE MANAGEMENT    Network Utilization Review Department  ATTENTION: Please call with any questions or concerns to [phone] and carefully listen to the prompts so that you are directed to the right person. All voicemails are confidential.   For Discharge needs, contact Care Management DC Support Team at [phone] opt. 2  Send all requests for admission clinical reviews, approved or denied  determinations and any other requests to dedicated fax number below belonging to the campus where the patient is receiving treatment. List of dedicated fax numbers for the Facilities:  FACILITY NAME UR FAX NUMBER   ADMISSION DENIALS (Administrative/Medical Necessity) [phone]   DISCHARGE SUPPORT TEAM ([facility]) [phone]   PARENT CHILD HEALTH (Maternity/NICU/Pediatrics) [phone]   [facility] [phone]   [facility] [phone]   [facility] [phone]   [facility] [phone]   [facility] [phone]   [facility] [phone]   [facility] [phone]   [facility] [phone]   [facility] [phone]   [facility] [phone]   General acute hospital [phone]   [facility] [phone]           NOTIFICATION OF ADMISSION DISCHARGE   This is a Notification of Discharge from [facility]. Please be advised that this patient has been discharge from our facility. Below you will find the admission and discharge date and time including the patient’s disposition.   UTILIZATION REVIEW CONTACT:  Carmen Cain  Utilization   Network Utilization Review Department  Phone: [phone] x carefully listen to the prompts. All voicemails are confidential.  Email: [email or web address]     ADMISSION INFORMATION  PRESENTATION DATE: 2/12/2024  8:32 AM  OBERVATION ADMISSION DATE  INPATIENT ADMISSION DATE: 2/12/24 10:11 AM   DISCHARGE DATE: 2/13/2024  4:15 PM   DISPOSITION:Home/Self Care    Network Utilization Review Department  ATTENTION: Please call with any questions or concerns to [phone] and carefully listen to the prompts so  that you are directed to the right person. All voicemails are confidential.   For Discharge needs, contact Care Management DC Support Team at [phone] opt. 2  Send all requests for admission clinical reviews, approved or denied determinations and any other requests to dedicated fax number below belonging to the campus where the patient is receiving treatment. List of dedicated fax numbers for the Facilities:  FACILITY NAME UR FAX NUMBER   ADMISSION DENIALS (Administrative/Medical Necessity) [phone]   DISCHARGE SUPPORT TEAM (NETWORK) [phone]   PARENT CHILD HEALTH (Maternity/NICU/Pediatrics) [phone]   [facility] [phone]   [facility] [phone]   [facility] [phone]   [facility] [phone]   [facility] [phone]   [facility] [phone]   [facility] [phone]   [facility] [phone]   [facility] [phone]   [facility] [phone]   [facility] [phone]   [facility] [phone]

## 2024-02-23 NOTE — UTILIZATION REVIEW
NOTIFICATION OF ADMISSION DISCHARGE   This is a Notification of Discharge from [facility]. Please be advised that this patient has been discharge from our facility. Below you will find the admission and discharge date and time including the patient’s disposition.   UTILIZATION REVIEW CONTACT:  Carmen Cain  Utilization   Network Utilization Review Department  Phone: [phone] x carefully listen to the prompts. All voicemails are confidential.  Email: [email or web address]     ADMISSION INFORMATION  PRESENTATION DATE: 2/12/2024  8:32 AM  OBERVATION ADMISSION DATE  INPATIENT ADMISSION DATE: 2/12/24 10:11 AM   DISCHARGE DATE: 2/13/2024  4:15 PM   DISPOSITION:Home/Self Care    Network Utilization Review Department  ATTENTION: Please call with any questions or concerns to [phone] and carefully listen to the prompts so that you are directed to the right person. All voicemails are confidential.   For Discharge needs, contact Care Management DC Support Team at [phone] opt. 2  Send all requests for admission clinical reviews, approved or denied determinations and any other requests to dedicated fax number below belonging to the campus where the patient is receiving treatment. List of dedicated fax numbers for the Facilities:  FACILITY NAME UR FAX NUMBER   ADMISSION DENIALS (Administrative/Medical Necessity) [phone]   DISCHARGE SUPPORT TEAM ([facility]) [phone]   PARENT CHILD HEALTH (Maternity/NICU/Pediatrics) [phone]   [facility] [phone]   [facility] [phone]   [facility] [phone]   [facility] [phone]   [facility] [phone]   [facility] [phone]   [facility] [phone]   [facility] [phone]   [facility]  [facility] [phone]   formerly [facility] [phone]   [facility] [phone]   [facility] [phone]

## 2024-03-07 DIAGNOSIS — Z3A.14 14 WEEKS GESTATION OF PREGNANCY: ICD-10-CM

## 2024-03-07 DIAGNOSIS — O99.810 ABNORMAL GLUCOSE AFFECTING PREGNANCY: ICD-10-CM

## 2024-03-07 DIAGNOSIS — Z3A.15 15 WEEKS GESTATION OF PREGNANCY: ICD-10-CM

## 2024-03-07 DIAGNOSIS — O24.419 GESTATIONAL DIABETES MELLITUS (GDM) IN SECOND TRIMESTER, GESTATIONAL DIABETES METHOD OF CONTROL UNSPECIFIED: ICD-10-CM

## 2024-03-07 DIAGNOSIS — O24.911 DIABETES MELLITUS AFFECTING PREGNANCY, FIRST TRIMESTER: ICD-10-CM

## 2024-03-07 RX ORDER — INSULIN GLARGINE 100 [IU]/ML
40 INJECTION, SOLUTION SUBCUTANEOUS
Qty: 15 ML | Refills: 0 | Status: SHIPPED | OUTPATIENT
Start: 2024-03-07

## 2024-03-07 RX ORDER — INSULIN LISPRO 100 [IU]/ML
10 INJECTION, SOLUTION INTRAVENOUS; SUBCUTANEOUS
Qty: 15 ML | Refills: 1 | Status: SHIPPED | OUTPATIENT
Start: 2024-03-07

## 2024-03-12 ENCOUNTER — NURSE TRIAGE (OUTPATIENT)
Age: 39
End: 2024-03-12

## 2024-03-12 NOTE — TELEPHONE ENCOUNTER
Regarding: numbnes, pain in pregnancy  ----- Message from Farhana Emerson sent at 3/12/2024 11:42 AM EDT -----  Patient called saying she left a message with her doctor about having extremely painful numbness and pins and needles feeling in both hands and arms at night which was messing with her sleep. Now she is having it throughout the whole day that it is affecting her daily life. Patient requests call back.

## 2024-03-12 NOTE — TELEPHONE ENCOUNTER
"Spoke with pt regarding symptoms. She reports bilateral hand/wrist pain, numbness and pins/needles feeling. Initially was only at night but now it is lingering into the day. Symptoms started about one month ago. Tried adjusting position in sleep. Took tylenol with no relief. Reviewed she can try tylenol again as well as wrist braces.       Answer Assessment - Initial Assessment Questions  1. ONSET: \"When did the pain start?\"      About one month ago  2. LOCATION: \"Where is the pain located?\"      Bilateral hands/wrist  3. PAIN: \"How bad is the pain?\" (Scale 1-10; or mild, moderate, severe)    - MILD (1-3): doesn't interfere with normal activities    - MODERATE (4-7): interferes with normal activities (e.g., work or school) or awakens from sleep    - SEVERE (8-10): excruciating pain, unable to use hand at all      Moderate, started off just at night but now lingering throughout the day  5. CAUSE: \"What do you think is causing the pain?\"      unsure  6. AGGRAVATING FACTORS: \"What makes the pain worse?\" (e.g., using computer)      movement  7. OTHER SYMPTOMS: \"Do you have any other symptoms?\" (e.g., neck pain, swelling, rash, numbness, fever)      denies  8. PREGNANCY: \"Is there any chance you are pregnant?\" \"When was your last menstrual period?\"      19w2d    Protocols used: Hand and Wrist Pain-ADULT-[place]    "

## 2024-03-18 ENCOUNTER — ROUTINE PRENATAL (OUTPATIENT)
Dept: PERINATAL CARE | Facility: OTHER | Age: 39
End: 2024-03-18
Payer: COMMERCIAL

## 2024-03-18 VITALS
DIASTOLIC BLOOD PRESSURE: 76 MMHG | SYSTOLIC BLOOD PRESSURE: 124 MMHG | WEIGHT: 235.2 LBS | BODY MASS INDEX: 40.15 KG/M2 | HEART RATE: 102 BPM | HEIGHT: 64 IN

## 2024-03-18 DIAGNOSIS — Z36.3 ENCOUNTER FOR ANTENATAL SCREENING FOR MALFORMATIONS: ICD-10-CM

## 2024-03-18 DIAGNOSIS — O24.912 DIABETES MELLITUS AFFECTING PREGNANCY IN SECOND TRIMESTER: Primary | ICD-10-CM

## 2024-03-18 DIAGNOSIS — Z36.86 ENCOUNTER FOR ANTENATAL SCREENING FOR CERVICAL LENGTH: ICD-10-CM

## 2024-03-18 PROCEDURE — 99214 OFFICE O/P EST MOD 30 MIN: CPT | Performed by: OBSTETRICS & GYNECOLOGY

## 2024-03-18 PROCEDURE — 76817 TRANSVAGINAL US OBSTETRIC: CPT | Performed by: OBSTETRICS & GYNECOLOGY

## 2024-03-18 PROCEDURE — 76811 OB US DETAILED SNGL FETUS: CPT | Performed by: OBSTETRICS & GYNECOLOGY

## 2024-03-18 NOTE — PROGRESS NOTES
Ultrasound Probe Disinfection    A transvaginal ultrasound was performed.   Prior to use, disinfection was performed with High Level Disinfection Process (Quizrr).  Probe serial number M2: [id number] was used.      Ayana Thibodeaux  03/18/24  8:09 AM

## 2024-03-18 NOTE — LETTER
"   Date: 3/18/2024    Nisha Peralta DO  [address] [place] [address]  Dannie SARKAR [address]    Patient: Verito Mata   YOB: 1985   Date of Visit: 3/18/2024   Gestational age 20w1d   Nature of this communication: Priority: she needs MSAFP ordered and has not seen anyone from [facility] in 6 weeks and has no followup scheduled!       Dear Colleagues,    This patient was seen recently in our  office.  Please see ultrasound report under \"OB Procedures\" tab.  Please don't hesitate to contact our office with any concerns or questions.      Sincerely,      Sonal Mcbride MD  Attending Physician, Maternal-Fetal Medicine  [facility]      "

## 2024-03-18 NOTE — PROGRESS NOTES
"[facility]: Ms. Mata was seen today for anatomic survey and cervical length screening ultrasound.  See ultrasound report under \"OB Procedures\" tab.   The time spent on this established patient on the encounter date included 5 minutes previsit service time reviewing records and precharting, 10 minutes face-to-face service time counseling regarding results and coordinating care, and  5 minutes charting, totalling 20 minutes.  Please don't hesitate to contact our office with any concerns or questions.  -Sonal Mcbride MD      "

## 2024-03-26 PROBLEM — Z3A.21 21 WEEKS GESTATION OF PREGNANCY: Status: ACTIVE | Noted: 2023-12-26

## 2024-04-02 ENCOUNTER — TELEPHONE (OUTPATIENT)
Facility: HOSPITAL | Age: 39
End: 2024-04-02

## 2024-04-02 NOTE — TELEPHONE ENCOUNTER
Voice message left regarding our appointment for today and to contact the office. Last time Verito reported glucose readings via March on 3/16/2024 on basal/bolus insulin. Last noted doses Lantus 48 units daily and Humalog 16-14-14 before meals 1-2-3. Pending return phone call or connection to virtual visit.

## 2024-04-05 ENCOUNTER — TELEPHONE (OUTPATIENT)
Dept: PERINATAL CARE | Facility: CLINIC | Age: 39
End: 2024-04-05

## 2024-04-05 NOTE — TELEPHONE ENCOUNTER
Reminder to Report Blood Sugars    I called Verito by [employer] teams phone.     The reason for my call was: Reminder to Report Blood Sugars (Last Reported BG: 3/16/24 via [employer] Glucose Flowsheet ), Diabetes Type 2 (Currently Pregnant; 22w5d), Medication Management (Taking Lantus once daily at bedtime + Humalog with B/L/D), and Appointment (Reschedule missed follow-up appointment with Jessica Sandy on 4/2/24)    Call Outcome: No Answer, Left Voicemail , Advised to call #[phone] for questions or difficulty reporting blood sugars, Sent [employer] message, Encouraged to send a [employer] message once blood sugars are uploaded so blood sugars can be reviewed as soon as possible, Instructed to call [phone] to schedule follow-up appointment with Jessica Sandy.    Current Blood Sugar Log:        Lorene Mazariegos RD MS  Diabetes Educator   [facility]  Diabetes and Pregnancy Program

## 2024-04-09 ENCOUNTER — ROUTINE PRENATAL (OUTPATIENT)
Dept: PERINATAL CARE | Facility: OTHER | Age: 39
End: 2024-04-09
Payer: COMMERCIAL

## 2024-04-09 ENCOUNTER — TELEPHONE (OUTPATIENT)
Dept: OBGYN CLINIC | Facility: CLINIC | Age: 39
End: 2024-04-09

## 2024-04-09 VITALS
DIASTOLIC BLOOD PRESSURE: 70 MMHG | WEIGHT: 234 LBS | HEIGHT: 64 IN | BODY MASS INDEX: 39.95 KG/M2 | SYSTOLIC BLOOD PRESSURE: 126 MMHG | HEART RATE: 101 BPM

## 2024-04-09 DIAGNOSIS — Z3A.23 23 WEEKS GESTATION OF PREGNANCY: ICD-10-CM

## 2024-04-09 DIAGNOSIS — O35.BXX0 FETAL CARDIAC ANOMALY COMPLICATING PREGNANCY, ANTEPARTUM, SINGLE GESTATION: Primary | ICD-10-CM

## 2024-04-09 PROCEDURE — 76825 ECHO EXAM OF FETAL HEART: CPT | Performed by: OBSTETRICS & GYNECOLOGY

## 2024-04-09 PROCEDURE — 93325 DOPPLER ECHO COLOR FLOW MAPG: CPT | Performed by: OBSTETRICS & GYNECOLOGY

## 2024-04-09 PROCEDURE — 99214 OFFICE O/P EST MOD 30 MIN: CPT | Performed by: OBSTETRICS & GYNECOLOGY

## 2024-04-09 PROCEDURE — 76827 ECHO EXAM OF FETAL HEART: CPT | Performed by: OBSTETRICS & GYNECOLOGY

## 2024-04-09 NOTE — LETTER
April 9, 2024     Stan Redman MD  [address]  [address]st [address]  [place] [address]    Patient: Verito Mata   YOB: 1985   Date of Visit: 4/9/2024       Dear Dr. Redman:    Thank you for referring Verito Mata to me for evaluation. Below are my notes for this consultation.    If you have questions, please do not hesitate to call me. I look forward to following your patient along with you.     Pulmonary Valve z score consisently greater than 2.     Sincerely,        Ton Mandel MD        CC: No Recipients    Ton Mandel MD  4/9/2024  1:11 PM  Sign when Signing Visit  The patient was seen today for an ultrasound.  Please see ultrasound report (located under Ob Procedures) for additional details.   Thank you very much for allowing us to participate in the care of this very nice patient.  Should you have any questions, please do not hesitate to contact me.     Ton Mandel MD FACOG  Attending Physician, Maternal-Fetal Medicine  [facility]

## 2024-04-09 NOTE — PROGRESS NOTES
The patient was seen today for an ultrasound.  Please see ultrasound report (located under Ob Procedures) for additional details.   Thank you very much for allowing us to participate in the care of this very nice patient.  Should you have any questions, please do not hesitate to contact me.     Ton Mandel MD FACOG  Attending Physician, Maternal-Fetal Medicine  [facility]

## 2024-04-10 ENCOUNTER — TELEPHONE (OUTPATIENT)
Dept: OBGYN CLINIC | Facility: CLINIC | Age: 39
End: 2024-04-10

## 2024-04-10 NOTE — TELEPHONE ENCOUNTER
----- Message from Judy Monsalve PA-C sent at 4/9/2024  2:17 PM EDT -----  Regarding: FW: Reminder to Report Blood Sugars + Insulin Regimen (Last Reported BG: 3/16/24)  OB Navigators    See message from [facility] below. Pt just no showed for her PN1 visit with me and is not complete blood glucose readings. Please try to call her to see if there are any barriers preventing her from coming in for care  ----- Message -----  From: Lorene Mazariegos RD  Sent: 4/8/2024   8:20 AM EDT  To: oTn Mandel MD; Judy Monsalve PA-C  Subject: FW: Reminder to Report Blood Sugars + Insuli#    T2DM. Not reporting BG. Please discuss reporting blood sugars at upcoming appointments. Thank you!    Upcoming Appts:   (4/9/24) US at [facility] w/ Dr. Mandel  (4/9/24) OB appt w/ Judy Mazariegos RD  Diabetes Educator   [facility]  Diabetes and Pregnancy Program

## 2024-04-11 PROBLEM — O99.210 MATERNAL OBESITY, ANTEPARTUM: Status: ACTIVE | Noted: 2024-03-26

## 2024-04-12 ENCOUNTER — INITIAL PRENATAL (OUTPATIENT)
Dept: OBGYN CLINIC | Facility: MEDICAL CENTER | Age: 39
End: 2024-04-12

## 2024-04-12 VITALS
BODY MASS INDEX: 40.12 KG/M2 | WEIGHT: 235 LBS | HEIGHT: 64 IN | HEART RATE: 100 BPM | SYSTOLIC BLOOD PRESSURE: 116 MMHG | DIASTOLIC BLOOD PRESSURE: 68 MMHG

## 2024-04-12 DIAGNOSIS — E66.01 SEVERE OBESITY DUE TO EXCESS CALORIES AFFECTING PREGNANCY, ANTEPARTUM (HCC): ICD-10-CM

## 2024-04-12 DIAGNOSIS — O35.BXX0 FETAL CARDIAC ANOMALY COMPLICATING PREGNANCY, ANTEPARTUM, SINGLE GESTATION: Primary | ICD-10-CM

## 2024-04-12 DIAGNOSIS — O24.911 DIABETES MELLITUS AFFECTING PREGNANCY, FIRST TRIMESTER: ICD-10-CM

## 2024-04-12 DIAGNOSIS — O24.419 GESTATIONAL DIABETES MELLITUS (GDM) IN SECOND TRIMESTER, GESTATIONAL DIABETES METHOD OF CONTROL UNSPECIFIED: ICD-10-CM

## 2024-04-12 DIAGNOSIS — Z3A.15 15 WEEKS GESTATION OF PREGNANCY: ICD-10-CM

## 2024-04-12 DIAGNOSIS — O24.419 GESTATIONAL DIABETES MELLITUS (GDM) IN FIRST TRIMESTER, GESTATIONAL DIABETES METHOD OF CONTROL UNSPECIFIED: ICD-10-CM

## 2024-04-12 DIAGNOSIS — Z34.82 PRENATAL CARE, SUBSEQUENT PREGNANCY, SECOND TRIMESTER: ICD-10-CM

## 2024-04-12 DIAGNOSIS — O99.210 SEVERE OBESITY DUE TO EXCESS CALORIES AFFECTING PREGNANCY, ANTEPARTUM (HCC): ICD-10-CM

## 2024-04-12 DIAGNOSIS — O99.810 ABNORMAL GLUCOSE AFFECTING PREGNANCY: ICD-10-CM

## 2024-04-12 DIAGNOSIS — Z3A.13 13 WEEKS GESTATION OF PREGNANCY: ICD-10-CM

## 2024-04-12 DIAGNOSIS — Z3A.23 23 WEEKS GESTATION OF PREGNANCY: ICD-10-CM

## 2024-04-12 DIAGNOSIS — O24.912 DIABETES MELLITUS AFFECTING PREGNANCY IN SECOND TRIMESTER: ICD-10-CM

## 2024-04-12 DIAGNOSIS — Z3A.14 14 WEEKS GESTATION OF PREGNANCY: ICD-10-CM

## 2024-04-12 DIAGNOSIS — F12.10 TETRAHYDROCANNABINOL (THC) USE DISORDER, MILD, ABUSE: ICD-10-CM

## 2024-04-12 PROBLEM — O26.859 SPOTTING IN PREGNANCY: Status: RESOLVED | Noted: 2024-02-05 | Resolved: 2024-04-12

## 2024-04-12 LAB
SL AMB  POCT GLUCOSE, UA: ABNORMAL
SL AMB POCT URINE PROTEIN: ABNORMAL

## 2024-04-12 RX ORDER — INSULIN GLARGINE 100 [IU]/ML
48 INJECTION, SOLUTION SUBCUTANEOUS
Qty: 15 ML | Refills: 3 | Status: SHIPPED | OUTPATIENT
Start: 2024-04-12

## 2024-04-12 RX ORDER — INSULIN LISPRO 100 [IU]/ML
14 INJECTION, SOLUTION INTRAVENOUS; SUBCUTANEOUS
Qty: 15 ML | Refills: 1 | Status: SHIPPED | OUTPATIENT
Start: 2024-04-12

## 2024-04-12 RX ORDER — LANCETS
EACH MISCELLANEOUS
Qty: 100 EACH | Refills: 7 | Status: SHIPPED | OUTPATIENT
Start: 2024-04-12

## 2024-04-12 NOTE — ASSESSMENT & PLAN NOTE
-4/9/24 23.2wks fetal echo increased Z-score of the pulmonary  artery without evidence for obvious abnormalities other than a larger  pulmonary artery.  -fetal echo with peds cardio scheduled 4/30/24

## 2024-04-12 NOTE — PROGRESS NOTES
23 weeks gestation of pregnancy  39yo  at 23.5wks presents for initial prenatal visit     Pt denies contractions, vaginal bleeding, leakage of fluid. Endorses fetal movement.    -continue PNVs   -prenatal labs reviewed   - labor precautions provided   -follow up in 3 weeks     Obesity complicating pregnancy, childbirth, or puerperium, antepartum  -continue ASA     Tetrahydrocannabinol (THC) use disorder, mild, abuse  -denies use since December after finding out she was pregnant    Diabetes mellitus affecting pregnancy in second trimester  -diagnosed with pregestational diabetes based on early 1h glucola of 285  -admitted to antepartum at [place] at 15wks for blood glucose control, currently follows with MFM and diabetic education   -reports fasting monitoring at home 100-140, post prandial . Pt reports she is not logging her glucose measurements or sending them to MFM. Encouraged her to do so.   -encouraged diabetic diet, has follow up appointment on 24  Lab Results   Component Value Date    HGBA1C 7.0 (H) 2024       Fetal cardiac anomaly complicating pregnancy, antepartum, single gestation  -24 23.2wks fetal echo increased Z-score of the pulmonary  artery without evidence for obvious abnormalities other than a larger  pulmonary artery.  -fetal echo with peds cardio scheduled 24

## 2024-04-12 NOTE — ASSESSMENT & PLAN NOTE
37yo  at 23.5wks presents for initial prenatal visit     Pt denies contractions, vaginal bleeding, leakage of fluid. Endorses fetal movement.    -continue PNVs   -prenatal labs reviewed   - labor precautions provided   -follow up in 3 weeks

## 2024-04-12 NOTE — ASSESSMENT & PLAN NOTE
-diagnosed with pregestational diabetes based on early 1h glucola of 285  -admitted to antepartum at [place] at 15wks for blood glucose control, currently follows with MFM and diabetic education   -reports fasting monitoring at home 100-140, post prandial . Pt reports she is not logging her glucose measurements or sending them to MFM. Encouraged her to do so.   -encouraged diabetic diet, has follow up appointment on 4/16/24  Lab Results   Component Value Date    HGBA1C 7.0 (H) 02/05/2024

## 2024-04-15 ENCOUNTER — NURSE TRIAGE (OUTPATIENT)
Age: 39
End: 2024-04-15

## 2024-04-15 NOTE — TELEPHONE ENCOUNTER
"Patient 24w1d called with c/o noticing nonpitting edema in b/l feet since Sunday afternoon. Denies pain, redness. Patient states she has had b/l hand swelling for a few weeks. Denies visual changes, RUQ pain, HA. Admits to occasional HA's throughout pregnancy relieved with Tylenol. BP at home 112/89 as per patient. Denies ctx, LOF, VB +FM. Advised patient to elevate legs throughout the day, increase water intake to a minimum of 64 ounces per day, wear compression stockings during the day and remove to sleep. If swelling becomes worse, red or painful to touch, HA's unrelieved with Tylenol, visual changes, RUQ pain, decreased fetal movement, LOF, VB, abdominal pain to call back.     Reason for Disposition   MILD swelling of both ankles (i.e., pedal edema)    Answer Assessment - Initial Assessment Questions  1. ONSET: \"When did the swelling start?\" (e.g., minutes, hours, days)      Yesterday afternoon  2. LOCATION: \"What part of the leg is swollen?\"  \"Are both legs swollen or just one leg?\"      B/l hands and feet  3. SEVERITY: \"How bad is the swelling?\" (e.g., localized; mild, moderate, severe)   - Localized - small area of swelling localized to one leg   - MILD pedal edema - swelling limited to foot and ankle, pitting edema < 1/4 inch (6 mm) deep, rest and elevation eliminate most or all swelling   - MODERATE edema - swelling of lower leg to knee, pitting edema > 1/4 inch (6 mm) deep, rest and elevation only partially reduce swelling   - SEVERE edema - swelling extends above knee, facial or hand swelling present       Non pitting  4. REDNESS: \"Does the swelling look red or infected?\"      denies  5. PAIN: \"Is the swelling painful to touch?\" If Yes, ask: \"How painful is it?\"   (Scale 1-10; mild, moderate or severe)      denies  6. FEVER: \"Do you have a fever?\" If Yes, ask: \"What is it, how was it measured, and when did it start?\"       denies  7. CAUSE: \"What do you think is causing the leg swelling?\"      denies  8. " "MEDICAL HISTORY: \"Do you have a history of blood clots, heart failure, kidney disease, liver failure, or cancer?\"      denies  9. OTHER SYMPTOMS: \"Do you have any other symptoms?\" (e.g., chest pain, difficulty breathing)      denies  10. VIBHA: \"What date are you expecting to deliver?\"       8/4/2024    Protocols used: Pregnancy - Leg Swelling and Edema-ADULT-[place]    "

## 2024-04-16 ENCOUNTER — TELEPHONE (OUTPATIENT)
Dept: OBGYN CLINIC | Facility: CLINIC | Age: 39
End: 2024-04-16

## 2024-04-16 ENCOUNTER — TELEPHONE (OUTPATIENT)
Dept: PERINATAL CARE | Facility: CLINIC | Age: 39
End: 2024-04-16

## 2024-04-16 ENCOUNTER — ULTRASOUND (OUTPATIENT)
Dept: PERINATAL CARE | Facility: OTHER | Age: 39
End: 2024-04-16
Payer: COMMERCIAL

## 2024-04-16 VITALS
SYSTOLIC BLOOD PRESSURE: 112 MMHG | BODY MASS INDEX: 40.94 KG/M2 | DIASTOLIC BLOOD PRESSURE: 78 MMHG | HEIGHT: 64 IN | HEART RATE: 87 BPM | WEIGHT: 239.8 LBS

## 2024-04-16 DIAGNOSIS — O99.210 MATERNAL OBESITY, ANTEPARTUM: ICD-10-CM

## 2024-04-16 DIAGNOSIS — O99.810 ABNORMAL GLUCOSE AFFECTING PREGNANCY: ICD-10-CM

## 2024-04-16 DIAGNOSIS — O24.912 DIABETES MELLITUS AFFECTING PREGNANCY IN SECOND TRIMESTER: ICD-10-CM

## 2024-04-16 DIAGNOSIS — Z3A.14 14 WEEKS GESTATION OF PREGNANCY: ICD-10-CM

## 2024-04-16 DIAGNOSIS — Z3A.24 24 WEEKS GESTATION OF PREGNANCY: Primary | ICD-10-CM

## 2024-04-16 DIAGNOSIS — O24.419 GESTATIONAL DIABETES MELLITUS (GDM) IN SECOND TRIMESTER, GESTATIONAL DIABETES METHOD OF CONTROL UNSPECIFIED: ICD-10-CM

## 2024-04-16 DIAGNOSIS — Z3A.15 15 WEEKS GESTATION OF PREGNANCY: ICD-10-CM

## 2024-04-16 DIAGNOSIS — O24.911 DIABETES MELLITUS AFFECTING PREGNANCY, FIRST TRIMESTER: ICD-10-CM

## 2024-04-16 DIAGNOSIS — O35.BXX0 FETAL CARDIAC ANOMALY COMPLICATING PREGNANCY, ANTEPARTUM, SINGLE GESTATION: ICD-10-CM

## 2024-04-16 DIAGNOSIS — O99.210 OBESITY AFFECTING PREGNANCY, ANTEPARTUM, UNSPECIFIED OBESITY TYPE: ICD-10-CM

## 2024-04-16 DIAGNOSIS — O09.522 MULTIGRAVIDA OF ADVANCED MATERNAL AGE IN SECOND TRIMESTER: ICD-10-CM

## 2024-04-16 PROCEDURE — 76816 OB US FOLLOW-UP PER FETUS: CPT | Performed by: OBSTETRICS & GYNECOLOGY

## 2024-04-16 PROCEDURE — 99213 OFFICE O/P EST LOW 20 MIN: CPT | Performed by: OBSTETRICS & GYNECOLOGY

## 2024-04-16 RX ORDER — INSULIN GLARGINE 100 [IU]/ML
48 INJECTION, SOLUTION SUBCUTANEOUS
Qty: 15 ML | Refills: 3 | Status: SHIPPED | OUTPATIENT
Start: 2024-04-16

## 2024-04-16 RX ORDER — INSULIN GLARGINE 100 [IU]/ML
48 INJECTION, SOLUTION SUBCUTANEOUS
Refills: 3 | OUTPATIENT
Start: 2024-04-16

## 2024-04-16 RX ORDER — INSULIN GLARGINE 100 [IU]/ML
48 INJECTION, SOLUTION SUBCUTANEOUS
Qty: 14.4 ML | Refills: 3 | Status: SHIPPED | OUTPATIENT
Start: 2024-04-16 | End: 2024-08-14

## 2024-04-16 RX ORDER — INSULIN LISPRO 100 [IU]/ML
14 INJECTION, SOLUTION INTRAVENOUS; SUBCUTANEOUS
Qty: 15 ML | Refills: 1 | Status: SHIPPED | OUTPATIENT
Start: 2024-04-16

## 2024-04-16 RX ORDER — INSULIN LISPRO 100 [IU]/ML
14 INJECTION, SOLUTION INTRAVENOUS; SUBCUTANEOUS
Refills: 1 | OUTPATIENT
Start: 2024-04-16

## 2024-04-16 RX ORDER — INSULIN LISPRO 100 [IU]/ML
INJECTION, SOLUTION INTRAVENOUS; SUBCUTANEOUS
Qty: 15 ML | Refills: 3 | Status: SHIPPED | OUTPATIENT
Start: 2024-04-16

## 2024-04-16 NOTE — TELEPHONE ENCOUNTER
"No Show Documentation  Diabetes and Pregnancy Program    Verito Mata was a no show today (04/16/24) for follow-up review of diet and blood sugars.    I called Verito by [employer] teams phone.     Reason for Call: No Show (Follow-up review of diet and blood sugars; Previously no show for f/up w/ ELIER Gray on 4/2/24) and Diabetes Type 2 (Currently Pregnant; 24w2d -  Last time Verito reported glucose readings via March on 3/16/2024 on basal/bolus insulin. Last noted doses Lantus 48 units daily and Humalog 16-14-14 before meals 1-2-3./)    Outcome: No Answer. Left VM. Sent [employer] Message. Pt was instructed to reach out to call [phone] to reschedule.    Appointments w/ DM Team:  (4/16/24) No Show for F/UP w/ Lorene Mazariegos RD - Today's Appointment  (4/2) No Show for F/UP w/ ELIER Gray - per telephone encounter w/ MA (4/11/24) pt stated \"she missed her appt because her daughter was in the hospital for a few days with a 440 blood sugar. She realizes the importance of reporting her blood sugars\"  (3/25) Canceled appt w/ ELIER Gray  (2/7/24) Class 2 w/ Lorene Mazariegos RD   (1/31/24) Class 1 w/ ELIER Gray    Labs:   (2/5/24) A1c = 7.0%    Verito's Current BG Log via [employer] Glucose Flowsheet (last 7 days reported):         Lorene Mazariegos RD  Diabetes Educator   [facility]  Diabetes and Pregnancy Program        "

## 2024-04-16 NOTE — TELEPHONE ENCOUNTER
MsLee Mata is looking to obtain a letter for her Employer [employer] to be placed on Disability.  Pt was last seen by Dr. Peralta and has lost all feeling in her hands.  Please call to discuss.

## 2024-04-16 NOTE — LETTER
April 18, 2024     Nisha Peralta DO  [address] [address]    Patient: Verito Mata   YOB: 1985   Date of Visit: 4/16/2024       Dear Dr. Peralta:    Thank you for referring Verito Mata to me for evaluation. Below are my notes for this consultation.    If you have questions, please do not hesitate to call me. I look forward to following your patient along with you.         Sincerely,        Cj Angel MD        CC: No Recipients    Cj Angel MD  4/18/2024 11:00 AM  Sign when Signing Visit  A fetal ultrasound was completed. See Ob procedures in Epic for an interpretation and recommendations. Do not hesitate to contact us in [facility] if you have questions.    Cj Angel MD, MSCE  Maternal Fetal Medicine

## 2024-04-17 ENCOUNTER — TELEMEDICINE (OUTPATIENT)
Facility: HOSPITAL | Age: 39
End: 2024-04-17
Payer: COMMERCIAL

## 2024-04-17 VITALS — HEIGHT: 64 IN | BODY MASS INDEX: 40.8 KG/M2 | WEIGHT: 239 LBS

## 2024-04-17 DIAGNOSIS — O99.210 MATERNAL OBESITY, ANTEPARTUM: ICD-10-CM

## 2024-04-17 DIAGNOSIS — Z3A.24 24 WEEKS GESTATION OF PREGNANCY: ICD-10-CM

## 2024-04-17 DIAGNOSIS — O24.912 DIABETES MELLITUS AFFECTING PREGNANCY IN SECOND TRIMESTER: Primary | ICD-10-CM

## 2024-04-17 DIAGNOSIS — O99.810 HYPERGLYCEMIA IN PREGNANCY: ICD-10-CM

## 2024-04-17 PROBLEM — O24.112 PRE-EXISTING TYPE 2 DIABETES MELLITUS WITH HYPERGLYCEMIA DURING PREGNANCY IN SECOND TRIMESTER (HCC): Status: ACTIVE | Noted: 2024-01-29

## 2024-04-17 PROBLEM — E11.65 PRE-EXISTING TYPE 2 DIABETES MELLITUS WITH HYPERGLYCEMIA DURING PREGNANCY IN SECOND TRIMESTER (HCC): Status: ACTIVE | Noted: 2024-01-29

## 2024-04-17 PROCEDURE — 99214 OFFICE O/P EST MOD 30 MIN: CPT | Performed by: NURSE PRACTITIONER

## 2024-04-17 RX ORDER — ACYCLOVIR 400 MG/1
1 TABLET ORAL
Qty: 3 EACH | Refills: 4 | Status: SHIPPED | OUTPATIENT
Start: 2024-04-17

## 2024-04-17 RX ORDER — ACYCLOVIR 400 MG/1
1 TABLET ORAL CONTINUOUS
Qty: 1 EACH | Refills: 0 | Status: SHIPPED | OUTPATIENT
Start: 2024-04-17

## 2024-04-17 NOTE — PROGRESS NOTES
Virtual Regular Visit    Verification of patient location:    Patient is located at Home in the following state in which I hold an active license [place]      Assessment/Plan:    Problem List Items Addressed This Visit       24 weeks gestation of pregnancy    Relevant Medications    Continuous Blood Gluc Sensor (Dexcom G7 Sensor)    Continuous Blood Gluc  (Dexcom G7 ) SIGRID    Insulin Pen Needle 31G X 5 MM MISC    Other Relevant Orders    Comprehensive metabolic panel    Hemoglobin A1C    Protein / creatinine ratio, urine    Diabetes mellitus affecting pregnancy in second trimester - Primary     -Last A1c 7%; aim for 5.6% or less with minimal hypoglycemia.   -CMP within normal except for glucose level.   -Check UPCR, repeat A1c and CMP with 28 week labs.   -Due to reported FBS ranging ; increase Lantus to 60 units daily at 11 PM split dose into 2 (30 units each dose injected one after another into 2 different sites spaced 3 inches apart). Write down units to be injected as a visual before injecting yourself.   -Due to reported 2 hours post meal glucose readings ranging from 120-159; sometimes post lunch less than 120; adjust Humalog to 18-16-18 before meals 1-2-3.   -3/16/2024 last date glucose readings reported into glucose flowsheet.  -Very important to report glucose readings at minimum weekly for recommendations.   -Per your request Dexcom G7 CGM ordered but it does not replace recommendations to test glucose via finger stick for fasting and 2 hours post start of each meal. Very important to complete actual finger stick for fasting every morning.   -Please provider Dexcom username/password to view glucose data.   -Self monitoring blood glucose (SMBG) fasting; 2 hours after start of each meal and with hypoglycemia.   -Glucose goals: fasting 60-90 mg/dL, 140 mg/dL or less 1 hour post meals, and 120 mg/dL or less 2 hours post meal.   -Report glucose readings weekly via enrich-int every Wednesday.    -GDM 2000 calorie meal plan with 3 meals and 3 snacks including recommended combination of carb, protein and fat per meal/snack.  -Please eat meal or snack every 2-3.5 hours while awake.  -No more than 8 to 10 hours of fasting overnight.  -Refer to Sweet [employer] MyPlate online.   -Minimum total daily carbohydrates 175 grams paired with half grams in protein.   -Stay active if no restriction from your OB, walk up to 30 minutes a day.  -Always have glucose available to treat hypoglycemia. Use 15:15 rule. Refer to hypoglycemia patient education sheet. SMBG when experiencing signs and symptoms of hypoglycemia and prior to driving.   -Fetal growth ultrasound every 4 to 6 weeks gestation.  -20 week detailed fetal growth ultrasound completed 3/18/2024.   -22-24 weeks fetal echo completed 4/9/2024.   -Starting 32 weeks gestation, NST twice a week and SAKINA weekly.   -Continue prenatal vitamin and baby aspirin as recommended.  -At 36 weeks gestation, discontinue baby aspirin.   -Continue follow-up with your OB and MFM as recommended.  -Stay in close contact with diabetes education team.  -Schedule an appointment with an endocrinologist for August 2024 for postpartum diabetes evaluation.   Lab Results   Component Value Date    HGBA1C 7.0 (H) 02/05/2024            Relevant Medications    Continuous Blood Gluc Sensor (Dexcom G7 Sensor)    Continuous Blood Gluc  (Dexcom G7 ) SIGRID    Insulin Pen Needle 31G X 5 MM MISC    Other Relevant Orders    Comprehensive metabolic panel    Hemoglobin A1C    Protein / creatinine ratio, urine    Maternal obesity, antepartum     -Pre-pregnancy BMI 36.89. Weight 215 lbs.  -Current BMI 41.16. Current weight 239 lbs.  -TWG 24 lbs.  -Recommended weight gain 11 to 20 lbs.          Relevant Medications    Continuous Blood Gluc Sensor (Dexcom G7 Sensor)    Continuous Blood Gluc  (Dexcom G7 ) SIGRID    Insulin Pen Needle 31G X 5 MM MISC    Other Relevant Orders     Comprehensive metabolic panel    Hemoglobin A1C    Protein / creatinine ratio, urine    BMI 40.0-44.9, adult (HCC)    Relevant Medications    Continuous Blood Gluc Sensor (Dexcom G7 Sensor)    Continuous Blood Gluc  (Dexcom G7 ) SIGRID    Insulin Pen Needle 31G X 5 MM MISC    Other Relevant Orders    Comprehensive metabolic panel    Hemoglobin A1C    Protein / creatinine ratio, urine    Hyperglycemia in pregnancy     Basal/bolus insulin adjusted.          Relevant Medications    Insulin Pen Needle 31G X 5 MM MISC            Reason for visit is   Chief Complaint   Patient presents with    Virtual Regular Visit    Diabetes Type 2          Encounter provider ELIER Gray    Provider located at [facility]  [address] [facility] [address]      Recent Visits  No visits were found meeting these conditions.  Showing recent visits within past 7 days and meeting all other requirements  Today's Visits  Date Type Provider Dept   24 Telemedicine ELIER Gray An    Showing today's visits and meeting all other requirements  Future Appointments  No visits were found meeting these conditions.  Showing future appointments within next 150 days and meeting all other requirements       The patient was identified by name and date of birth. Verito Mata was informed that this is a telemedicine visit and that the visit is being conducted through the Epic Embedded platform. She agrees to proceed..  My office door was closed. No one else was in the room.  She acknowledged consent and understanding of privacy and security of the video platform. The patient has agreed to participate and understands they can discontinue the visit at any time.    Patient is aware this is a billable service.     Subjective  Verito Mata is a 39 y.o. female  DM on Lantus 48 units at 11 PM, Humalog 16-14-14 before meals 1-2-3. Last date  glucose readings reported via flowsheet 3/16/2024; encouraged to report glucose readings weekly. Interested in CGM but informed actual finger sticks are still recommended especially FBS readings due to adjusting Lantus dose.     Does eat 3 meals a day but does not snack due to busy work schedule and about 3 days a week will have dinner at 10 PM. Encouraged to eat at least every 3.5 hours during the day and consider a snack drink combo carb/protein given busy schedule.        Past Medical History:   Diagnosis Date    Anxiety     Depression     Ectopic pregnancy 2015    Miscarriage     2018, 2020 x 2    Type 2 diabetes mellitus affecting pregnancy in second trimester, antepartum 2024       Past Surgical History:   Procedure Laterality Date    APPENDECTOMY  2014    DIAGNOSTIC LAPAROSCOPY Left 2015    ectopic- left (tube was cut, not removed per pt)    MULTIPLE TOOTH EXTRACTIONS  2006    all teeth removed       Current Outpatient Medications   Medication Sig Dispense Refill    Continuous Blood Gluc  (Dexcom G7 ) SIGRID Use 1 Device continuous T2DM and pregnancy 1 each 0    Continuous Blood Gluc Sensor (Dexcom G7 Sensor) Use 1 Device every 10 days T2DM on intensive insulin therapy and pregnancy. 3 each 4    Insulin Pen Needle 31G X 5 MM MISC Use up to 5 pen needles a day. T2DM and pregnancy. 200 each 5    aspirin 81 mg chewable tablet Chew 2 tablets (162 mg total) daily 180 tablet 1    Blood Glucose Monitoring Suppl (Contour Next EZ) w/Device KIT Dispense 1 kit per insurance formulary. GDM. 1 kit 0    Contour Next Test test strip Test 4 times a day.  each 7    Insulin Glargine Solostar (Lantus SoloStar) 100 UNIT/ML SOPN Inject 0.48 mL (48 Units total) under the skin daily at bedtime 14.4 mL 3    insulin lispro (HumaLOG KwikPen) 100 units/mL injection pen 16units under the skin prior to breakfast, 14units under the skin prior to lunch and dinner 15 mL 3    insulin lispro (HumaLOG) 100 units/mL  "injection pen INJECT 14 UNITS UNDER THE SKIN 3 (THREE) TIMES A DAY WITH MEALS 16UNITS BEFORE BREAKFAST, 14UNITS BEFORE LUNCH AND DINNER 15 mL 1    Lantus SoloStar 100 units/mL SOPN INJECT 0.48 ML (48 UNITS TOTAL) UNDER THE SKIN DAILY AT BEDTIME INJECT 40 UNITS ONCE DAILY AT BEDTIME (10-11PM) 15 mL 3    Microlet Lancets MISC Use 4 a day.  each 7    ondansetron (ZOFRAN) 4 mg tablet Take 1 tablet (4 mg total) by mouth every 8 (eight) hours as needed for nausea or vomiting (Patient not taking: Reported on 2/5/2024) 20 tablet 0    Prenatal Vit w/Xt-Mxposzjdt-ZC (PNV PO) Take by mouth       No current facility-administered medications for this visit.        No Known Allergies    Review of Systems   Constitutional:  Negative for fatigue and fever.   HENT:  Negative for congestion.    Eyes:  Negative for visual disturbance.   Respiratory:  Negative for cough and shortness of breath.    Cardiovascular:  Negative for chest pain, palpitations and leg swelling (feet swelling).   Gastrointestinal:  Negative for constipation, diarrhea, nausea and vomiting.   Endocrine: Negative for polydipsia, polyphagia and polyuria.   Genitourinary:  Negative for difficulty urinating and vaginal bleeding.   Neurological:  Positive for numbness (bilateral hands). Negative for headaches.   Psychiatric/Behavioral:  Negative for sleep disturbance.        Video Exam  Glucose flowsheet readings noted until 3/16/2024. Today reported  with other readings 95; 100; 119 last couple of days and 2 hours postprandial readings 120-159, higher readings noted post breakfast and post dinner. Post lunch sometimes less than 120. Insulin adjustments provided and encouraged to report via flowsheet for recommendations.   Vitals:    04/17/24 1032   Weight: 108 kg (239 lb)   Height: 5' 4\" (1.626 m)       Physical Exam  Constitutional:       Appearance: She is obese.   HENT:      Head: Normocephalic.      Nose: Nose normal.   Eyes:      Conjunctiva/sclera: " Conjunctivae normal.   Pulmonary:      Effort: Pulmonary effort is normal.   Neurological:      Mental Status: She is alert and oriented to person, place, and time.          Visit Time  Total Visit Duration: 24 minutes with patient; 15 minutes charting.

## 2024-04-17 NOTE — PATIENT INSTRUCTIONS
-Last A1c 7%; aim for 5.6% or less with minimal hypoglycemia.   -CMP within normal except for glucose level.   -Check UPCR, repeat A1c and CMP with 28 week labs.   -Due to reported FBS ranging ; increase Lantus to 60 units daily at 11 PM split dose into 2 (30 units each dose injected one after another into 2 different sites spaced 3 inches apart). Write down units to be injected as a visual before injecting yourself.   -Due to reported 2 hours post meal glucose readings ranging from 120-159; sometimes post lunch less than 120; adjust Humalog to 18-16-18 before meals 1-2-3.   -3/16/2024 last date glucose readings reported into glucose flowsheet.  -Very important to report glucose readings at minimum weekly for recommendations.   -Per your request Dexcom G7 CGM ordered but it does not replace recommendations to test glucose via finger stick for fasting and 2 hours post start of each meal. Very important to complete actual finger stick for fasting every morning.   -Please provider Dexcom username/password to view glucose data.   -Self monitoring blood glucose (SMBG) fasting; 2 hours after start of each meal and with hypoglycemia.   -Glucose goals: fasting 60-90 mg/dL, 140 mg/dL or less 1 hour post meals, and 120 mg/dL or less 2 hours post meal.   -Report glucose readings weekly via [employer] every Wednesday.   -GDM 2000 calorie meal plan with 3 meals and 3 snacks including recommended combination of carb, protein and fat per meal/snack.  -Please eat meal or snack every 2-3.5 hours while awake.  -No more than 8 to 10 hours of fasting overnight.  -Refer to [employer] MyPlate online.   -Minimum total daily carbohydrates 175 grams paired with half grams in protein.   -Stay active if no restriction from your OB, walk up to 30 minutes a day.  -Always have glucose available to treat hypoglycemia. Use 15:15 rule. Refer to hypoglycemia patient education sheet. SMBG when experiencing signs and symptoms of hypoglycemia and  prior to driving.   -Fetal growth ultrasound every 4 to 6 weeks gestation.  -20 week detailed fetal growth ultrasound completed 3/18/2024.   -22-24 weeks fetal echo completed 4/9/2024.   -Starting 32 weeks gestation, NST twice a week and SAKINA weekly.   -Continue prenatal vitamin and baby aspirin as recommended.  -At 36 weeks gestation, discontinue baby aspirin.   -Continue follow-up with your OB and MFM as recommended.  -Stay in close contact with diabetes education team.  -Schedule an appointment with an endocrinologist for August 2024 for postpartum diabetes evaluation.

## 2024-04-17 NOTE — ASSESSMENT & PLAN NOTE
-Pre-pregnancy BMI 36.89. Weight 215 lbs.  -Current BMI 41.16. Current weight 239 lbs.  -TWG 24 lbs.  -Recommended weight gain 11 to 20 lbs.

## 2024-04-17 NOTE — ASSESSMENT & PLAN NOTE
-Last A1c 7%; aim for 5.6% or less with minimal hypoglycemia.   -CMP within normal except for glucose level.   -Check UPCR, repeat A1c and CMP with 28 week labs.   -Due to reported FBS ranging ; increase Lantus to 60 units daily at 11 PM split dose into 2 (30 units each dose injected one after another into 2 different sites spaced 3 inches apart). Write down units to be injected as a visual before injecting yourself.   -Due to reported 2 hours post meal glucose readings ranging from 120-159; sometimes post lunch less than 120; adjust Humalog to 18-16-18 before meals 1-2-3.   -3/16/2024 last date glucose readings reported into glucose flowsheet.  -Very important to report glucose readings at minimum weekly for recommendations.   -Per your request Dexcom G7 CGM ordered but it does not replace recommendations to test glucose via finger stick for fasting and 2 hours post start of each meal. Very important to complete actual finger stick for fasting every morning.   -Please provider Dexcom username/password to view glucose data.   -Self monitoring blood glucose (SMBG) fasting; 2 hours after start of each meal and with hypoglycemia.   -Glucose goals: fasting 60-90 mg/dL, 140 mg/dL or less 1 hour post meals, and 120 mg/dL or less 2 hours post meal.   -Report glucose readings weekly via jigl every Wednesday.   -GDM 2000 calorie meal plan with 3 meals and 3 snacks including recommended combination of carb, protein and fat per meal/snack.  -Please eat meal or snack every 2-3.5 hours while awake.  -No more than 8 to 10 hours of fasting overnight.  -Refer to [employer] Success MyPlate online.   -Minimum total daily carbohydrates 175 grams paired with half grams in protein.   -Stay active if no restriction from your OB, walk up to 30 minutes a day.  -Always have glucose available to treat hypoglycemia. Use 15:15 rule. Refer to hypoglycemia patient education sheet. SMBG when experiencing signs and symptoms of hypoglycemia and  prior to driving.   -Fetal growth ultrasound every 4 to 6 weeks gestation.  -20 week detailed fetal growth ultrasound completed 3/18/2024.   -22-24 weeks fetal echo completed 4/9/2024.   -Starting 32 weeks gestation, NST twice a week and SAKINA weekly.   -Continue prenatal vitamin and baby aspirin as recommended.  -At 36 weeks gestation, discontinue baby aspirin.   -Continue follow-up with your OB and MFM as recommended.  -Stay in close contact with diabetes education team.  -Schedule an appointment with an endocrinologist for August 2024 for postpartum diabetes evaluation.   Lab Results   Component Value Date    HGBA1C 7.0 (H) 02/05/2024

## 2024-04-18 NOTE — PROGRESS NOTES
A fetal ultrasound was completed. See Ob procedures in Epic for an interpretation and recommendations. Do not hesitate to contact us in [facility] if you have questions.    Cj Angel MD, MSCE  Maternal Fetal Medicine

## 2024-04-26 ENCOUNTER — TELEPHONE (OUTPATIENT)
Dept: PERINATAL CARE | Facility: CLINIC | Age: 39
End: 2024-04-26

## 2024-04-26 NOTE — TELEPHONE ENCOUNTER
Reason for Call: Reminder to Share Dexcom Data and Diabetes Type 2 (Currently Pregnant; 25w5d)    Outcome: Spoke with patient. She explains that she just started the Dexcom and needs to set up an account on the tita. She confirmed that she is currently taking 60 units of Lantus and Humalog 18-16-18 with meals 1-2-3. She reports no concerns with her blood sugars at this time.    - Encouraged to share Dexcom data, reviewed instructions  - Will review patient's blood sugars on Monday (4/29)    Lorene Mazariegos RD  Diabetes Educator   [facility]  Diabetes and Pregnancy Program

## 2024-04-30 ENCOUNTER — ROUTINE PRENATAL (OUTPATIENT)
Age: 39
End: 2024-04-30

## 2024-04-30 DIAGNOSIS — Z36.89 NORMAL FETAL CARDIAC EXAM: Primary | ICD-10-CM

## 2024-04-30 NOTE — PROGRESS NOTES
Fetal Cardiology Consult    Date of Visit:             2024  Gestational Age:           26w2d   Estimated Date of Delivery: 24   Referring provider:                Fabian     Reason for consultation: Large pulmonary valve    Fetal Echocardiogram demonstrated normal cardiac anatomy and function. AV and semilunar valves all measure within normal limits, with no ventricular size discrepancy.     I reviewed the normal findings.. We also discussed, that due to the technical limitations of fetal echocardiography and the nature of fetal circulation, a number of cardiovascular defects cannot be definitively ruled out at this stage.  Examples include but are not limited to: ASD, PDA, small VSD, coarctation of the aorta, partial anomalous pulmonary venous connection. Please see full echocardiogram report under OB procedures.    Assessment and Recommendations:  -Normal fetal cardiac anatomy and function.   -Normal  care and prenatal care are recommended.    -There is no follow-up needed.      Stan Redman MD  Pediatric Cardiology  [facility]  Phone:[phone]  Fax: [phone]  [email or web address]    I spent 60minutes - on the day of service - reviewing the patient's chart, reviewing previous imaging studies, counseling the patient about the fetal cardiac anatomy and plan, coordinating care, and documenting care.

## 2024-05-08 ENCOUNTER — TELEPHONE (OUTPATIENT)
Dept: PERINATAL CARE | Facility: CLINIC | Age: 39
End: 2024-05-08

## 2024-05-10 DIAGNOSIS — O24.912 DIABETES MELLITUS AFFECTING PREGNANCY IN SECOND TRIMESTER: ICD-10-CM

## 2024-05-10 DIAGNOSIS — Z3A.24 24 WEEKS GESTATION OF PREGNANCY: ICD-10-CM

## 2024-05-10 DIAGNOSIS — O99.210 MATERNAL OBESITY, ANTEPARTUM: ICD-10-CM

## 2024-05-10 RX ORDER — ACYCLOVIR 400 MG/1
1 TABLET ORAL
Qty: 3 EACH | Refills: 0 | Status: SHIPPED | OUTPATIENT
Start: 2024-05-10

## 2024-05-11 DIAGNOSIS — O24.911 DIABETES MELLITUS AFFECTING PREGNANCY, FIRST TRIMESTER: ICD-10-CM

## 2024-05-13 RX ORDER — INSULIN GLARGINE 100 [IU]/ML
INJECTION, SOLUTION SUBCUTANEOUS
Qty: 45 ML | Refills: 2 | Status: SHIPPED | OUTPATIENT
Start: 2024-05-13

## 2024-05-14 ENCOUNTER — ULTRASOUND (OUTPATIENT)
Dept: PERINATAL CARE | Facility: OTHER | Age: 39
End: 2024-05-14
Payer: COMMERCIAL

## 2024-05-14 VITALS
DIASTOLIC BLOOD PRESSURE: 82 MMHG | HEIGHT: 64 IN | HEART RATE: 106 BPM | BODY MASS INDEX: 41.21 KG/M2 | WEIGHT: 241.4 LBS | SYSTOLIC BLOOD PRESSURE: 116 MMHG

## 2024-05-14 DIAGNOSIS — O99.210 MATERNAL OBESITY, ANTEPARTUM: ICD-10-CM

## 2024-05-14 DIAGNOSIS — O24.913 DIABETES MELLITUS AFFECTING PREGNANCY IN THIRD TRIMESTER: ICD-10-CM

## 2024-05-14 DIAGNOSIS — O09.523 MULTIGRAVIDA OF ADVANCED MATERNAL AGE IN THIRD TRIMESTER: ICD-10-CM

## 2024-05-14 DIAGNOSIS — O99.210 OBESITY AFFECTING PREGNANCY, ANTEPARTUM, UNSPECIFIED OBESITY TYPE: Primary | ICD-10-CM

## 2024-05-14 DIAGNOSIS — Z3A.28 28 WEEKS GESTATION OF PREGNANCY: ICD-10-CM

## 2024-05-14 PROCEDURE — 99214 OFFICE O/P EST MOD 30 MIN: CPT | Performed by: OBSTETRICS & GYNECOLOGY

## 2024-05-14 PROCEDURE — 76816 OB US FOLLOW-UP PER FETUS: CPT | Performed by: OBSTETRICS & GYNECOLOGY

## 2024-05-14 NOTE — PROGRESS NOTES
A fetal ultrasound was completed. See Ob procedures in Epic for an interpretation and recommendations. Do not hesitate to contact us in [facility] if you have questions.    jC Angel MD, MSCE  Maternal Fetal Medicine

## 2024-05-14 NOTE — LETTER
May 14, 2024     Nisha Peralta DO  [address] [facility] [address]    Patient: Verito Mata   YOB: 1985   Date of Visit: 5/14/2024       Dear Dr. Peralta:    Thank you for referring Verito Mata to me for evaluation. Below are my notes for this consultation.    If you have questions, please do not hesitate to call me. I look forward to following your patient along with you.         Sincerely,        Cj Angel MD        CC: No Recipients    Cj Angel MD  5/14/2024  3:22 PM  Sign when Signing Visit  A fetal ultrasound was completed. See Ob procedures in Epic for an interpretation and recommendations. Do not hesitate to contact us in [facility] if you have questions.    Cj Angel MD, MSCE  Maternal Fetal Medicine

## 2024-05-15 ENCOUNTER — TELEPHONE (OUTPATIENT)
Dept: PERINATAL CARE | Facility: CLINIC | Age: 39
End: 2024-05-15

## 2024-05-15 NOTE — TELEPHONE ENCOUNTER
Reminder to Report Blood Sugars    I called Verito by [employer] teams phone.     The reason for my call was: Reminder to Report Blood Sugars (Last Reported BG: 3/16/24 via [email or web address] Glucose Flowsheet /), Gestational Diabetes (28w3d  Insulin-controlled/), and CGM in Place (Dexcom; Unable to view data; Data not shared)    Call Outcome: No Answer, Left Voicemail , Advised to call #[phone] for questions or difficulty reporting blood sugars    Lorene Mazariegos RD MS  Diabetes Educator   [facility]  Diabetes and Pregnancy Program

## 2024-05-24 ENCOUNTER — TREATMENT (OUTPATIENT)
Dept: PERINATAL CARE | Facility: CLINIC | Age: 39
End: 2024-05-24

## 2024-05-24 DIAGNOSIS — O24.113 PRE-EXISTING TYPE 2 DIABETES MELLITUS IN PREGNANCY IN THIRD TRIMESTER: Primary | ICD-10-CM

## 2024-05-24 NOTE — PROGRESS NOTES
"  Date: 05/24/24  Verito Mata  1985  Estimated Date of Delivery: 8/4/24  29w5d  OB/GYN:ANDREA    Diagnosis:  Pre-existing Type 2 Diabetes in pregnancy    Blood Sugar Logs Submitted via:  DexCom G7 report . Report indicates her average BG is 189 & only 7% of time is in range. Almost all BG results are >140.    Patient stated when called today at 5:40 PM, her BG is lowest today at 85 after lunch & currently 111 after eating dinner 45 minutes ago  Stated her FBS is generally 120-140, since last insulin increase on 5/20/24     Assessment and Plan: Called patient to determine her exact doses. Stated she last increased all her insulins on Monday, 5/20/24    Lantus Increase from 72 to 80 units at  11 PM; split into to doses of 40 units each.   Humalog--continue 22 units at breakfast, 20 units at lunch & 22 units with dinner.    2000 calorie (CHO:45-15-60-15-60-30) (PRO: 2/3-1-3/4-1-3/4-2) meal plan consisting of 3 meals and 3 snacks daily including protein at each  Advised patient to continue current meal plan. Advised schedule a DSMT Nutrition follow-up  Avoid fasting for > 10 hours overnight  Continue SMBG 4 x per day (Fasting, 2 hour after start of each meal) with a Contour Next EZ glucose meter  If okay by physician, recommend up to 30 minutes of physical activity daily     Lab Work:   Lab Results   Component Value Date    HGBA1C 7.0 (H) 02/05/2024      HbA1c was reordered on 4/17/24--encouraged patient complete soon.     Ultrasound:       Date:5/17/24       Fetal Growth:  AC->99% & EFW-94%       SAKINA: Normal  Next 6/18/24    Diabetes Self Management Support Plan outside of ongoing care: Spouse/Family   Patient Stated Goal: \"I will check my blood sugar 4 times each day, as directed by diabetes and pregnancy team\"   Goal Assessment: Not on track    Date to report next: Tuesday, 5/28/24    Nanyc Suarez, MS, RD, [facility]  Diabetes Educator  [facility] Maternal Fetal Medicine  Diabetes in Pregnancy Program  [address] [facility], [address]  [place] [address]

## 2024-05-30 ENCOUNTER — ROUTINE PRENATAL (OUTPATIENT)
Dept: OBGYN CLINIC | Facility: MEDICAL CENTER | Age: 39
End: 2024-05-30
Payer: COMMERCIAL

## 2024-05-30 VITALS
HEART RATE: 119 BPM | WEIGHT: 253 LBS | SYSTOLIC BLOOD PRESSURE: 130 MMHG | DIASTOLIC BLOOD PRESSURE: 80 MMHG | HEIGHT: 64 IN | BODY MASS INDEX: 43.19 KG/M2

## 2024-05-30 DIAGNOSIS — O09.523 MULTIGRAVIDA OF ADVANCED MATERNAL AGE IN THIRD TRIMESTER: ICD-10-CM

## 2024-05-30 DIAGNOSIS — O35.BXX0 FETAL CARDIAC ANOMALY COMPLICATING PREGNANCY, ANTEPARTUM, SINGLE GESTATION: ICD-10-CM

## 2024-05-30 DIAGNOSIS — Z34.83 PRENATAL CARE, SUBSEQUENT PREGNANCY, THIRD TRIMESTER: Primary | ICD-10-CM

## 2024-05-30 DIAGNOSIS — Z3A.30 30 WEEKS GESTATION OF PREGNANCY: ICD-10-CM

## 2024-05-30 DIAGNOSIS — Z23 ENCOUNTER FOR IMMUNIZATION: ICD-10-CM

## 2024-05-30 DIAGNOSIS — O24.913 DIABETES MELLITUS AFFECTING PREGNANCY IN THIRD TRIMESTER: ICD-10-CM

## 2024-05-30 DIAGNOSIS — O99.210 MATERNAL OBESITY, ANTEPARTUM: ICD-10-CM

## 2024-05-30 LAB
DME PARACHUTE DELIVERY DATE REQUESTED: NORMAL
DME PARACHUTE ITEM DESCRIPTION: NORMAL
DME PARACHUTE ORDER STATUS: NORMAL
DME PARACHUTE SUPPLIER NAME: NORMAL
DME PARACHUTE SUPPLIER PHONE: NORMAL
SL AMB  POCT GLUCOSE, UA: ABNORMAL
SL AMB POCT URINE PROTEIN: ABNORMAL

## 2024-05-30 PROCEDURE — 90471 IMMUNIZATION ADMIN: CPT | Performed by: CLINICAL NURSE SPECIALIST

## 2024-05-30 PROCEDURE — 81002 URINALYSIS NONAUTO W/O SCOPE: CPT | Performed by: CLINICAL NURSE SPECIALIST

## 2024-05-30 PROCEDURE — PNV: Performed by: CLINICAL NURSE SPECIALIST

## 2024-05-30 PROCEDURE — 90715 TDAP VACCINE 7 YRS/> IM: CPT | Performed by: CLINICAL NURSE SPECIALIST

## 2024-05-30 NOTE — ASSESSMENT & PLAN NOTE
Using dexcom for BG - rarely in range  Per MFM notes. -140 and PP > 140   MFM continuing to titrate insulin.   Encouraged strict control for best outcomes  Struggling with finances-has high copay for supplies/insulin.  Waiting to get new sensor for dex com.      Lab Results   Component Value Date    HGBA1C 7.0 (H) 02/05/2024

## 2024-05-30 NOTE — PROGRESS NOTES
Verito is a 39 y.o.  30w4d here for Routine Prenatal Visit (VIBHA 24/Blood type O+/Labs active /Yellow folder given and reviewed /Del consent signed/Breast pump ordered/Peds not needed /Tdap given today /Rhogam not needed /Flu /RSV/Urine trace/pos/GBS //Denies LOF, VB, or CTX./Pt has +FM/Questions or conerns- /)    Prenatal Visit  Subjective:   Denies unusual vaginal discharge, LOF, VB, or ctx. Reports Fetus is active.     C/O swelling- worse as the days goes on.- but not completely resolving overnight  + vision changes a few days ago, but not today.  13 lbs in 2 wks.   Some mild HA- but reports not new- has hx of frequent HA. Denies RUQ pain.  Taking Low Dose Aspirin for pre-e prev.       Having insurance issues- was using dexcom  Not checking lately.. [facility] out of sensors copay is high.  Getting low on lantus.      Objective:  Vitals:    24 1657   BP: 130/80   Pulse:      Pregravid Weight/BMI: 97.5 kg (215 lb) (BMI 36.89)  Current Weight: 115 kg (253 lb)   Total Weight Gain: 17.2 kg (38 lb)     Fetal Heart Rate: 150 , Fundal Height (cm): 32 cm    OBGyn Exam    Assessment & Plan:  1. Prenatal care, subsequent pregnancy, third trimester  -     POCT urine dip  2. Encounter for immunization  -     Tdap Vaccine greater than or equal to 6yo  3. 30 weeks gestation of pregnancy  Assessment & Plan:  , 30w4d  Overall doing well, reporting good FM.   Reviewed rationale for tdap vaccine in 3rd trimester and she did received Tdap vaccine today.    We again reviewed the S/S PTL and importance of consistent/regular FM. Reviewed process for FKC and encouraged pt to call with any decreases in fetal movement    4. Diabetes mellitus affecting pregnancy in third trimester  Assessment & Plan:  Using dexcom for BG - rarely in range  Per MFM notes. -140 and PP > 140   MFM continuing to titrate insulin.   Encouraged strict control for best outcomes  Struggling with finances-has high copay for  supplies/insulin.  Waiting to get new sensor for dex com.      Lab Results   Component Value Date    HGBA1C 7.0 (H) 02/05/2024     5. Fetal cardiac anomaly complicating pregnancy, antepartum, single gestation  Assessment & Plan:  Fetal echo was nml    6. Maternal obesity, antepartum  Assessment & Plan:  + 13 lbs in 2 wks. C/O swelling- worse as the days goes on.- but not completely resolving overnight. On exam only trace to +1 LE swelling  + vision changes a few days ago, but not today.  Some mild HA- but reports not new- has hx of frequent HA. Denies RUQ pain.  Taking Low Dose Aspirin for pre-e prev.   Initially BP mildly elevated, rpt WNL.   Will cont to monitor   No proteinuria today   7. Multigravida of advanced maternal age in third trimester     ELIER Mart  5/31/2024

## 2024-05-31 ENCOUNTER — NURSE TRIAGE (OUTPATIENT)
Age: 39
End: 2024-05-31

## 2024-05-31 NOTE — TELEPHONE ENCOUNTER
"Verito noted internal vaginal dryness, irritation. Denies vaginal odor or discharge.  Denies urinary symptoms. She denies ever having yeast in past.    Recommended monistat OTC to see if symptoms improve over weekend. She does not like to insert anything or use creams \"down there\" Verito would like oral treatment to improve symptoms.     Recommended appointment for evaluation. 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 Advised will forward to Madeleine to review    Reason for Disposition   Symptoms of a yeast infection (i.e., itchy, white discharge, not bad smelling) and not improved > 3 days following CARE ADVICE    Answer Assessment - Initial Assessment Questions  1. SYMPTOM: \"What's the main symptom you're concerned about?\" (e.g., pain, itching, dryness)      Vaginal dryness,irritated, feels itchy after wiping  2. LOCATION: \"Where is the  irritation located?\" (e.g., inside/outside, left/right)      external  3. ONSET: \"When did the  irritation  start?\"      This morning  4. PAIN: \"Is there any pain?\" If Yes, ask: \"How bad is it?\" (Scale: 1-10; mild, moderate, severe)      irritation  5. ITCHING: \"Is there any itching?\" If Yes, ask: \"How bad is it?\" (Scale: 1-10; mild, moderate, severe)      Itching with wiping  6. CAUSE: \"What do you think is causing the discharge?\" \"Have you had the same problem before? What happened then?\"      unsure  7. OTHER SYMPTOMS: \"Do you have any other symptoms?\" (e.g., fever, itching, vaginal bleeding, pain with urination, injury to genital area, vaginal foreign body)      denies  8. PREGNANCY: \"Is there any chance you are pregnant?\" \"When was your last menstrual period?\"      30w5d    Protocols used: Vaginal Symptoms-ADULT-[place]    "

## 2024-05-31 NOTE — ASSESSMENT & PLAN NOTE
, 30w4d  Overall doing well, reporting good FM.   Reviewed rationale for tdap vaccine in 3rd trimester and she did received Tdap vaccine today.    We again reviewed the S/S PTL and importance of consistent/regular FM. Reviewed process for FKC and encouraged pt to call with any decreases in fetal movement

## 2024-05-31 NOTE — ASSESSMENT & PLAN NOTE
+ 13 lbs in 2 wks. C/O swelling- worse as the days goes on.- but not completely resolving overnight. On exam only trace to +1 LE swelling  + vision changes a few days ago, but not today.  Some mild HA- but reports not new- has hx of frequent HA. Denies RUQ pain.  Taking Low Dose Aspirin for pre-e prev.   Initially BP mildly elevated, rpt WNL.   Will cont to monitor   No proteinuria today

## 2024-05-31 NOTE — TELEPHONE ENCOUNTER
Regardin weeks vaginal irritation  ----- Message from Phan HYATT sent at 2024  3:02 PM EDT -----  Patient is 31 weeks having vaginal irritation, she said it's dry and irritated.

## 2024-06-03 LAB
DME PARACHUTE DELIVERY DATE REQUESTED: NORMAL
DME PARACHUTE ITEM DESCRIPTION: NORMAL
DME PARACHUTE ORDER STATUS: NORMAL
DME PARACHUTE SUPPLIER NAME: NORMAL
DME PARACHUTE SUPPLIER PHONE: NORMAL

## 2024-06-05 ENCOUNTER — TELEPHONE (OUTPATIENT)
Dept: OBGYN CLINIC | Facility: CLINIC | Age: 39
End: 2024-06-05

## 2024-06-05 DIAGNOSIS — Z76.89 REFERRAL OF PATIENT: Primary | ICD-10-CM

## 2024-06-05 NOTE — TELEPHONE ENCOUNTER
Spoke with patient she is currently not working due to her daughter type 1 diabetes diagnosis.     She is unable to pay for the supplies needed to check her sugars regularly copay per item is $45+ equaling out to over $300 which she does not have.    She is also in the process of applying for state insurance and adjusting her plan to reflect her lower income and hoping this will help pay for supplies.     Noted on 5/30 appointment to be awaiting  a new dexacom sensor, getting low on medications- these were not obtained due to high prices.

## 2024-06-05 NOTE — TELEPHONE ENCOUNTER
----- Message from Alondra Marin MD sent at 6/5/2024 10:12 AM EDT -----  Regarding: FW: uncontrolled GDM and non-compliant  Can we please reach out to this patient  ----- Message -----  From: ELIER Gray  Sent: 6/5/2024  10:00 AM EDT  To: ELIER Negron; #  Subject: uncontrolled GDM and non-compliant               Verito Bell, 31 3/7 weeks GDM, may have pre-existing diabetes with poorly controlled glucose readings and non-compliant with reporting glucose readings for insulin adjustments. Dexcom CGM shows last date of 5/27/2024 and no glucose readings reported via flowsheet.     Dexcom dates 5/14/2024 to 5/27/2024 shows GMI 7.6% with average glucose 180 mg/dL;   TIR  only 16% of the time and above 140 at 84% of the time.   Unknown current basal/bolus regimen.     Has upcoming OB appt 6/11/2024.   Any assistance with Verito will be greatly appreciated.    Thanks,  Jessica

## 2024-06-06 NOTE — TELEPHONE ENCOUNTER
Patient is aware of [facility] pricing and generic availability and discount codes with manufacture websites. In the process of adjusting state insurance to help with costs. Patient does not have  PCP- referral placed for PCP to establish care and talk with their case management regarding the help she needs for supplies.

## 2024-06-10 PROBLEM — Z3A.32 32 WEEKS GESTATION OF PREGNANCY: Status: ACTIVE | Noted: 2023-12-26

## 2024-06-11 ENCOUNTER — ULTRASOUND (OUTPATIENT)
Dept: PERINATAL CARE | Facility: OTHER | Age: 39
End: 2024-06-11
Payer: COMMERCIAL

## 2024-06-11 ENCOUNTER — ROUTINE PRENATAL (OUTPATIENT)
Dept: OBGYN CLINIC | Facility: MEDICAL CENTER | Age: 39
End: 2024-06-11
Payer: COMMERCIAL

## 2024-06-11 ENCOUNTER — TELEPHONE (OUTPATIENT)
Dept: OBGYN CLINIC | Facility: MEDICAL CENTER | Age: 39
End: 2024-06-11

## 2024-06-11 ENCOUNTER — APPOINTMENT (OUTPATIENT)
Dept: LAB | Facility: MEDICAL CENTER | Age: 39
End: 2024-06-11
Payer: COMMERCIAL

## 2024-06-11 VITALS
WEIGHT: 253.4 LBS | HEIGHT: 64 IN | HEART RATE: 97 BPM | BODY MASS INDEX: 43.26 KG/M2 | DIASTOLIC BLOOD PRESSURE: 80 MMHG | SYSTOLIC BLOOD PRESSURE: 118 MMHG

## 2024-06-11 VITALS
DIASTOLIC BLOOD PRESSURE: 76 MMHG | HEART RATE: 111 BPM | WEIGHT: 251 LBS | BODY MASS INDEX: 43.08 KG/M2 | OXYGEN SATURATION: 98 % | SYSTOLIC BLOOD PRESSURE: 108 MMHG

## 2024-06-11 DIAGNOSIS — O36.63X0 EXCESSIVE FETAL GROWTH AFFECTING MANAGEMENT OF PREGNANCY IN THIRD TRIMESTER, SINGLE OR UNSPECIFIED FETUS: ICD-10-CM

## 2024-06-11 DIAGNOSIS — Z3A.32 32 WEEKS GESTATION OF PREGNANCY: ICD-10-CM

## 2024-06-11 DIAGNOSIS — O35.BXX0 FETAL CARDIAC ANOMALY COMPLICATING PREGNANCY, ANTEPARTUM, SINGLE GESTATION: ICD-10-CM

## 2024-06-11 DIAGNOSIS — O24.913 DIABETES MELLITUS AFFECTING PREGNANCY IN THIRD TRIMESTER: Primary | ICD-10-CM

## 2024-06-11 DIAGNOSIS — O24.913 DIABETES MELLITUS AFFECTING PREGNANCY IN THIRD TRIMESTER: ICD-10-CM

## 2024-06-11 DIAGNOSIS — F12.10 TETRAHYDROCANNABINOL (THC) USE DISORDER, MILD, ABUSE: ICD-10-CM

## 2024-06-11 DIAGNOSIS — O09.523 MULTIGRAVIDA OF ADVANCED MATERNAL AGE IN THIRD TRIMESTER: Primary | ICD-10-CM

## 2024-06-11 DIAGNOSIS — O99.210 MATERNAL OBESITY, ANTEPARTUM: ICD-10-CM

## 2024-06-11 LAB
SL AMB  POCT GLUCOSE, UA: ABNORMAL
SL AMB POCT URINE PROTEIN: ABNORMAL

## 2024-06-11 PROCEDURE — 76818 FETAL BIOPHYS PROFILE W/NST: CPT | Performed by: OBSTETRICS & GYNECOLOGY

## 2024-06-11 PROCEDURE — PNV: Performed by: NURSE PRACTITIONER

## 2024-06-11 PROCEDURE — 81002 URINALYSIS NONAUTO W/O SCOPE: CPT | Performed by: NURSE PRACTITIONER

## 2024-06-11 PROCEDURE — 76816 OB US FOLLOW-UP PER FETUS: CPT | Performed by: OBSTETRICS & GYNECOLOGY

## 2024-06-11 PROCEDURE — 99214 OFFICE O/P EST MOD 30 MIN: CPT | Performed by: OBSTETRICS & GYNECOLOGY

## 2024-06-11 NOTE — PROGRESS NOTES
Non-Stress Testing:    Non-Stress test, equipment, procedure, and expected outcomes explained. Reviewed fetal kick counts and when to call OB.Verified patient understanding of fetal kick counts with teach back method. Patient reports feeling daily fetal movements. Patient has no questions or concerns.   NST reviewed by Dr. Mandel

## 2024-06-11 NOTE — ASSESSMENT & PLAN NOTE
TWG # 17.2 kg (38 lb)  Limit weight gain to 11-20 lbs.   Regular exercise encouraged.   Low dose  mg po daily encouraged to reduce risk of pre E and adverse Fetal growth scans every 4-6 weeks in 3rd trimester.

## 2024-06-11 NOTE — ASSESSMENT & PLAN NOTE
Demaddie Mata is a 39 y.o.  here for OB visit at 32w2d weeks. Arrived 11 minutes after appt start time.  TWG 17.2 kg (38 lb).  No health concerns. Accompanied by jailyn Sanchez.     Denies LOF, vaginal bleeding or contractions.   Performing FKC's daily 10 in 2 hours  28 week and preE labs are outstanding  TDAP received 24  MFM appt 24  Pediatric CPR  classes along with hospital tour recommended  RTO 2 weeks

## 2024-06-11 NOTE — PROGRESS NOTES
"Problem   Maternal Obesity, Antepartum    -Pre-pregnancy BMI 36.89. Weight 215 lbs.  -Current BMI 41.16. Current weight 239 lbs.  -TWG 24 lbs.  -Recommended weight gain 11 to 20 lbs.      Diabetes Mellitus Affecting Pregnancy in Third Trimester   Tetrahydrocannabinol (Thc) Use Disorder, Mild, Abuse   32 Weeks Gestation of Pregnancy     32 weeks gestation of pregnancy  Verito Mata is a 39 y.o.  here for OB visit at 32w2d weeks. Arrived 11 minutes after appt start time.  TWG 17.2 kg (38 lb).  No health concerns. Accompanied by jailyn Sanchez.     Denies LOF, vaginal bleeding or contractions.   Performing FKC's daily 10 in 2 hours  28 week and preE labs are outstanding  TDAP received 24  MFM appt 24  Pediatric CPR  classes along with hospital tour recommended  RTO 2 weeks    Diabetes mellitus affecting pregnancy in third trimester    Lab Results   Component Value Date    HGBA1C 7.0 (H) 2024   As per note from M staff:  \"Dexcom CGM shows last date of 2024 and no glucose readings reported via flowsheet.   Dexcom dates 2024 to 2024 shows GMI 7.6% with average glucose 180 mg/dL;   TIR  only 16% of the time and above 140 at 84% of the time.   Unknown current basal/bolus regimen. \"    Verito states her insulin has been cost prohibitive but now has state insurance and plans to be compliant with her diabetes management. Encouraged to reestablish with diabetes pathways.     Maternal obesity, antepartum  TWG # 17.2 kg (38 lb)  Limit weight gain to 11-20 lbs.   Regular exercise encouraged.   Low dose  mg po daily encouraged to reduce risk of pre E and adverse Fetal growth scans every 4-6 weeks in 3rd trimester.    Tetrahydrocannabinol (THC) use disorder, mild, abuse  Denies use since December.     "

## 2024-06-11 NOTE — ASSESSMENT & PLAN NOTE
"  Lab Results   Component Value Date    HGBA1C 7.0 (H) 02/05/2024   As per note from [facility] staff:  \"Dexcom CGM shows last date of 5/27/2024 and no glucose readings reported via flowsheet.   Dexcom dates 5/14/2024 to 5/27/2024 shows GMI 7.6% with average glucose 180 mg/dL;   TIR  only 16% of the time and above 140 at 84% of the time.   Unknown current basal/bolus regimen. \"    Loboverena states her insulin has been cost prohibitive but now has state insurance and plans to be compliant with her diabetes management. Encouraged to reestablish with diabetes pathways.   "

## 2024-06-11 NOTE — PROGRESS NOTES
The patient was seen today for an ultrasound and NST.  Please see ultrasound report (located under OB Procedures tab) for additional details.    On exam today the patient appears well, in no acute distress, and denies any complaints.     Fetal weight is at the 98th th%tile for gestational age; however, the Abdominal Circumference is measuring greater than the 99th%tile for gestational age. A large abdominal circumference can sometimes indicate macrosomia and is seen commonly in poorly controlled diabetes. The patient has poorly controlled type 2 diabetes with unknown recent A1c.  She currently reports taking 80 units of Lantus in the evening and 22 units of Humalog with breakfast, 20 units with lunch, and 22 units with dinner.  She has not been able to check her blood glucose in over 2 weeks.  She does not have supplies and is in the process of obtaining new supplies through her new insurance.  I offered her admission to labor and delivery for further evaluation and management of her blood sugars which I suspect are very high.  She reports fastings in the 140s to 190s weeks ago.  At this time she is unable to be admitted.  She may consider admission over the weekend.  Alternatively, she will do her best to obtain supplies to check her blood glucose values this week and report blood glucose values to our diabetes in pregnancy program.    Good fetal movement and tone are seen. The amniotic fluid volume appears normal.    The limitations of ultrasound in predicting birth weight were reviewed with the patient and all of her questions were answered to apparent satisfaction.  We discussed fetal kick counts.    Recommend the patient return for twice-weekly  surveillance.  Delivery may be considered between 37 and 38 weeks gestation given for the controlled type 2 diabetes with evolving macrosomia and history of shoulder dystocia.  Delivery by  section is strongly recommended.    Thank you very much for  allowing us to participate in the care of this very nice patient. Should you have any questions, please do not hesitate to contact our office.

## 2024-06-12 DIAGNOSIS — O99.810 HYPERGLYCEMIA IN PREGNANCY: Primary | ICD-10-CM

## 2024-06-12 DIAGNOSIS — O24.911 DIABETES MELLITUS AFFECTING PREGNANCY, FIRST TRIMESTER: ICD-10-CM

## 2024-06-12 DIAGNOSIS — O24.419 GESTATIONAL DIABETES MELLITUS (GDM) IN SECOND TRIMESTER, GESTATIONAL DIABETES METHOD OF CONTROL UNSPECIFIED: ICD-10-CM

## 2024-06-12 DIAGNOSIS — Z3A.14 14 WEEKS GESTATION OF PREGNANCY: ICD-10-CM

## 2024-06-12 DIAGNOSIS — O99.810 ABNORMAL GLUCOSE AFFECTING PREGNANCY: ICD-10-CM

## 2024-06-12 RX ORDER — INSULIN GLARGINE 100 [IU]/ML
80 INJECTION, SOLUTION SUBCUTANEOUS
Qty: 30 ML | Refills: 0 | Status: ON HOLD | OUTPATIENT
Start: 2024-06-12 | End: 2024-06-17 | Stop reason: ALTCHOICE

## 2024-06-13 ENCOUNTER — TELEPHONE (OUTPATIENT)
Dept: OBGYN CLINIC | Facility: CLINIC | Age: 39
End: 2024-06-13

## 2024-06-13 NOTE — TELEPHONE ENCOUNTER
----- Message from Alondra Marin MD sent at 2024  2:41 PM EDT -----  Regarding: RE: uncontrolled GDM and non-compliant  Elton or Skip- can you please reschedule the patients next visit to be with a doc so that we can discuss route of delivery and scheduling. Thank you!  ----- Message -----  From: Ton Mandel MD  Sent: 2024   2:25 PM EDT  To: ELIER Negron; #  Subject: RE: uncontrolled GDM and non-compliant           My notes from today    Fetal weight is at the 98th th%tile for gestational age; however, the Abdominal Circumference is measuring greater than the 99th%tile for gestational age. A large abdominal circumference can sometimes indicate macrosomia and is seen commonly in poorly controlled diabetes. The patient has poorly controlled type 2 diabetes with unknown recent A1c.  She currently reports taking 80 units of Lantus in the evening and 22 units of Humalog with breakfast, 20 units with lunch, and 22 units with dinner.  She has not been able to check her blood glucose in over 2 weeks.  She does not have supplies and is in the process of obtaining new supplies through her new insurance.  I offered her admission to labor and delivery for further evaluation and management of her blood sugars which I suspect are very high.  She reports fastings in the 140s to 190s weeks ago.  At this time she is unable to be admitted.  She may consider admission over the weekend.  Alternatively, she will do her best to obtain supplies to check her blood glucose values this week and report blood glucose values to our diabetes in pregnancy program.     Delivery may be considered between 37 and 38 weeks gestation given for the controlled type 2 diabetes with evolving macrosomia and history of shoulder dystocia.  Delivery by  section is strongly recommended.  ----- Message -----  From: Boby Davila MD  Sent: 2024   4:04 PM EDT  To: ELIER Negron; #  Subject: RE:  uncontrolled GDM and non-compliant           ThanksJessica    Her next appointment is 6/11 at [place] with Cam Connor  ----- Message -----  From: ELIER Gray  Sent: 6/5/2024  10:00 AM EDT  To: ELIER Negron; #  Subject: uncontrolled GDM and non-compliant               Verito Bell, 31 3/7 weeks GDM, may have pre-existing diabetes with poorly controlled glucose readings and non-compliant with reporting glucose readings for insulin adjustments. Dexcom CGM shows last date of 5/27/2024 and no glucose readings reported via flowsheet.     Dexcom dates 5/14/2024 to 5/27/2024 shows GMI 7.6% with average glucose 180 mg/dL;   TIR  only 16% of the time and above 140 at 84% of the time.   Unknown current basal/bolus regimen.     Has upcoming OB appt 6/11/2024.   Any assistance with Verito will be greatly appreciated.    Thanks,  Jessica   Yes

## 2024-06-14 ENCOUNTER — ROUTINE PRENATAL (OUTPATIENT)
Dept: PERINATAL CARE | Facility: OTHER | Age: 39
End: 2024-06-14
Payer: COMMERCIAL

## 2024-06-14 VITALS
WEIGHT: 255.4 LBS | DIASTOLIC BLOOD PRESSURE: 64 MMHG | SYSTOLIC BLOOD PRESSURE: 114 MMHG | HEIGHT: 64 IN | HEART RATE: 102 BPM | BODY MASS INDEX: 43.6 KG/M2

## 2024-06-14 DIAGNOSIS — O24.113 PRE-EXISTING TYPE 2 DIABETES MELLITUS WITH HYPERGLYCEMIA DURING PREGNANCY IN THIRD TRIMESTER (HCC): Primary | ICD-10-CM

## 2024-06-14 DIAGNOSIS — E11.65 PRE-EXISTING TYPE 2 DIABETES MELLITUS WITH HYPERGLYCEMIA DURING PREGNANCY IN THIRD TRIMESTER (HCC): Primary | ICD-10-CM

## 2024-06-14 PROCEDURE — 59025 FETAL NON-STRESS TEST: CPT | Performed by: OBSTETRICS & GYNECOLOGY

## 2024-06-14 NOTE — PROGRESS NOTES
Repeat Non-Stress Testing:    Patient verbalizes +FM. Pt denies ALL:               Leaking of fluid   Contractions   Vaginal bleeding   Decreased fetal movement    Patient is performing daily kick counts. Patient has no questions or concerns.   NST strip reviewed by Dr. Mcbride.

## 2024-06-15 PROBLEM — O24.113 PRE-EXISTING TYPE 2 DIABETES MELLITUS WITH HYPERGLYCEMIA DURING PREGNANCY IN THIRD TRIMESTER (HCC): Status: ACTIVE | Noted: 2024-01-29

## 2024-06-16 ENCOUNTER — NURSE TRIAGE (OUTPATIENT)
Dept: OTHER | Facility: OTHER | Age: 39
End: 2024-06-16

## 2024-06-16 NOTE — TELEPHONE ENCOUNTER
"Answer Assessment - Initial Assessment Questions  1. BLOOD GLUCOSE: \"What is your blood glucose level?\"       Highest BG range 260 today at noon. BG right now is 150 w/ Dexcom.     2. ONSET: \"When did you check the blood glucose?\"      Since yesterday.     3. USUAL RANGE: \"What is your glucose level usually?\" (e.g., usual fasting morning value, usual evening value)      Is up and down \"but never within range\".    4. KETONES: \"Do you check for ketones (urine or blood test strips)?\" If yes, ask: \"What does the test show now?\"       Denies    5. TYPE 1 or 2:  \"Do you know what type of diabetes you have?\"  (e.g., Type 1, Type 2, Gestational; doesn't know)       Type 2 DM- Says she was diagnosed with gestation DM during pregnancy.     6. INSULIN: \"Do you take insulin?\" \"What type of insulin(s) do you use? What is the mode of delivery? (syringe, pen; injection or pump)?\"       Humalog 26 units before every meal           Lantus 80 units at bedtime    7. DIABETES PILLS: \"Do you take any pills for your diabetes?\" If yes, ask: \"Have you missed taking any pills recently?\"      Denies    8. OTHER SYMPTOMS: \"Do you have any symptoms?\" (e.g., fever, frequent urination, difficulty breathing, dizziness, weakness, vomiting)      No symptoms at this time. No belly pain, leakage of vaginal fluid, no vaginal bleeding, and reports normal FM.     9. PREGNANCY: \"Is there any chance you are pregnant?\" \"When was your last menstrual period?\"      33 wks gest    Protocols used: Diabetes - High Blood Sugar-ADULT-[place]      Pt states she was told by her Maternal Fetal Med provider if she is having difficulty controlling her BG the she should be admitted to L&D.     Per on call- agrees pt should be seen tonight at [facility] [place].     Recommendation given to pt. States she is unable to come in tonight because she does not have a ride but can go tomorrow morning.     Per on call - if pt can only come in tomorrow morning to L&D that is fine.     Pt " aware and verbalized understanding. Said she would be in to L&D [place] first thing tomorrow morning.

## 2024-06-17 ENCOUNTER — HOSPITAL ENCOUNTER (INPATIENT)
Facility: HOSPITAL | Age: 39
LOS: 4 days | Discharge: HOME | End: 2024-06-21
Attending: OBSTETRICS & GYNECOLOGY | Admitting: STUDENT IN AN ORGANIZED HEALTH CARE EDUCATION/TRAINING PROGRAM
Payer: COMMERCIAL

## 2024-06-17 DIAGNOSIS — O24.113 PRE-EXISTING TYPE 2 DIABETES MELLITUS WITH HYPERGLYCEMIA DURING PREGNANCY IN THIRD TRIMESTER (HCC): Primary | ICD-10-CM

## 2024-06-17 DIAGNOSIS — E11.65 PRE-EXISTING TYPE 2 DIABETES MELLITUS WITH HYPERGLYCEMIA DURING PREGNANCY IN THIRD TRIMESTER (HCC): Primary | ICD-10-CM

## 2024-06-17 DIAGNOSIS — O24.911 DIABETES MELLITUS AFFECTING PREGNANCY, FIRST TRIMESTER: ICD-10-CM

## 2024-06-17 PROBLEM — Z3A.33 33 WEEKS GESTATION OF PREGNANCY: Status: ACTIVE | Noted: 2023-12-26

## 2024-06-17 PROBLEM — O09.299 H/O SHOULDER DYSTOCIA IN PRIOR PREGNANCY, CURRENTLY PREGNANT: Status: ACTIVE | Noted: 2024-06-17

## 2024-06-17 PROBLEM — Z3A.33 33 WEEKS GESTATION OF PREGNANCY: Chronic | Status: ACTIVE | Noted: 2023-12-26

## 2024-06-17 PROBLEM — R03.0 ELEVATED BLOOD PRESSURE READING WITHOUT DIAGNOSIS OF HYPERTENSION: Status: ACTIVE | Noted: 2024-06-17

## 2024-06-17 PROBLEM — Z87.891 HISTORY OF SMOKING: Status: ACTIVE | Noted: 2024-06-17

## 2024-06-17 LAB
ABO GROUP BLD: NORMAL
ALBUMIN SERPL BCG-MCNC: 3.1 G/DL (ref 3.5–5)
ALP SERPL-CCNC: 102 U/L (ref 34–104)
ALT SERPL W P-5'-P-CCNC: 9 U/L (ref 7–52)
ANION GAP SERPL CALCULATED.3IONS-SCNC: 8 MMOL/L (ref 4–13)
AST SERPL W P-5'-P-CCNC: 9 U/L (ref 13–39)
B-OH-BUTYR SERPL-MCNC: <0.05 MMOL/L (ref 0.02–0.27)
BACTERIA UR QL AUTO: ABNORMAL /HPF
BASOPHILS # BLD AUTO: 0.02 THOUSANDS/ÂΜL (ref 0–0.1)
BASOPHILS NFR BLD AUTO: 0 % (ref 0–1)
BILIRUB SERPL-MCNC: 0.21 MG/DL (ref 0.2–1)
BILIRUB UR QL STRIP: NEGATIVE
BLD GP AB SCN SERPL QL: NEGATIVE
BUN SERPL-MCNC: 8 MG/DL (ref 5–25)
CALCIUM ALBUM COR SERPL-MCNC: 9.6 MG/DL (ref 8.3–10.1)
CALCIUM SERPL-MCNC: 8.9 MG/DL (ref 8.4–10.2)
CHLORIDE SERPL-SCNC: 105 MMOL/L (ref 96–108)
CLARITY UR: ABNORMAL
CO2 SERPL-SCNC: 21 MMOL/L (ref 21–32)
COLOR UR: ABNORMAL
CREAT SERPL-MCNC: 0.48 MG/DL (ref 0.6–1.3)
CREAT UR-MCNC: 105.4 MG/DL
EOSINOPHIL # BLD AUTO: 0.07 THOUSAND/ÂΜL (ref 0–0.61)
EOSINOPHIL NFR BLD AUTO: 1 % (ref 0–6)
ERYTHROCYTE [DISTWIDTH] IN BLOOD BY AUTOMATED COUNT: 16.5 % (ref 11.6–15.1)
EST. AVERAGE GLUCOSE BLD GHB EST-MCNC: 169 MG/DL
GFR SERPL CREATININE-BSD FRML MDRD: 125 ML/MIN/1.73SQ M
GLUCOSE SERPL-MCNC: 153 MG/DL (ref 65–140)
GLUCOSE SERPL-MCNC: 197 MG/DL (ref 65–140)
GLUCOSE SERPL-MCNC: 230 MG/DL (ref 65–140)
GLUCOSE SERPL-MCNC: 261 MG/DL (ref 65–140)
GLUCOSE UR STRIP-MCNC: ABNORMAL MG/DL
HBA1C MFR BLD: 7.5 %
HCT VFR BLD AUTO: 37.4 % (ref 34.8–46.1)
HGB BLD-MCNC: 12.3 G/DL (ref 11.5–15.4)
HGB UR QL STRIP.AUTO: NEGATIVE
IMM GRANULOCYTES # BLD AUTO: 0.06 THOUSAND/UL (ref 0–0.2)
IMM GRANULOCYTES NFR BLD AUTO: 1 % (ref 0–2)
KETONES UR STRIP-MCNC: NEGATIVE MG/DL
LEUKOCYTE ESTERASE UR QL STRIP: ABNORMAL
LYMPHOCYTES # BLD AUTO: 1.77 THOUSANDS/ÂΜL (ref 0.6–4.47)
LYMPHOCYTES NFR BLD AUTO: 19 % (ref 14–44)
MCH RBC QN AUTO: 27 PG (ref 26.8–34.3)
MCHC RBC AUTO-ENTMCNC: 32.9 G/DL (ref 31.4–37.4)
MCV RBC AUTO: 82 FL (ref 82–98)
MONOCYTES # BLD AUTO: 0.39 THOUSAND/ÂΜL (ref 0.17–1.22)
MONOCYTES NFR BLD AUTO: 4 % (ref 4–12)
MUCOUS THREADS UR QL AUTO: ABNORMAL
NEUTROPHILS # BLD AUTO: 7.27 THOUSANDS/ÂΜL (ref 1.85–7.62)
NEUTS SEG NFR BLD AUTO: 75 % (ref 43–75)
NITRITE UR QL STRIP: NEGATIVE
NON-SQ EPI CELLS URNS QL MICRO: ABNORMAL /HPF
NRBC BLD AUTO-RTO: 0 /100 WBCS
PH UR STRIP.AUTO: 6 [PH]
PLATELET # BLD AUTO: 208 THOUSANDS/UL (ref 149–390)
PMV BLD AUTO: 11.1 FL (ref 8.9–12.7)
POTASSIUM SERPL-SCNC: 3.7 MMOL/L (ref 3.5–5.3)
PROT SERPL-MCNC: 6.2 G/DL (ref 6.4–8.4)
PROT UR STRIP-MCNC: ABNORMAL MG/DL
PROT UR-MCNC: 31 MG/DL
PROT/CREAT UR: 0.29 MG/G{CREAT} (ref 0–0.1)
RBC # BLD AUTO: 4.56 MILLION/UL (ref 3.81–5.12)
RBC #/AREA URNS AUTO: ABNORMAL /HPF
RH BLD: POSITIVE
SODIUM SERPL-SCNC: 134 MMOL/L (ref 135–147)
SP GR UR STRIP.AUTO: 1.02 (ref 1–1.03)
SPECIMEN EXPIRATION DATE: NORMAL
TREPONEMA PALLIDUM IGG+IGM AB [PRESENCE] IN SERUM OR PLASMA BY IMMUNOASSAY: NORMAL
UROBILINOGEN UR STRIP-ACNC: <2 MG/DL
WBC # BLD AUTO: 9.58 THOUSAND/UL (ref 4.31–10.16)
WBC #/AREA URNS AUTO: ABNORMAL /HPF

## 2024-06-17 PROCEDURE — 83036 HEMOGLOBIN GLYCOSYLATED A1C: CPT

## 2024-06-17 PROCEDURE — 86850 RBC ANTIBODY SCREEN: CPT

## 2024-06-17 PROCEDURE — 85025 COMPLETE CBC W/AUTO DIFF WBC: CPT

## 2024-06-17 PROCEDURE — 80053 COMPREHEN METABOLIC PANEL: CPT

## 2024-06-17 PROCEDURE — 86780 TREPONEMA PALLIDUM: CPT

## 2024-06-17 PROCEDURE — 82570 ASSAY OF URINE CREATININE: CPT

## 2024-06-17 PROCEDURE — NC001 PR NO CHARGE: Performed by: OBSTETRICS & GYNECOLOGY

## 2024-06-17 PROCEDURE — 87086 URINE CULTURE/COLONY COUNT: CPT

## 2024-06-17 PROCEDURE — 82010 KETONE BODYS QUAN: CPT

## 2024-06-17 PROCEDURE — 86900 BLOOD TYPING SEROLOGIC ABO: CPT

## 2024-06-17 PROCEDURE — 59025 FETAL NON-STRESS TEST: CPT | Performed by: OBSTETRICS & GYNECOLOGY

## 2024-06-17 PROCEDURE — 99213 OFFICE O/P EST LOW 20 MIN: CPT

## 2024-06-17 PROCEDURE — 86901 BLOOD TYPING SEROLOGIC RH(D): CPT

## 2024-06-17 PROCEDURE — 82948 REAGENT STRIP/BLOOD GLUCOSE: CPT

## 2024-06-17 PROCEDURE — 81001 URINALYSIS AUTO W/SCOPE: CPT

## 2024-06-17 PROCEDURE — NC001 PR NO CHARGE: Performed by: STUDENT IN AN ORGANIZED HEALTH CARE EDUCATION/TRAINING PROGRAM

## 2024-06-17 PROCEDURE — 84156 ASSAY OF PROTEIN URINE: CPT

## 2024-06-17 PROCEDURE — 87150 DNA/RNA AMPLIFIED PROBE: CPT

## 2024-06-17 PROCEDURE — 4A1HXCZ MONITORING OF PRODUCTS OF CONCEPTION, CARDIAC RATE, EXTERNAL APPROACH: ICD-10-PCS | Performed by: OBSTETRICS & GYNECOLOGY

## 2024-06-17 RX ORDER — ASPIRIN 81 MG/1
162 TABLET, CHEWABLE ORAL DAILY
Status: DISCONTINUED | OUTPATIENT
Start: 2024-06-17 | End: 2024-06-17

## 2024-06-17 RX ORDER — INSULIN GLARGINE 100 [IU]/ML
80 INJECTION, SOLUTION SUBCUTANEOUS
Status: DISCONTINUED | OUTPATIENT
Start: 2024-06-17 | End: 2024-06-17

## 2024-06-17 RX ORDER — ACETAMINOPHEN 325 MG/1
975 TABLET ORAL EVERY 8 HOURS PRN
Status: DISCONTINUED | OUTPATIENT
Start: 2024-06-17 | End: 2024-06-21 | Stop reason: HOSPADM

## 2024-06-17 RX ORDER — INSULIN LISPRO 100 [IU]/ML
32 INJECTION, SOLUTION INTRAVENOUS; SUBCUTANEOUS
Status: DISCONTINUED | OUTPATIENT
Start: 2024-06-17 | End: 2024-06-18

## 2024-06-17 RX ORDER — INSULIN GLARGINE 100 [IU]/ML
96 INJECTION, SOLUTION SUBCUTANEOUS
Status: DISCONTINUED | OUTPATIENT
Start: 2024-06-17 | End: 2024-06-18

## 2024-06-17 RX ORDER — LANOLIN ALCOHOL/MO/W.PET/CERES
3 CREAM (GRAM) TOPICAL
Status: DISCONTINUED | OUTPATIENT
Start: 2024-06-17 | End: 2024-06-21 | Stop reason: HOSPADM

## 2024-06-17 RX ORDER — HYDROXYZINE HYDROCHLORIDE 25 MG/1
25 TABLET, FILM COATED ORAL EVERY 6 HOURS PRN
Status: DISCONTINUED | OUTPATIENT
Start: 2024-06-17 | End: 2024-06-21 | Stop reason: HOSPADM

## 2024-06-17 RX ORDER — INSULIN LISPRO 100 [IU]/ML
26 INJECTION, SOLUTION INTRAVENOUS; SUBCUTANEOUS
Status: DISCONTINUED | OUTPATIENT
Start: 2024-06-17 | End: 2024-06-17

## 2024-06-17 RX ORDER — CALCIUM CARBONATE 500 MG/1
1000 TABLET, CHEWABLE ORAL 3 TIMES DAILY PRN
Status: DISCONTINUED | OUTPATIENT
Start: 2024-06-17 | End: 2024-06-21 | Stop reason: HOSPADM

## 2024-06-17 RX ORDER — ONDANSETRON 2 MG/ML
4 INJECTION INTRAMUSCULAR; INTRAVENOUS EVERY 6 HOURS PRN
Status: DISCONTINUED | OUTPATIENT
Start: 2024-06-17 | End: 2024-06-21 | Stop reason: HOSPADM

## 2024-06-17 RX ORDER — ASPIRIN 81 MG/1
162 TABLET, CHEWABLE ORAL
Status: DISCONTINUED | OUTPATIENT
Start: 2024-06-17 | End: 2024-06-21 | Stop reason: HOSPADM

## 2024-06-17 RX ADMIN — INSULIN GLARGINE 96 UNITS: 100 INJECTION, SOLUTION SUBCUTANEOUS at 22:22

## 2024-06-17 RX ADMIN — INSULIN LISPRO 32 UNITS: 100 INJECTION, SOLUTION INTRAVENOUS; SUBCUTANEOUS at 18:07

## 2024-06-17 RX ADMIN — INSULIN LISPRO 32 UNITS: 100 INJECTION, SOLUTION INTRAVENOUS; SUBCUTANEOUS at 13:34

## 2024-06-17 RX ADMIN — ASPIRIN 81 MG 162 MG: 81 TABLET ORAL at 22:22

## 2024-06-17 RX ADMIN — Medication 1 TABLET: at 22:22

## 2024-06-17 NOTE — ASSESSMENT & PLAN NOTE
Fetal tracings reactive  Prenatal labs normal  No obstetric complaints    NST TID   Continue low dose aspirin for preeclampsia prevention  Daily PNV

## 2024-06-17 NOTE — ASSESSMENT & PLAN NOTE
Lab Results   Component Value Date    HGBA1C 7.5 (H) 06/17/2024       Recent Labs     06/20/24 2050 06/20/24  2213 06/21/24  0230 06/21/24  0643   POCGLU 85 128 84 123       Blood Sugar Average: Last 72 hrs:  (P) 125    Home Dexacom: for 7d PTA, sugars are at goal 31% of the time and above goal the rest of the time (never below goal)    Home regimen PTA:  - Lantus 80 U qhs  - Humalog 26 U TID w/ meals    6/17: Lantus increased to 96 U qhs, Humalog increased to 32 U TID w/ meals  6/18: Lantus increased to 110 U qhs, Humalog increased to 40 U TID w/ meals  6/19: Lantus increased to 132 U qhs  6/21: Humalog decreased to 32 U TID w/ meals  CBC, CMP, beta hydroxybutyrate, & UA without evidence of DKA w/ AG 8    Recommend continued use of Dexacom inpatient  Recommend addition of POCT glucose fasting & 2 hr post-prandial  Consider insulin as above - adjust as needed

## 2024-06-17 NOTE — ASSESSMENT & PLAN NOTE
Initially used THC and tobacco in early pregnancy, but discontinue both when she found out she was pregnant  Denies any use of THC or other drugs during pregnancy afterwards.

## 2024-06-17 NOTE — ASSESSMENT & PLAN NOTE
Level I US on 6/11/24 at 32w2d:  Transverse presentation  Anterior placenta  SAKINA 12.1 cm  EFW 5lb 9oz (98%), AC >99%, HC 13%

## 2024-06-17 NOTE — ASSESSMENT & PLAN NOTE
Pre-gravid BMI 36  Current BMI 43  TWG 17.4 kg (38 lb 6.4 oz)    SCDs & ambulation for DVT ppx  Recommend weekly weights to track weight gain in pregnancy  Recommended weight gain 11-20 lb

## 2024-06-17 NOTE — ASSESSMENT & PLAN NOTE
H/o shoulder dystocia in prior pregnancy with 7 lb fetus  Patient counseled that she is at increased risk for shoulder dystocia this pregnancy due to h/o shoulder dystocia, T2DM, suspected FGR w/ AC > HC    Monitor fetal growth & biometry this pregnancy  Highly anticipate need for  given high risk for shoulder dystocia this pregnancy (this can be re-evaluated pending GA & growth, though at 34w, this fetus is projected to be the same size of her prior fetus with whom she had a shoulder dystocia)

## 2024-06-17 NOTE — ASSESSMENT & PLAN NOTE
Overview:  Labs  Pap smear none on file; GC/CT neg  Prenatal panel notable for elevated 1 hr GTT  28w labs pending (collected on admission)  Vaccines:  Flu vaccine: due this flu season  Tdap vaccine: 24  Genetic screening: none completed  Contraception: Depo Provera  Feeding plan: breast & bottle  Birth plan:   Delivery consent: signed 24    Plan:  Continue LDASA  Daily PNV  FEN: diabetic diet  DVT ppx: SCDs  Monitoring: NST daily

## 2024-06-17 NOTE — PLAN OF CARE
Problem: ANTEPARTUM  Goal: Maintain pregnancy as long as maternal and/or fetal condition is stable  Description: INTERVENTIONS:  - Maternal surveillance  - Fetal surveillance  - Monitor uterine activity  - Medications as ordered  - Bedrest  Outcome: Progressing     Problem: PAIN - ADULT  Goal: Verbalizes/displays adequate comfort level or baseline comfort level  Description: Interventions:  - Encourage patient to monitor pain and request assistance  - Assess pain using appropriate pain scale  - Administer analgesics based on type and severity of pain and evaluate response  - Implement non-pharmacological measures as appropriate and evaluate response  - Consider cultural and social influences on pain and pain management  - Notify physician/advanced practitioner if interventions unsuccessful or patient reports new pain  Outcome: Progressing     Problem: INFECTION - ADULT  Goal: Absence or prevention of progression during hospitalization  Description: INTERVENTIONS:  - Assess and monitor for signs and symptoms of infection  - Monitor lab/diagnostic results  - Monitor all insertion sites, i.e. indwelling lines, tubes, and drains  - Monitor endotracheal if appropriate and nasal secretions for changes in amount and color  - [place] appropriate cooling/warming therapies per order  - Administer medications as ordered  - Instruct and encourage patient and family to use good hand hygiene technique  - Identify and instruct in appropriate isolation precautions for identified infection/condition  Outcome: Progressing  Goal: Absence of fever/infection during neutropenic period  Description: INTERVENTIONS:  - Monitor WBC    Outcome: Progressing     Problem: SAFETY ADULT  Goal: Patient will remain free of falls  Description: INTERVENTIONS:  - Educate patient/family on patient safety including physical limitations  - Instruct patient to call for assistance with activity   - Consult OT/PT to assist with strengthening/mobility   -  Keep Call bell within reach  - Keep bed low and locked with side rails adjusted as appropriate  - Keep care items and personal belongings within reach  - Initiate and maintain comfort rounds  - Make Fall Risk Sign visible to staff    Outcome: Progressing  Goal: Maintain or return to baseline ADL function  Description: INTERVENTIONS:  -  Assess patient's ability to carry out ADLs; assess patient's baseline for ADL function and identify physical deficits which impact ability to perform ADLs (bathing, care of mouth/teeth, toileting, grooming, dressing, etc.)  - Assess/evaluate cause of self-care deficits   - Assess range of motion  - Assess patient's mobility; develop plan if impaired  - Assess patient's need for assistive devices and provide as appropriate  - Encourage maximum independence but intervene and supervise when necessary  - Involve family in performance of ADLs  - Assess for home care needs following discharge   - Consider OT consult to assist with ADL evaluation and planning for discharge  - Provide patient education as appropriate  Outcome: Progressing  Goal: Maintains/Returns to pre admission functional level  Description: INTERVENTIONS:  - Perform AM-PAC 6 Click Basic Mobility/ Daily Activity assessment daily.  - Set and communicate daily mobility goal to care team and patient/family/caregiver.   - Collaborate with rehabilitation services on mobility goals if consulted    - Out of bed for toileting  - Record patient progress and toleration of activity level   Outcome: Progressing     Problem: Knowledge Deficit  Goal: Patient/family/caregiver demonstrates understanding of disease process, treatment plan, medications, and discharge instructions  Description: Complete learning assessment and assess knowledge base.  Interventions:  - Provide teaching at level of understanding  - Provide teaching via preferred learning methods  Outcome: Progressing     Problem: DISCHARGE PLANNING  Goal: Discharge to home or  other facility with appropriate resources  Description: INTERVENTIONS:  - Identify barriers to discharge w/patient and caregiver  - Arrange for needed discharge resources and transportation as appropriate  - Identify discharge learning needs (meds, wound care, etc.)  - Arrange for interpretive services to assist at discharge as needed  - Refer to Case Management Department for coordinating discharge planning if the patient needs post-hospital services based on physician/advanced practitioner order or complex needs related to functional status, cognitive ability, or social support system  Outcome: Progressing     Problem: Nutrition/Hydration-ADULT  Goal: Nutrient/Hydration intake appropriate for improving, restoring or maintaining nutritional needs  Description: Monitor and assess patient's nutrition/hydration status for malnutrition. Collaborate with interdisciplinary team and initiate plan and interventions as ordered.  Monitor patient's weight and dietary intake as ordered or per policy. Utilize nutrition screening tool and intervene as necessary. Determine patient's food preferences and provide high-protein, high-caloric foods as appropriate.     INTERVENTIONS:  - Monitor oral intake, urinary output, labs, and treatment plans  - Assess nutrition and hydration status and recommend course of action  - Evaluate amount of meals eaten  - Assist patient with eating if necessary   - Allow adequate time for meals  - Recommend/ encourage appropriate diets, oral nutritional supplements, and vitamin/mineral supplements  - Order, calculate, and assess calorie counts as needed  - Recommend, monitor, and adjust tube feedings and TPN/PPN based on assessed needs  - Assess need for intravenous fluids  - Provide specific nutrition/hydration education as appropriate  - Include patient/family/caregiver in decisions related to nutrition  Outcome: Progressing     Problem: GASTROINTESTINAL - ADULT  Goal: Maintains adequate nutritional  intake  Description: INTERVENTIONS:  - Monitor percentage of each meal consumed  - Identify factors contributing to decreased intake, treat as appropriate  - Assist with meals as needed  - Monitor I&O, weight, and lab values if indicated  - Obtain nutrition services referral as needed  Outcome: Progressing     Problem: METABOLIC, FLUID AND ELECTROLYTES - ADULT  Goal: Glucose maintained within target range  Description: INTERVENTIONS:  - Monitor Blood Glucose as ordered  - Assess for signs and symptoms of hyperglycemia and hypoglycemia  - Administer ordered medications to maintain glucose within target range  - Assess nutritional intake and initiate nutrition service referral as needed  Outcome: Progressing

## 2024-06-17 NOTE — ASSESSMENT & PLAN NOTE
Single elevated BP of 144/84 at 30w.  Systolic (24hrs), Av , Min:115 , Max:133   Diastolic (24hrs), Av, Min:62, Max:76    Monitor BP this pregnancy

## 2024-06-17 NOTE — H&P
H & P- Obstetrics   Verito Mata 39 y.o. female MRN: [id number]  Unit/Bed#: -01 Encounter: [id number]    Assessment: 39 y.o.  at 33w1d admitted for persistent hyperglycemia in setting of uncontrolled T2DM. She is asymptomatic, and has no obstetric complaints.      Plan:   Excessive fetal growth affecting management of pregnancy in third trimester  Assessment & Plan  EFW on  was 98%  Concern for fetal macrosomia    Tetrahydrocannabinol (THC) use disorder, mild, abuse  Assessment & Plan  Initially used THC and tobacco in early pregnancy, but discontinue both when she found out she was pregnant  Denies any use of THC or other drugs during pregnancy afterwards.     33 weeks gestation of pregnancy  Assessment & Plan  Fetal tracings reactive  Prenatal labs normal  No obstetric complaints    NST TID   Continue low dose aspirin for preeclampsia prevention  Daily PNV    * Pre-existing type 2 diabetes mellitus with hyperglycemia during pregnancy in third trimester (HCC)  Assessment & Plan  Lab Results   Component Value Date    HGBA1C 7.0 (H) 2024       Recent Labs     24  0934   POCGLU 261*       Blood Sugar Average: Last 72 hrs:  (P) 261    -Humalog dose recently increased to 26 units before every meal.   -Lantus 80 units nightly  -Ranges between 190s-300s despite increase in insulin dosing  -POC glucose 261 2 hours after   -CBC, CMP, Beta Hydroxybutryate and UA ordered to rule out DKA    Continue home insulin as above, and adjust as needed  Fasting and post prandial Fingerstick glucose checks            Discussed case and plan w/ Dr. Ngo      Chief Complaint: elevated blood sugar    HPI: Verito Mata is a 39 y.o.  with an VIBHA of 2024, by Ultrasound at 33w1d who is being admitted for persistent hyperglycemia in setting of uncontrolled T2DM. She was recommended to come in for evaluation for persistent BS levels in 190s-300s, despite insulin use. Her insulin regimen was  recently increased. She denies having uterine contractions, has no LOF, and reports no VB. She states she has felt good FM.    Patient Active Problem List   Diagnosis    33 weeks gestation of pregnancy    Nausea/vomiting in pregnancy    Tetrahydrocannabinol (THC) use disorder, mild, abuse    Multigravida of advanced maternal age in third trimester    Obesity complicating pregnancy, childbirth, or puerperium, antepartum    Pre-existing type 2 diabetes mellitus with hyperglycemia during pregnancy in third trimester ([facility])    Family history of diabetes mellitus in mother    Maternal obesity, antepartum    BMI 40.0-44.9, adult ([facility])    Hyperglycemia in pregnancy    Excessive fetal growth affecting management of pregnancy in third trimester    Elevated blood pressure reading without diagnosis of hypertension       Baby complications/comments: fetal macrosomia    Review of Systems   Constitutional:  Negative for fever.   HENT:  Negative for ear pain and sore throat.    Eyes:  Negative for visual disturbance.   Respiratory:  Negative for cough and shortness of breath.    Cardiovascular:  Negative for chest pain and palpitations.   Gastrointestinal:  Negative for abdominal pain and vomiting.   Genitourinary:  Negative for dysuria and hematuria.   Musculoskeletal:  Negative for arthralgias and back pain.   Skin:  Negative for color change and rash.   Neurological:  Negative for seizures, syncope, light-headedness and headaches.   All other systems reviewed and are negative.      OB Hx:  OB History    Para Term  AB Living   7 2 2 0 4 2   SAB IAB Ectopic Multiple Live Births   3 0 1 0 2      # Outcome Date GA Lbr Amaury/2nd Weight Sex Type Anes PTL Lv   7 Current            6 SAB 2020 10w0d    SAB      5 SAB 2020     SAB      4 SAB      SAB      3 Ectopic 2015     ECTOPIC      2 Term 13 40w0d  3175 g (7 lb) F Vag-Spont EPI  TERRELL      Complications: Shoulder Dystocia   1 Term 06 40w0d  3175 g (7  lb) M Vag-Spont EPI  TERRELL       Past Medical Hx:  Past Medical History:   Diagnosis Date    Anxiety     Depression     Ectopic pregnancy 2015    Miscarriage     2018, 2020 x 2    Type 2 diabetes mellitus affecting pregnancy in second trimester, antepartum 2024       Past Surgical hx:  Past Surgical History:   Procedure Laterality Date    APPENDECTOMY  2014    DIAGNOSTIC LAPAROSCOPY Left 2015    ectopic- left (tube was cut, not removed per pt)    MULTIPLE TOOTH EXTRACTIONS  2006    all teeth removed       Social Hx:  Alcohol use: none  Tobacco use: none  Other substance use: none      No Known Allergies      Medications Prior to Admission:     aspirin 81 mg chewable tablet    Blood Glucose Monitoring Suppl (Contour Next EZ) w/Device KIT    Continuous Blood Gluc  (Dexcom G7 ) SIGRID    Continuous Glucose Sensor (Dexcom G7 Sensor)    Contour Next Test test strip    insulin lispro (HumaLOG KwikPen) 100 units/mL injection pen    Insulin Pen Needle 31G X 5 MM MISC    Prenatal Vit w/Za-Gnrkgenxn-WX (PNV PO)    Lantus SoloStar 100 units/mL SOPN    Microlet Lancets MISC    Objective:  Temp:  [98.4 °F (36.9 °C)] 98.4 °F (36.9 °C)  HR:  [] 98  Resp:  [18] 18  BP: (114-119)/(62-72) 116/66  Body mass index is 43.5 kg/m².     Physical Exam:  Physical Exam  Constitutional:       General: She is not in acute distress.     Appearance: Normal appearance. She is not ill-appearing.   HENT:      Head: Normocephalic and atraumatic.   Eyes:      Pupils: Pupils are equal, round, and reactive to light.   Cardiovascular:      Rate and Rhythm: Normal rate.   Pulmonary:      Effort: Pulmonary effort is normal.   Abdominal:      Tenderness: There is no abdominal tenderness.   Neurological:      General: No focal deficit present.      Mental Status: She is alert and oriented to person, place, and time.   Skin:     General: Skin is warm and dry.   Vitals and nursing note reviewed.            FHT:  Baseline Rate (FHR): 135  bpm  Variability: Moderate  Accelerations: 15 x 15 or greater  Decelerations: None  Interpretation: NST reactive    TOCO:   Contraction Frequency (minutes): none    Lab Results   Component Value Date    WBC 9.58 06/17/2024    HGB 12.3 06/17/2024    HCT 37.4 06/17/2024     06/17/2024     Lab Results   Component Value Date    K 3.7 06/17/2024     06/17/2024    CO2 21 06/17/2024    BUN 8 06/17/2024    CREATININE 0.48 (L) 06/17/2024    AST 9 (L) 06/17/2024    ALT 9 06/17/2024     Prenatal Labs: Reviewed      Blood type: O positive  Antibody: negative  GBS: pending  HIV: non-reactive  Rubella: immune  Syphilis IgM/IgG: non-reactive  HBsAg: non-reactive  HCAb: non-reactive  Chlamydia: negative  Gonorrhea: negative  Diabetes 1 hour screen: 285  3 hour glucose: not indicated  Platelets: 269  Hgb: 14.6  >2 Midnights  INPATIENT     Signature/Title: Ry Lagunas MD  Date: 6/17/2024  Time: 12:17 PM

## 2024-06-17 NOTE — ASSESSMENT & PLAN NOTE
Lab Results   Component Value Date    HGBA1C 7.0 (H) 02/05/2024       Recent Labs     06/17/24  0934   POCGLU 261*       Blood Sugar Average: Last 72 hrs:  (P) 261    -Humalog dose recently increased to 26 units before every meal.   -Lantus 80 units nightly  -Ranges between 190s-300s despite increase in insulin dosing  -POC glucose 261 2 hours after   -CBC, CMP, Beta Hydroxybutryate and UA ordered to rule out DKA    Continue home insulin as above, and adjust as needed  Fasting and post prandial Fingerstick glucose checks

## 2024-06-17 NOTE — PLAN OF CARE
Problem: ANTEPARTUM  Goal: Maintain pregnancy as long as maternal and/or fetal condition is stable  Description: INTERVENTIONS:  - Maternal surveillance  - Fetal surveillance  - Monitor uterine activity  - Medications as ordered  - Bedrest  Outcome: Progressing     Problem: PAIN - ADULT  Goal: Verbalizes/displays adequate comfort level or baseline comfort level  Description: Interventions:  - Encourage patient to monitor pain and request assistance  - Assess pain using appropriate pain scale  - Administer analgesics based on type and severity of pain and evaluate response  - Implement non-pharmacological measures as appropriate and evaluate response  - Consider cultural and social influences on pain and pain management  - Notify physician/advanced practitioner if interventions unsuccessful or patient reports new pain  Outcome: Progressing     Problem: INFECTION - ADULT  Goal: Absence or prevention of progression during hospitalization  Description: INTERVENTIONS:  - Assess and monitor for signs and symptoms of infection  - Monitor lab/diagnostic results  - Monitor all insertion sites, i.e. indwelling lines, tubes, and drains  - Monitor endotracheal if appropriate and nasal secretions for changes in amount and color  - [place] appropriate cooling/warming therapies per order  - Administer medications as ordered  - Instruct and encourage patient and family to use good hand hygiene technique  - Identify and instruct in appropriate isolation precautions for identified infection/condition  Outcome: Progressing  Goal: Absence of fever/infection during neutropenic period  Description: INTERVENTIONS:  - Monitor WBC    Outcome: Progressing     Problem: SAFETY ADULT  Goal: Patient will remain free of falls  Description: INTERVENTIONS:  - Educate patient/family on patient safety including physical limitations  - Instruct patient to call for assistance with activity   - Consult OT/PT to assist with strengthening/mobility   -  Keep Call bell within reach  - Keep bed low and locked with side rails adjusted as appropriate  - Keep care items and personal belongings within reach  - Initiate and maintain comfort rounds  - Make Fall Risk Sign visible to staff  - Offer Toileting every  Hours, in advance of need  - Initiate/Maintain alarm  - Obtain necessary fall risk management equipment:   - Apply yellow socks and bracelet for high fall risk patients  - Consider moving patient to room near nurses station  Outcome: Progressing  Goal: Maintain or return to baseline ADL function  Description: INTERVENTIONS:  -  Assess patient's ability to carry out ADLs; assess patient's baseline for ADL function and identify physical deficits which impact ability to perform ADLs (bathing, care of mouth/teeth, toileting, grooming, dressing, etc.)  - Assess/evaluate cause of self-care deficits   - Assess range of motion  - Assess patient's mobility; develop plan if impaired  - Assess patient's need for assistive devices and provide as appropriate  - Encourage maximum independence but intervene and supervise when necessary  - Involve family in performance of ADLs  - Assess for home care needs following discharge   - Consider OT consult to assist with ADL evaluation and planning for discharge  - Provide patient education as appropriate  Outcome: Progressing  Goal: Maintains/Returns to pre admission functional level  Description: INTERVENTIONS:  - Perform AM-PAC 6 Click Basic Mobility/ Daily Activity assessment daily.  - Set and communicate daily mobility goal to care team and patient/family/caregiver.   - Collaborate with rehabilitation services on mobility goals if consulted  - Perform Range of Motion  times a day.  - Reposition patient every  hours.  - Dangle patient  times a day  - Stand patient  times a day  - Ambulate patient  times a day  - Out of bed to chair  times a day   - Out of bed for meals times a day  - Out of bed for toileting  - Record patient  progress and toleration of activity level   Outcome: Progressing     Problem: Knowledge Deficit  Goal: Patient/family/caregiver demonstrates understanding of disease process, treatment plan, medications, and discharge instructions  Description: Complete learning assessment and assess knowledge base.  Interventions:  - Provide teaching at level of understanding  - Provide teaching via preferred learning methods  Outcome: Progressing     Problem: DISCHARGE PLANNING  Goal: Discharge to home or other facility with appropriate resources  Description: INTERVENTIONS:  - Identify barriers to discharge w/patient and caregiver  - Arrange for needed discharge resources and transportation as appropriate  - Identify discharge learning needs (meds, wound care, etc.)  - Arrange for interpretive services to assist at discharge as needed  - Refer to Case Management Department for coordinating discharge planning if the patient needs post-hospital services based on physician/advanced practitioner order or complex needs related to functional status, cognitive ability, or social support system  Outcome: Progressing     Problem: Nutrition/Hydration-ADULT  Goal: Nutrient/Hydration intake appropriate for improving, restoring or maintaining nutritional needs  Description: Monitor and assess patient's nutrition/hydration status for malnutrition. Collaborate with interdisciplinary team and initiate plan and interventions as ordered.  Monitor patient's weight and dietary intake as ordered or per policy. Utilize nutrition screening tool and intervene as necessary. Determine patient's food preferences and provide high-protein, high-caloric foods as appropriate.     INTERVENTIONS:  - Monitor oral intake, urinary output, labs, and treatment plans  - Assess nutrition and hydration status and recommend course of action  - Evaluate amount of meals eaten  - Assist patient with eating if necessary   - Allow adequate time for meals  - Recommend/  encourage appropriate diets, oral nutritional supplements, and vitamin/mineral supplements  - Order, calculate, and assess calorie counts as needed  - Recommend, monitor, and adjust tube feedings and TPN/PPN based on assessed needs  - Assess need for intravenous fluids  - Provide specific nutrition/hydration education as appropriate  - Include patient/family/caregiver in decisions related to nutrition  Outcome: Progressing     Problem: GASTROINTESTINAL - ADULT  Goal: Maintains adequate nutritional intake  Description: INTERVENTIONS:  - Monitor percentage of each meal consumed  - Identify factors contributing to decreased intake, treat as appropriate  - Assist with meals as needed  - Monitor I&O, weight, and lab values if indicated  - Obtain nutrition services referral as needed  Outcome: Progressing     Problem: METABOLIC, FLUID AND ELECTROLYTES - ADULT  Goal: Glucose maintained within target range  Description: INTERVENTIONS:  - Monitor Blood Glucose as ordered  - Assess for signs and symptoms of hyperglycemia and hypoglycemia  - Administer ordered medications to maintain glucose within target range  - Assess nutritional intake and initiate nutrition service referral as needed  Outcome: Progressing

## 2024-06-17 NOTE — CONSULTS
Consultation - Maternal Fetal Medicine   Verito Mata 39 y.o. female MRN: [id number]  Unit/Bed#: LD TRIAGE 2- Encounter: [id number]  Admit date: 2024.  Today's date: 24    Assessment & Plan   Ms. Mata is a 39 y.o. year-old  at 33w1d, Hospital Day: 1, admitted for uncontrolled T2DM.  By issue:    * Pre-existing type 2 diabetes mellitus with hyperglycemia during pregnancy in third trimester (HCC)  Assessment & Plan  Lab Results   Component Value Date    HGBA1C 7.0 (H) 2024       Recent Labs     24  0934   POCGLU 261*       Blood Sugar Average: Last 72 hrs:  (P) 261    Home Dexacom: for 7d PTA, sugars are at goal 31% of the time and above goal the rest of the time (never below goal)  Home regimen PTA:  - Lantus 80 U qhs  - Humalog 26 U TID w/ meals    CBC, CMP, & UA without evidence of DKA w/ AG 8  Beta hydroxybutyrate pending    Recommend continued use of Dexacom inpatient  Recommend addition of POCT glucose fasting & 2 hr post-prandial  Consider insulin as above - adjust as needed  F/u beta hydroxybutyrate    History of smoking  Assessment & Plan  Smoked 1 ppd prior to pregnancy but quit when she learned she was pregnant.    H/O shoulder dystocia in prior pregnancy, currently pregnant  Assessment & Plan  H/o shoulder dystocia in prior pregnancy with 7 lb fetus  Patient counseled that she is at increased risk for shoulder dystocia this pregnancy due to h/o shoulder dystocia, T2DM, suspected FGR w/ AC > HC    Monitor fetal growth & biometry this pregnancy  Highly anticipate need for  given high risk for shoulder dystocia this pregnancy (this can be re-evaluated pending GA & growth, though at 34w, this fetus is projected to be the same size of her prior fetus with whom she had a shoulder dystocia)    Elevated blood pressure reading without diagnosis of hypertension  Assessment & Plan  Single elevated BP of 144/84 at 30w.  Systolic (24hrs), Av , Min:114 , Max:119    Diastolic (24hrs), Av, Min:62, Max:72    Monitor BP this pregnancy    Excessive fetal growth affecting management of pregnancy in third trimester  Assessment & Plan  Level I US on 24 at 32w2d:  Transverse presentation  Anterior placenta  SAKINA 12.1 cm  EFW 5lb 9oz (98%), AC >99%, HC 13%    Obesity complicating pregnancy, childbirth, or puerperium, antepartum  Assessment & Plan  Pre-gravid BMI 36  Current BMI 43  TWG 17.4 kg (38 lb 6.4 oz)    SCDs & ambulation for DVT ppx  Recommend weekly weights to track weight gain in pregnancy  Recommended weight gain 11-20 lb    Multigravida of advanced maternal age in third trimester  Assessment & Plan  38 yo  No NIPS or MSAFP completed    Tetrahydrocannabinol (THC) use disorder, mild, abuse  Assessment & Plan  Smoked THC early in pregnancy but stopped once she learned she was pregnant.    33 weeks gestation of pregnancy  Assessment & Plan  Overview:  Labs  Pap smear none on file; GC/CT neg  Prenatal panel notable for elevated 1 hr GTT  28w labs pending (collected on admission)  Vaccines:  Flu vaccine: due this flu season  Tdap vaccine: 24  Genetic screening: none completed  Contraception: Depo Provera  Feeding plan: breast & bottle  Birth plan:   Delivery consent: signed 24    Plan:  Continue LDASA  Daily PNV           Chief Complaint   Patient presents with    Blood Sugar Problem     Here for glucose control control       Physician Requesting Consult: Kathryn Burch DO    Reason for Consult / Principal Problem: uncontrolled T2DM    Subspeciality: Perinatology    HPI:  Verito Mata is a 39 y.o.  with an VIBHA of 24 at 33w1d, Hospital Day: 1, admitted for uncontrolled T2DM.  Her current pregnancy is significant for uncontrolled T2DM, obesity, h/o THC use & smoking, & elevated BP w/o diagnosis of HTN.  Her obstetrical history is significant for 2 term SVDs, an ectopic pregnancy managed by laparoscopic salpingectomy, and 3 SABs managed  expectantly (no medication or surgery).      Review of Systems   Constitutional:  Negative for chills and fever.   Eyes:  Negative for visual disturbance.   Respiratory:  Negative for cough and shortness of breath.    Cardiovascular:  Negative for chest pain and leg swelling.   Gastrointestinal:  Negative for abdominal pain, diarrhea, nausea and vomiting.   Genitourinary:  Negative for dysuria, frequency, hematuria, pelvic pain, urgency, vaginal bleeding and vaginal discharge.   Neurological:  Negative for dizziness, light-headedness and headaches.       Historical Information   OB History    Para Term  AB Living   7 2 2 0 4 2   SAB IAB Ectopic Multiple Live Births   3 0 1 0 2      # Outcome Date GA Lbr Amaury/2nd Weight Sex Type Anes PTL Lv   7 Current            6 SAB 2020 10w0d    SAB      5 SAB 2020     SAB      4 SAB 2018     SAB      3 Ectopic 2015     ECTOPIC      2 Term 13 40w0d  3175 g (7 lb) F Vag-Spont EPI  TERRELL      Complications: Shoulder Dystocia   1 Term 06 40w0d  3175 g (7 lb) M Vag-Spont EPI  TERRELL     Gynecologic history: Patient's last menstrual period was 10/15/2023.     Past Medical History:   Diagnosis Date    Anxiety     Depression     Ectopic pregnancy 2015    Miscarriage     , 2020 x 2    Type 2 diabetes mellitus affecting pregnancy in second trimester, antepartum      Past Surgical History:   Procedure Laterality Date    APPENDECTOMY  2014    DIAGNOSTIC LAPAROSCOPY Left     ectopic- left (tube was cut, not removed per pt)    MULTIPLE TOOTH EXTRACTIONS      all teeth removed     Social History   Social History     Substance and Sexual Activity   Alcohol Use Not Currently    Comment: occasionally- not since confirmed pregnancy     Social History     Substance and Sexual Activity   Drug Use Not Currently    Types: Marijuana    Comment: medical- quit 23 with confirmed pregnancy     Social History     Tobacco Use   Smoking Status Former    Current  packs/day: 0.00    Average packs/day: 0.5 packs/day for 20.0 years (10.0 ttl pk-yrs)    Types: Cigarettes    Quit date: 2023    Years since quittin.5   Smokeless Tobacco Never     Family History:  strong family h/o diabetes    Meds/Allergies   Prior to Admission Medications   Prescriptions Last Dose Informant Patient Reported? Taking?   Blood Glucose Monitoring Suppl (Contour Next EZ) w/Device KIT 2024  No Yes   Sig: Dispense 1 kit per insurance formulary. GDM.   Continuous Blood Gluc  (Dexcom G7 ) SIGRID 2024  No Yes   Sig: Use 1 Device continuous T2DM and pregnancy   Continuous Glucose Sensor (Dexcom G7 Sensor) 2024  No Yes   Sig: Use 1 Device every 10 days T2DM on intensive insulin therapy and pregnancy.   Contour Next Test test strip 2024  No Yes   Sig: Test 4 times a day. GDM   Insulin Pen Needle 31G X 5 MM MISC 2024  No Yes   Sig: Use up to 5 pen needles a day. T2DM and pregnancy.   Lantus SoloStar 100 units/mL SOPN   No No   Sig: INJECT 0.48 ML (48 UNITS TOTAL) UNDER THE SKIN DAILY AT BEDTIME INJECT 40 UNITS ONCE DAILY AT BEDTIME (10-11PM)   Patient not taking: Reported on 2024   Microlet Lancets MISC   No No   Sig: Use 4 a day. GDM   Prenatal Vit w/Rt-Fnrycejwn-BS (PNV PO) 2024  Yes Yes   Sig: Take by mouth   aspirin 81 mg chewable tablet 2024 at 2000  No Yes   Sig: Chew 2 tablets (162 mg total) daily   insulin lispro (HumaLOG KwikPen) 100 units/mL injection pen 2024 at 0730 Self No Yes   Siunits under the skin prior to breakfast, 14units under the skin prior to lunch and dinner   Patient taking differently: 26 units under the skin prior to breakfast, 26 units under the skin prior to lunch and 26 units at dinner      Facility-Administered Medications: None       Current Facility-Administered Medications   Medication Dose Route Frequency    acetaminophen (TYLENOL) tablet 975 mg  975 mg Oral Q8H PRN    calcium carbonate (TUMS) chewable  "tablet 1,000 mg  1,000 mg Oral TID PRN    hydrOXYzine HCL (ATARAX) tablet 25 mg  25 mg Oral Q6H PRN    melatonin tablet 3 mg  3 mg Oral HS PRN    ondansetron (ZOFRAN) injection 4 mg  4 mg Intravenous Q6H PRN     No Known Allergies    Objective    Patient Vitals for the past 24 hrs:   Temp Temp src Resp Height Weight   06/17/24 0838 98.4 °F (36.9 °C) Oral 18 5' 4\" (1.626 m) 115 kg (253 lb 6.4 oz)     Vitals:  Temperature 98.4 °F (36.9 °C), temperature source Oral, resp. rate 18, height 5' 4\" (1.626 m), weight 115 kg (253 lb 6.4 oz), last menstrual period 10/15/2023.Body mass index is 43.5 kg/m².    Physical Exam  Constitutional:       General: She is not in acute distress.     Appearance: Normal appearance. She is not ill-appearing.   HENT:      Mouth/Throat:      Mouth: Mucous membranes are moist.   Eyes:      Extraocular Movements: Extraocular movements intact.   Cardiovascular:      Rate and Rhythm: Normal rate and regular rhythm.      Heart sounds: Normal heart sounds.   Pulmonary:      Effort: Pulmonary effort is normal.      Breath sounds: Normal breath sounds.   Abdominal:      General: There is no distension.      Palpations: Abdomen is soft.      Tenderness: There is no abdominal tenderness. There is no guarding.   Musculoskeletal:         General: No swelling or tenderness.      Right lower leg: No edema.      Left lower leg: No edema.   Skin:     General: Skin is warm and dry.      Coloration: Skin is not jaundiced or pale.   Neurological:      General: No focal deficit present.      Mental Status: She is alert.   Psychiatric:         Mood and Affect: Mood normal.         Behavior: Behavior normal.         Thought Content: Thought content normal.         Judgment: Judgment normal.     Prenatal Labs: I have personally reviewed pertinent reports.    Blood type: O positive  Antibody: negative  GBS: pending  HIV: non-reactive  Rubella: immune  Syphilis IgM/IgG: non-reactive  HBsAg: non-reactive  HCAb: " "non-reactive  Chlamydia: negative  Gonorrhea: negative  Diabetes 1 hour screen: 285  3 hour glucose: not indicated  Platelets: 269  Hgb: 14.6    Results from last 7 days   Lab Units 06/17/24  0949   WBC Thousand/uL 9.58   TOTAL NEUT ABS Thousands/µL 7.27   HEMOGLOBIN g/dL 12.3   MCV fL 82   PLATELETS Thousands/uL 208     Results from last 7 days   Lab Units 06/17/24  0949   SEGS PCT % 75   MONO PCT % 4   EOS PCT % 1           Invalid input(s): \"GLU\", \"CA\"      Results from last 7 days   Lab Units 06/17/24  0949   PLATELETS Thousands/uL 208     Results from last 7 days   Lab Units 06/17/24  0934   POC GLUCOSE mg/dl 261*       Fetal data:  Nonstress test: date 06/17/24 Time: 0832 - 1017  Baseline:  135 bpm  Variability: moderate  Accelerations: present, 15x15  Decelerations: absent  Contractions: absent  Assessment: reactive  Plan: continue NST TID and PRN    MFM ultrasound report key findings:   Level I US on 6/11/24 at 32w2d:  Transverse presentation  Anterior placenta  SAKINA 12.1 cm  EFW 5lb 9oz (98%), AC >99%, HC 13%    Imaging, EKG, Pathology, and Other Studies: I have personally reviewed pertinent reports.          Sary Desai MD  6/17/2024  10:10 AM  "

## 2024-06-18 LAB
GLUCOSE SERPL-MCNC: 141 MG/DL (ref 65–140)
GLUCOSE SERPL-MCNC: 144 MG/DL (ref 65–140)
GLUCOSE SERPL-MCNC: 178 MG/DL (ref 65–140)
GLUCOSE SERPL-MCNC: 212 MG/DL (ref 65–140)

## 2024-06-18 PROCEDURE — 99232 SBSQ HOSP IP/OBS MODERATE 35: CPT | Performed by: OBSTETRICS & GYNECOLOGY

## 2024-06-18 PROCEDURE — 59025 FETAL NON-STRESS TEST: CPT | Performed by: OBSTETRICS & GYNECOLOGY

## 2024-06-18 PROCEDURE — 82948 REAGENT STRIP/BLOOD GLUCOSE: CPT

## 2024-06-18 PROCEDURE — NC001 PR NO CHARGE: Performed by: OBSTETRICS & GYNECOLOGY

## 2024-06-18 RX ORDER — INSULIN GLARGINE 100 [IU]/ML
110 INJECTION, SOLUTION SUBCUTANEOUS
Status: DISCONTINUED | OUTPATIENT
Start: 2024-06-18 | End: 2024-06-19

## 2024-06-18 RX ORDER — INSULIN LISPRO 100 [IU]/ML
40 INJECTION, SOLUTION INTRAVENOUS; SUBCUTANEOUS
Status: DISCONTINUED | OUTPATIENT
Start: 2024-06-18 | End: 2024-06-21

## 2024-06-18 RX ADMIN — INSULIN LISPRO 32 UNITS: 100 INJECTION, SOLUTION INTRAVENOUS; SUBCUTANEOUS at 07:22

## 2024-06-18 RX ADMIN — INSULIN LISPRO 32 UNITS: 100 INJECTION, SOLUTION INTRAVENOUS; SUBCUTANEOUS at 11:26

## 2024-06-18 RX ADMIN — INSULIN LISPRO 40 UNITS: 100 INJECTION, SOLUTION INTRAVENOUS; SUBCUTANEOUS at 18:21

## 2024-06-18 RX ADMIN — ASPIRIN 81 MG 162 MG: 81 TABLET ORAL at 21:57

## 2024-06-18 RX ADMIN — Medication 1 TABLET: at 21:57

## 2024-06-18 RX ADMIN — INSULIN GLARGINE 110 UNITS: 100 INJECTION, SOLUTION SUBCUTANEOUS at 21:57

## 2024-06-18 NOTE — UTILIZATION REVIEW
Initial Clinical Review    Admission: Date/Time/Statement:   Admission Orders (From admission, onward)       Ordered        24 0956  Inpatient Admission  Once                          Orders Placed This Encounter   Procedures    Inpatient Admission     Standing Status:   Standing     Number of Occurrences:   1     Order Specific Question:   Level of Care     Answer:   Med Surg [16]     Order Specific Question:   Estimated length of stay     Answer:   More than 2 Midnights     Order Specific Question:   Certification     Answer:   I certify that inpatient services are medically necessary for this patient for a duration of greater than two midnights. See H&P and MD Progress Notes for additional information about the patient's course of treatment.         Chief Complaint   Patient presents with    Blood Sugar Problem     Here for glucose control control       Initial Presentation: 39 y.o. female presented to L&D from  Center as inpatient admission for hyperglycemia. G 7 P 2 @ 33 weeks gestation admitted for poor blood glucose control in relation to her known history of type 2 diabetes. Her most recent hemoglobin A1c was 7.0%. Recent fetal ultrasound evaluation revealed an evolving large for gestational age growth pattern. She is currently treated with both long and short acting insulin preparations. Increases in insulin dosage both before meals and at night will be made in an effort to achieve euglycemia. Goals: (Fasting) 60-95mg/dL // (2hr PP) <120mg/dL  Plan Home Dexacom: for 7d PTA, sugars are at goal 31% of the time and above goal the rest of the time (never below goal) Recommend continued use of Dexacom inpatient Recommend addition of POCT glucose fasting & 2 hr post-prandial SQ insulin - adjust as needed 2000 elizabeth /cho diet, NST Daily and supportive care.: Lantus increased to 96 U qhs, Humalog increased to 32 U TID w/ meals   Fetal data:  Nonstress test: date 24 Time: 832  1017  Baseline:  135 bpm  Variability: moderate  Accelerations: present, 15x15  Decelerations: absent  Contractions: absent  Assessment: reactive  Plan: continue NST TID and PRN     MFM ultrasound report key findings:   Level I US on 6/11/24 at 32w2d:  Transverse presentation  Anterior placenta  SAKINA 12.1 cm  EFW 5lb 9oz (98%), AC >99%, HC 13%      Date: 06-18-24   Day 2: continue blood sugars FBS 2 hr PP,adjust insulin accordingly Goals: (Fasting) 60-95mg/dL // (2hr PP) <120mg/dLBlood sugars remain above goal. Recommend increasing Lantus to 110 units in the evening. SCD Denies cramping leaking or bleeding  (+) fetal movement NST daily       Admitting  Vitals   Temperature Pulse Respirations Blood Pressure SpO2 Pain Score   06/17/24 0838 06/17/24 0850 06/17/24 0838 06/17/24 0850 06/17/24 1247 06/17/24 0838   98.4 °F (36.9 °C) 101 18 119/72 98 % No Pain     Weight (last 2 days)       Date/Time Weight    06/18/24 0730 --    Comment rows:    OBSERV: Pt reports positive FM, denies LOF, vaginal bleeding, or contractions. Plan of care for today reviewed. at 06/18/24 0730    06/17/24 1953 116 (256.2)    06/17/24 1952 --    Comment rows:    OBSERV: denies ctx/ tightening, LOF/ VB/ positive FM at 06/17/24 1952 06/17/24 1300 --    Comment rows:    OBSERV: Pt reports positive FM, denies vaginal bleeding, LOF and abdominal pain. Plan of care reviewed. at 06/17/24 1300    06/17/24 1215 --    Comment rows:    OBSERV: pt recieved via WC and oriented to room 330. at 06/17/24 1215    06/17/24 0838 115 (253.4)            Vital Signs (last 3 days)       Date/Time Temp Pulse Resp BP SpO2 O2 Device Cardiac (WDL) Patient Position - Orthostatic VS Pain    06/18/24 1230 98.6 °F (37 °C) 113 18 119/66 -- -- -- Sitting No Pain    06/18/24 0915 97.9 °F (36.6 °C) 101 18 124/72 -- -- -- Sitting No Pain    06/18/24 0730 -- -- -- -- -- -- WDL -- No Pain    OBSERV: Pt reports positive FM, denies LOF, vaginal bleeding, or contractions. Plan of  care for today reviewed. at 06/18/24 0730    06/18/24 0422 97.7 °F (36.5 °C) 90 16 128/81 98 % None (Room air) -- Lying --    06/17/24 2345 97.8 °F (36.6 °C) 99 18 128/76 98 % None (Room air) -- Sitting No Pain    06/17/24 2006 98.4 °F (36.9 °C) 101 17 124/77 98 % None (Room air) -- Lying No Pain    06/17/24 1953 -- 99 -- 119/71 -- -- -- -- --    06/17/24 1952 98 °F (36.7 °C) -- 16 -- -- None (Room air) WDL -- No Pain    OBSERV: denies ctx/ tightening, LOF/ VB/ positive FM at 06/17/24 1952 06/17/24 1606 97.9 °F (36.6 °C) 108 15 120/70 98 % None (Room air) -- Lying 5    06/17/24 1300 -- -- -- -- -- -- WDL -- No Pain    OBSERV: Pt reports positive FM, denies vaginal bleeding, LOF and abdominal pain. Plan of care reviewed. at 06/17/24 1300    06/17/24 1247 98.5 °F (36.9 °C) 107 18 129/78 98 % None (Room air) -- Sitting No Pain    06/17/24 1215 -- -- -- -- -- -- -- -- --    OBSERV: pt recieved via WC and oriented to room 330. at 06/17/24 1215    06/17/24 0935 -- 98 -- 116/66 -- -- -- -- --    06/17/24 0920 -- 100 -- 114/62 -- -- -- -- --    06/17/24 0905 -- 97 -- 117/63 -- -- -- -- --    06/17/24 0850 -- 101 -- 119/72 -- -- -- -- --    06/17/24 0838 98.4 °F (36.9 °C) -- 18 -- -- -- -- -- No Pain              Pertinent Labs/Diagnostic Test Results:         Results from last 7 days   Lab Units 06/17/24  0949   WBC Thousand/uL 9.58   HEMOGLOBIN g/dL 12.3   HEMATOCRIT % 37.4   PLATELETS Thousands/uL 208   TOTAL NEUT ABS Thousands/µL 7.27         Results from last 7 days   Lab Units 06/17/24  0949   SODIUM mmol/L 134*   POTASSIUM mmol/L 3.7   CHLORIDE mmol/L 105   CO2 mmol/L 21   ANION GAP mmol/L 8   BUN mg/dL 8   CREATININE mg/dL 0.48*   EGFR ml/min/1.73sq m 125   CALCIUM mg/dL 8.9     Results from last 7 days   Lab Units 06/17/24  0949   AST U/L 9*   ALT U/L 9   ALK PHOS U/L 102   TOTAL PROTEIN g/dL 6.2*   ALBUMIN g/dL 3.1*   TOTAL BILIRUBIN mg/dL 0.21     Results from last 7 days   Lab Units 06/18/24  1032  06/18/24  0950 06/18/24  0550 06/17/24  2031 06/17/24  1530 06/17/24  0934   POC GLUCOSE mg/dl 212* 141* 144* 153* 197* 261*     Results from last 7 days   Lab Units 06/17/24  0949   GLUCOSE RANDOM mg/dL 230*         Results from last 7 days   Lab Units 06/17/24  0949   HEMOGLOBIN A1C % 7.5*   EAG mg/dl 169     Beta- Hydroxybutyrate   Date Value Ref Range Status   06/17/2024 <0.05 0.02 - 0.27 mmol/L Final      Results from last 7 days   Lab Units 06/17/24  0954 06/17/24  0949   CLARITY UA  Turbid  --    COLOR UA  Light Yellow  --    SPEC GRAV UA  1.021  --    PH UA  6.0  --    GLUCOSE UA mg/dl 300 (3/10%)*  --    KETONES UA mg/dl Negative  --    BLOOD UA  Negative  --    PROTEIN UA mg/dl Trace*  --    NITRITE UA  Negative  --    BILIRUBIN UA  Negative  --    UROBILINOGEN UA (BE) mg/dl <2.0  --    LEUKOCYTES UA  Moderate*  --    WBC UA /hpf 10-20*  --    RBC UA /hpf 1-2  --    BACTERIA UA /hpf Occasional  --    EPITHELIAL CELLS WET PREP /hpf Moderate*  --    MUCUS THREADS  Occasional*  --    CREATININE UR mg/dL  --  105.4   PROTEIN UR mg/dL  --  31   PROT/CREAT RATIO UR   --  0.29*       Results from last 7 days   Lab Units 06/17/24  0954   URINE CULTURE  Culture too young- will reincubate           Past Medical History:   Diagnosis Date    Anxiety     Depression     Ectopic pregnancy 2015    Miscarriage     2018, 2020 x 2    Type 2 diabetes mellitus affecting pregnancy in second trimester, antepartum 2024     Present on Admission:   Pre-existing type 2 diabetes mellitus with hyperglycemia during pregnancy in third trimester (HCC)   Multigravida of advanced maternal age in third trimester   Obesity complicating pregnancy, childbirth, or puerperium, antepartum   Excessive fetal growth affecting management of pregnancy in third trimester   Elevated blood pressure reading without diagnosis of hypertension   Tetrahydrocannabinol (THC) use disorder, mild, abuse      Admitting Diagnosis: Encounter for supervision of normal  pregnancy, unspecified, unspecified trimester [Z34.90]  Age/Sex: 39 y.o. female  Admission Orders:  Scheduled Medications:  aspirin, 162 mg, Oral, HS  insulin glargine, 110 Units, Subcutaneous, HS  insulin lispro, 32 Units, Subcutaneous, TID With Meals  prenatal multivitamin, 1 tablet, Oral, HS      Continuous IV Infusions:     PRN Meds:  acetaminophen, 975 mg, Oral, Q8H PRN  calcium carbonate, 1,000 mg, Oral, TID PRN  hydrOXYzine HCL, 25 mg, Oral, Q6H PRN  melatonin, 3 mg, Oral, HS PRN  ondansetron, 4 mg, Intravenous, Q6H PRN        IP CONSULT TO PERINATOLOGY    Network Utilization Review Department  ATTENTION: Please call with any questions or concerns to [phone] and carefully listen to the prompts so that you are directed to the right person. All voicemails are confidential.   For Discharge needs, contact Care Management DC Support Team at [phone] opt. 2  Send all requests for admission clinical reviews, approved or denied determinations and any other requests to dedicated fax number below belonging to the [place] where the patient is receiving treatment. List of dedicated fax numbers for the Facilities:  FACILITY NAME UR FAX NUMBER   ADMISSION DENIALS (Administrative/Medical Necessity) [phone]   DISCHARGE SUPPORT TEAM (NETWORK) [phone]   PARENT CHILD HEALTH (Maternity/NICU/Pediatrics) [phone]   [facility] [phone]   [facility] [phone]   [facility] [phone]   [facility] [phone]   [facility] [phone]   [facility] [phone]   General acute hospital [phone]   [facility] [phone]   [facility] [phone]   [facility] [phone]   [facility] [phone]   [facility]  [facility] [phone]

## 2024-06-18 NOTE — UTILIZATION REVIEW
NOTIFICATION OF INPATIENT ADMISSION   [facility] MATERNITY AUTHORIZATION REQUEST   SERVICING FACILITY:   [facility]  Parent Child Health - L&D, [place], NICU   [facility] [place] [address]  Tax ID: [id number]  NPI: [id number]   ATTENDING PROVIDER:  Attending Name and NPI#: Kathryn Burch Do [[id number]]  Address: [address] [place] [address] [place] [address]  Phone: [phone]     ADMISSION INFORMATION:  Place of Service: Inpatient [facility]  Place of Service Code: 21  Inpatient Admission Date/Time: 24  9:56 AM  Discharge Date/Time: No discharge date for patient encounter.  Admitting Diagnosis Code/Description:  Encounter for supervision of normal pregnancy, unspecified, unspecified trimester [Z34.90]     UTILIZATION REVIEW CONTACT:  Carmen Cain Utilization   Network Utilization Review Department  Phone: [phone]  Fax [phone]  Email: [email or web address]  Contact for approvals/pending authorizations, clinical reviews, and discharge.     PHYSICIAN ADVISORY SERVICES:  Medical Necessity Denial & Hnwy-yz-Ilyq Review  Phone: [phone]  Fax: [phone]  Email: [email or web address]     DISCHARGE SUPPORT TEAM:  For Patients Discharge Needs & Updates  Phone: [phone] opt. 2 Fax: [phone]  Email: [email or web address]

## 2024-06-18 NOTE — PROGRESS NOTES
Progress Note - Maternal Fetal Medicine   Verito Mata 39 y.o. female MRN: [id number]  Unit/Bed#: -01 Encounter: [id number]  Admit date: 2024.  Today's date: 24    Assessment & Plan     Ms. Mata is a 39 y.o.  at 33w2d, Hospital Day: 2, admitted with uncontrolled T2DM.  By issue:    * Pre-existing type 2 diabetes mellitus with hyperglycemia during pregnancy in third trimester (HCC)  Assessment & Plan  Lab Results   Component Value Date    HGBA1C 7.5 (H) 2024       Recent Labs     24  0934 24  1530 24  2031 24  0550   POCGLU 261* 197* 153* 144*       Blood Sugar Average: Last 72 hrs:  (P) 188.75    Home Dexacom: for 7d PTA, sugars are at goal 31% of the time and above goal the rest of the time (never below goal)    Home regimen PTA:  - Lantus 80 U qhs  - Humalog 26 U TID w/ meals    : Lantus increased to 96 U qhs, Humalog increased to 32 U TID w/ meals    CBC, CMP, beta hydroxybutyrate, & UA without evidence of DKA w/ AG 8    Recommend continued use of Dexacom inpatient  Recommend addition of POCT glucose fasting & 2 hr post-prandial  Consider insulin as above - adjust as needed    33 weeks gestation of pregnancy  Assessment & Plan  Overview:  Labs  Pap smear none on file; GC/CT neg  Prenatal panel notable for elevated 1 hr GTT  28w labs pending (collected on admission)  Vaccines:  Flu vaccine: due this flu season  Tdap vaccine: 24  Genetic screening: none completed  Contraception: Depo Provera  Feeding plan: breast & bottle  Birth plan:   Delivery consent: signed 24    Plan:  Continue LDASA  Daily PNV  FEN: diabetic diet  DVT ppx: SCDs  Monitoring: NST daily    History of smoking  Assessment & Plan  Smoked 1 ppd prior to pregnancy but quit when she learned she was pregnant.    H/O shoulder dystocia in prior pregnancy, currently pregnant  Assessment & Plan  H/o shoulder dystocia in prior pregnancy with 7 lb fetus  Patient counseled that  she is at increased risk for shoulder dystocia this pregnancy due to h/o shoulder dystocia, T2DM, suspected FGR w/ AC > HC    Monitor fetal growth & biometry this pregnancy  Highly anticipate need for  given high risk for shoulder dystocia this pregnancy (this can be re-evaluated pending GA & growth, though at 34w, this fetus is projected to be the same size of her prior fetus with whom she had a shoulder dystocia)    Elevated blood pressure reading without diagnosis of hypertension  Assessment & Plan  Single elevated BP of 144/84 at 30w.  Systolic (24hrs), Av , Min:114 , Max:129   Diastolic (24hrs), Av, Min:62, Max:81    Monitor BP this pregnancy    Excessive fetal growth affecting management of pregnancy in third trimester  Assessment & Plan  Level I US on 24 at 32w2d:  Transverse presentation  Anterior placenta  SAKINA 12.1 cm  EFW 5lb 9oz (98%), AC >99%, HC 13%    Obesity complicating pregnancy, childbirth, or puerperium, antepartum  Assessment & Plan  Pre-gravid BMI 36  Current BMI 43  TWG 17.4 kg (38 lb 6.4 oz)    SCDs & ambulation for DVT ppx  Recommend weekly weights to track weight gain in pregnancy  Recommended weight gain 11-20 lb    Multigravida of advanced maternal age in third trimester  Assessment & Plan  38 yo  No NIPS or MSAFP completed    Tetrahydrocannabinol (THC) use disorder, mild, abuse  Assessment & Plan  Smoked THC early in pregnancy but stopped once she learned she was pregnant.             Subjective    Patient sleeping comfortably and not woken up for AM resident rounds.  Per nurse, patient slept overnight and had no complaints.      Objective      Patient Vitals for the past 24 hrs:   BP Temp Temp src Pulse Resp SpO2 Height Weight   24 0422 128/81 97.7 °F (36.5 °C) Oral 90 16 98 % -- --   24 128/76 97.8 °F (36.6 °C) Oral 99 18 98 % -- --   24 124/77 98.4 °F (36.9 °C) Oral 101 17 98 % -- --   24 119/71 -- -- 99 -- -- -- 116 kg (256  "lb 3.2 oz)   06/17/24 1952 -- 98 °F (36.7 °C) -- -- 16 -- -- --   06/17/24 1606 120/70 97.9 °F (36.6 °C) Oral (!) 108 15 98 % -- --   06/17/24 1247 129/78 98.5 °F (36.9 °C) Oral (!) 107 18 98 % -- --   06/17/24 0935 116/66 -- -- 98 -- -- -- --   06/17/24 0920 114/62 -- -- 100 -- -- -- --   06/17/24 0905 117/63 -- -- 97 -- -- -- --   06/17/24 0850 119/72 -- -- 101 -- -- -- --   06/17/24 0838 -- 98.4 °F (36.9 °C) Oral -- 18 -- 5' 4\" (1.626 m) 115 kg (253 lb 6.4 oz)       Physical Exam  - Deferred as patient was sleeping comfortably on AM resident rounds    I/O       None            Invasive Devices       Peripheral Intravenous Line  Duration             Peripheral IV 06/17/24 Distal;Dorsal (posterior);Right Forearm <1 day                    Results from last 7 days   Lab Units 06/17/24  0949   WBC Thousand/uL 9.58   TOTAL NEUT ABS Thousands/µL 7.27   HEMOGLOBIN g/dL 12.3   MCV fL 82   PLATELETS Thousands/uL 208     Results from last 7 days   Lab Units 06/17/24  0949   POTASSIUM mmol/L 3.7   CHLORIDE mmol/L 105   CO2 mmol/L 21   BUN mg/dL 8   CREATININE mg/dL 0.48*   EGFR ml/min/1.73sq m 125     Results from last 7 days   Lab Units 06/17/24  0949   AST U/L 9*   ALT U/L 9     Results from last 7 days   Lab Units 06/17/24  0949   PLATELETS Thousands/uL 208     Results from last 7 days   Lab Units 06/18/24  0550 06/17/24  2031 06/17/24  1530 06/17/24  0934   POC GLUCOSE mg/dl 144* 153* 197* 261*     Results from last 7 days   Lab Units 06/17/24  0949   PROT/CREAT RATIO UR  0.29*                   Brief review of pertinent history:  Past Medical History:   Diagnosis Date    Anxiety     Depression     Ectopic pregnancy 2015    Miscarriage     2018, 2020 x 2    Type 2 diabetes mellitus affecting pregnancy in second trimester, antepartum 2024     Past Surgical History:   Procedure Laterality Date    APPENDECTOMY  2014    DIAGNOSTIC LAPAROSCOPY Left 2015    ectopic- left (tube was cut, not removed per pt)    MULTIPLE TOOTH " EXTRACTIONS  2006    all teeth removed     OB History    Para Term  AB Living   7 2 2 0 4 2   SAB IAB Ectopic Multiple Live Births   3 0 1 0 2      # Outcome Date GA Lbr Amaury/2nd Weight Sex Type Anes PTL Lv   7 Current            6 2020 10w0d    SAB      5 SAB 2020     SAB      4 2018     SAB      3 Ectopic 2015     ECTOPIC      2 Term 13 40w0d  3175 g (7 lb) F Vag-Spont EPI  TERRELL      Complications: Shoulder Dystocia   1 Term 06 40w0d  3175 g (7 lb) M Vag-Spont EPI  TERRELL       Fetal data:  Nonstress test:   24 NST pending  24 NST reactive as per note yesterday    MFM ultrasound report key findings:   Level I US on 24 at 32w2d:  Transverse presentation  Anterior placenta  SAKINA 12.1 cm  EFW 5lb 9oz (98%), AC >99%, HC 13%    MEDS:   Medication Administration - last 24 hours from 2024 0638 to 2024 0638         Date/Time Order Dose Route Action Action by     2024 1330 EDT insulin lispro (HumALOG/ADMELOG) 100 units/mL subcutaneous injection 26 Units 26 Units Subcutaneous Not Given Sheila Melo RN     2024 1512 EDT aspirin chewable tablet 162 mg 162 mg Oral Not Given Sheila Melo RN     2024 1512 EDT prenatal multivitamin tablet 1 tablet 1 tablet Oral Not Given Sheila Melo RN     2024 2222 EDT insulin glargine (LANTUS) subcutaneous injection 96 Units 0.96 mL 96 Units Subcutaneous Given Lore Westfall RN     2024 1807 EDT insulin lispro (HumALOG/ADMELOG) 100 units/mL subcutaneous injection 32 Units 32 Units Subcutaneous Given Sheila Melo RN     2024 1334 EDT insulin lispro (HumALOG/ADMELOG) 100 units/mL subcutaneous injection 32 Units 32 Units Subcutaneous Given Sheila Melo RN     2024 2222 EDT aspirin chewable tablet 162 mg 162 mg Oral Given Lore Westfall RN     2024 2222 EDT prenatal multivitamin tablet 1 tablet 1 tablet Oral Given Lore Westfall RN          Current  Facility-Administered Medications   Medication Dose Route Frequency    acetaminophen (TYLENOL) tablet 975 mg  975 mg Oral Q8H PRN    aspirin chewable tablet 162 mg  162 mg Oral HS    calcium carbonate (TUMS) chewable tablet 1,000 mg  1,000 mg Oral TID PRN    hydrOXYzine HCL (ATARAX) tablet 25 mg  25 mg Oral Q6H PRN    insulin glargine (LANTUS) subcutaneous injection 96 Units 0.96 mL  96 Units Subcutaneous HS    insulin lispro (HumALOG/ADMELOG) 100 units/mL subcutaneous injection 32 Units  32 Units Subcutaneous TID With Meals    melatonin tablet 3 mg  3 mg Oral HS PRN    ondansetron (ZOFRAN) injection 4 mg  4 mg Intravenous Q6H PRN    prenatal multivitamin tablet 1 tablet  1 tablet Oral HS            Sary Desai MD  OBGYN, PGY-3  6/18/2024  6:38 AM

## 2024-06-19 LAB
BACTERIA UR CULT: NORMAL
GLUCOSE SERPL-MCNC: 111 MG/DL (ref 65–140)
GLUCOSE SERPL-MCNC: 127 MG/DL (ref 65–140)
GLUCOSE SERPL-MCNC: 127 MG/DL (ref 65–140)
GLUCOSE SERPL-MCNC: 142 MG/DL (ref 65–140)
GP B STREP DNA SPEC QL NAA+PROBE: NEGATIVE

## 2024-06-19 PROCEDURE — 82948 REAGENT STRIP/BLOOD GLUCOSE: CPT

## 2024-06-19 PROCEDURE — NC001 PR NO CHARGE: Performed by: OBSTETRICS & GYNECOLOGY

## 2024-06-19 PROCEDURE — 59025 FETAL NON-STRESS TEST: CPT | Performed by: OBSTETRICS & GYNECOLOGY

## 2024-06-19 PROCEDURE — 99232 SBSQ HOSP IP/OBS MODERATE 35: CPT | Performed by: OBSTETRICS & GYNECOLOGY

## 2024-06-19 RX ORDER — INSULIN GLARGINE 100 [IU]/ML
132 INJECTION, SOLUTION SUBCUTANEOUS
Status: DISCONTINUED | OUTPATIENT
Start: 2024-06-19 | End: 2024-06-21 | Stop reason: HOSPADM

## 2024-06-19 RX ADMIN — Medication 1 TABLET: at 21:51

## 2024-06-19 RX ADMIN — INSULIN LISPRO 40 UNITS: 100 INJECTION, SOLUTION INTRAVENOUS; SUBCUTANEOUS at 12:57

## 2024-06-19 RX ADMIN — SODIUM CHLORIDE, SODIUM LACTATE, POTASSIUM CHLORIDE, AND CALCIUM CHLORIDE 1000 ML: .6; .31; .03; .02 INJECTION, SOLUTION INTRAVENOUS at 06:19

## 2024-06-19 RX ADMIN — ASPIRIN 81 MG 162 MG: 81 TABLET ORAL at 21:51

## 2024-06-19 RX ADMIN — ACETAMINOPHEN 975 MG: 325 TABLET, FILM COATED ORAL at 18:42

## 2024-06-19 RX ADMIN — INSULIN LISPRO 40 UNITS: 100 INJECTION, SOLUTION INTRAVENOUS; SUBCUTANEOUS at 18:37

## 2024-06-19 RX ADMIN — INSULIN GLARGINE 132 UNITS: 100 INJECTION, SOLUTION SUBCUTANEOUS at 21:51

## 2024-06-19 RX ADMIN — INSULIN LISPRO 40 UNITS: 100 INJECTION, SOLUTION INTRAVENOUS; SUBCUTANEOUS at 07:22

## 2024-06-19 NOTE — PROGRESS NOTES
Progress Note - Maternal Fetal Medicine   Verito Mata 39 y.o. female MRN: [id number]  Unit/Bed#: -01 Encounter: [id number]  Admit date: 2024.  Today's date: 24    Assessment & Plan     Ms. Mata is a 39 y.o.  at 33w3d, Hospital Day: 3, admitted with uncontrolled T2DM.  By issue:    * Pre-existing type 2 diabetes mellitus with hyperglycemia during pregnancy in third trimester (HCC)  Assessment & Plan  Lab Results   Component Value Date    HGBA1C 7.5 (H) 2024       Recent Labs     24  0950 24  1359 24  2030 24  0543   POCGLU 141* 212* 178* 127       Blood Sugar Average: Last 72 hrs:  (P) 176.625    Home Dexacom: for 7d PTA, sugars are at goal 31% of the time and above goal the rest of the time (never below goal)    Home regimen PTA:  - Lantus 80 U qhs  - Humalog 26 U TID w/ meals    : Lantus increased to 96 U qhs, Humalog increased to 32 U TID w/ meals  : Lantus increased to 110 U qhs, Humalog increased to 40 U TID w/ meals  CBC, CMP, beta hydroxybutyrate, & UA without evidence of DKA w/ AG 8    Recommend continued use of Dexacom inpatient  Recommend addition of POCT glucose fasting & 2 hr post-prandial  Consider insulin as above - adjust as needed    33 weeks gestation of pregnancy  Assessment & Plan  Overview:  Labs  Pap smear none on file; GC/CT neg  Prenatal panel notable for elevated 1 hr GTT  28w labs pending (collected on admission)  Vaccines:  Flu vaccine: due this flu season  Tdap vaccine: 24  Genetic screening: none completed  Contraception: Depo Provera  Feeding plan: breast & bottle  Birth plan:   Delivery consent: signed 24    Plan:  Continue LDASA  Daily PNV  FEN: diabetic diet  DVT ppx: SCDs  Monitoring: NST daily    History of smoking  Assessment & Plan  Smoked 1 ppd prior to pregnancy but quit when she learned she was pregnant.    H/O shoulder dystocia in prior pregnancy, currently pregnant  Assessment & Plan  H/o  shoulder dystocia in prior pregnancy with 7 lb fetus  Patient counseled that she is at increased risk for shoulder dystocia this pregnancy due to h/o shoulder dystocia, T2DM, suspected FGR w/ AC > HC    Monitor fetal growth & biometry this pregnancy  Highly anticipate need for  given high risk for shoulder dystocia this pregnancy (this can be re-evaluated pending GA & growth, though at 34w, this fetus is projected to be the same size of her prior fetus with whom she had a shoulder dystocia)    Elevated blood pressure reading without diagnosis of hypertension  Assessment & Plan  Single elevated BP of 144/84 at 30w.  Systolic (24hrs), Av , Min:116 , Max:130   Diastolic (24hrs), Av, Min:66, Max:74    Monitor BP this pregnancy    Excessive fetal growth affecting management of pregnancy in third trimester  Assessment & Plan  Level I US on 24 at 32w2d:  Transverse presentation  Anterior placenta  SAKINA 12.1 cm  EFW 5lb 9oz (98%), AC >99%, HC 13%    Obesity complicating pregnancy, childbirth, or puerperium, antepartum  Assessment & Plan  Pre-gravid BMI 36  Current BMI 43  TWG 17.4 kg (38 lb 6.4 oz)    SCDs & ambulation for DVT ppx  Recommend weekly weights to track weight gain in pregnancy  Recommended weight gain 11-20 lb    Multigravida of advanced maternal age in third trimester  Assessment & Plan  38 yo  No NIPS or MSAFP completed    Tetrahydrocannabinol (THC) use disorder, mild, abuse  Assessment & Plan  Smoked THC early in pregnancy but stopped once she learned she was pregnant.             Subjective    Patient sleeping comfortably and not woken up for resident rounds.  Per nurse, patient had no complaints overnight.        Objective      Patient Vitals for the past 24 hrs:   BP Temp Temp src Pulse Resp SpO2   24 2341 119/74 98.2 °F (36.8 °C) Oral 104 20 96 %   24 2034 116/69 98 °F (36.7 °C) Oral (!) 112 20 98 %   24 1553 130/74 98.4 °F (36.9 °C) Oral (!) 110 20 --   24  1230 119/66 98.6 °F (37 °C) Oral (!) 113 18 --   24 0915 124/72 97.9 °F (36.6 °C) Oral 101 18 --       Physical Exam  - Deferred as patient was sleeping and not woken up for resident rounds    I/O       None            Invasive Devices       Peripheral Intravenous Line  Duration             Peripheral IV 24 Distal;Dorsal (posterior);Right Forearm 1 day              Line  Duration             Subcutaneous Right upper arm -- days                    Results from last 7 days   Lab Units 24  0949   WBC Thousand/uL 9.58   TOTAL NEUT ABS Thousands/µL 7.27   HEMOGLOBIN g/dL 12.3   MCV fL 82   PLATELETS Thousands/uL 208     Results from last 7 days   Lab Units 24  0949   POTASSIUM mmol/L 3.7   CHLORIDE mmol/L 105   CO2 mmol/L 21   BUN mg/dL 8   CREATININE mg/dL 0.48*   EGFR ml/min/1.73sq m 125     Results from last 7 days   Lab Units 24  0949   AST U/L 9*   ALT U/L 9     Results from last 7 days   Lab Units 24  0949   PLATELETS Thousands/uL 208     Results from last 7 days   Lab Units 24  0543 24  2030 24  1359 24  0950 24  0550 24  2031 24  1530 24  0934   POC GLUCOSE mg/dl 127 178* 212* 141* 144* 153* 197* 261*     Results from last 7 days   Lab Units 24  0949   PROT/CREAT RATIO UR  0.29*             Results from last 7 days   Lab Units 24  0954   URINE CULTURE  Culture too young- will reincubate       Brief review of pertinent history:  Past Medical History:   Diagnosis Date    Anxiety     Depression     Ectopic pregnancy 2015    Miscarriage     , 2020 x 2    Type 2 diabetes mellitus affecting pregnancy in second trimester, antepartum      Past Surgical History:   Procedure Laterality Date    APPENDECTOMY  2014    DIAGNOSTIC LAPAROSCOPY Left 2015    ectopic- left (tube was cut, not removed per pt)    MULTIPLE TOOTH EXTRACTIONS  2006    all teeth removed     OB History    Para Term  AB Living   7 2 2 0 4 2    SAB IAB Ectopic Multiple Live Births   3 0 1 0 2      # Outcome Date GA Lbr Amaury/2nd Weight Sex Type Anes PTL Lv   7 Current            6 SAB 11/2020 10w0d    SAB      5 SAB 07/2020     SAB      4 SAB 2018     SAB      3 Ectopic 2015     ECTOPIC      2 Term 05/24/13 40w0d  3175 g (7 lb) F Vag-Spont EPI  TERRELL      Complications: Shoulder Dystocia   1 Term 05/01/06 40w0d  3175 g (7 lb) M Vag-Spont EPI  TERRELL       Fetal data:  Nonstress test: date 06/19/24 Time: 0531 - 0551  Baseline:  140 bpm  Variability: moderate  Accelerations: present, 15x15  Decelerations: absent  Contractions:  2-3 contractions in total  Assessment: reactive  Plan:  continue NST daily & PRN    MFM ultrasound report key findings:   Level I US on 6/11/24 at 32w2d:  Transverse presentation  Anterior placenta  SAKINA 12.1 cm  EFW 5lb 9oz (98%), AC >99%, HC 13%    MEDS:   Medication Administration - last 24 hours from 06/18/2024 0546 to 06/19/2024 0546         Date/Time Order Dose Route Action Action by     06/18/2024 1230 EDT insulin lispro (HumALOG/ADMELOG) 100 units/mL subcutaneous injection 32 Units 32 Units Subcutaneous Not Given Sheila Melo RN     06/18/2024 1126 EDT insulin lispro (HumALOG/ADMELOG) 100 units/mL subcutaneous injection 32 Units 32 Units Subcutaneous Given Sheila Melo RN     06/18/2024 0722 EDT insulin lispro (HumALOG/ADMELOG) 100 units/mL subcutaneous injection 32 Units 32 Units Subcutaneous Given Sheila Melo RN     06/18/2024 2157 EDT aspirin chewable tablet 162 mg 162 mg Oral Given Fanny Light RN     06/18/2024 2157 EDT prenatal multivitamin tablet 1 tablet 1 tablet Oral Given Fanny Light RN     06/18/2024 2157 EDT insulin glargine (LANTUS) subcutaneous injection 110 Units 1.1 mL 110 Units Subcutaneous Given Fanny Light RN     06/18/2024 1821 EDT insulin lispro (HumALOG/ADMELOG) 100 units/mL subcutaneous injection 40 Units 40 Units Subcutaneous Given Lorene Cody RN          Current  Facility-Administered Medications   Medication Dose Route Frequency    acetaminophen (TYLENOL) tablet 975 mg  975 mg Oral Q8H PRN    aspirin chewable tablet 162 mg  162 mg Oral HS    calcium carbonate (TUMS) chewable tablet 1,000 mg  1,000 mg Oral TID PRN    hydrOXYzine HCL (ATARAX) tablet 25 mg  25 mg Oral Q6H PRN    insulin glargine (LANTUS) subcutaneous injection 110 Units 1.1 mL  110 Units Subcutaneous HS    insulin lispro (HumALOG/ADMELOG) 100 units/mL subcutaneous injection 40 Units  40 Units Subcutaneous TID With Meals    melatonin tablet 3 mg  3 mg Oral HS PRN    ondansetron (ZOFRAN) injection 4 mg  4 mg Intravenous Q6H PRN    prenatal multivitamin tablet 1 tablet  1 tablet Oral HS            Sary Desai MD  OBGYN, PGY-3  6/19/2024  5:46 AM

## 2024-06-19 NOTE — PLAN OF CARE
Problem: ANTEPARTUM  Goal: Maintain pregnancy as long as maternal and/or fetal condition is stable  Description: INTERVENTIONS:  - Maternal surveillance  - Fetal surveillance  - Monitor uterine activity  - Medications as ordered  - Bedrest  Outcome: Progressing     Problem: PAIN - ADULT  Goal: Verbalizes/displays adequate comfort level or baseline comfort level  Description: Interventions:  - Encourage patient to monitor pain and request assistance  - Assess pain using appropriate pain scale  - Administer analgesics based on type and severity of pain and evaluate response  - Implement non-pharmacological measures as appropriate and evaluate response  - Consider cultural and social influences on pain and pain management  - Notify physician/advanced practitioner if interventions unsuccessful or patient reports new pain  Outcome: Progressing     Problem: INFECTION - ADULT  Goal: Absence or prevention of progression during hospitalization  Description: INTERVENTIONS:  - Assess and monitor for signs and symptoms of infection  - Monitor lab/diagnostic results  - Monitor all insertion sites, i.e. indwelling lines, tubes, and drains  - Monitor endotracheal if appropriate and nasal secretions for changes in amount and color  - [place] appropriate cooling/warming therapies per order  - Administer medications as ordered  - Instruct and encourage patient and family to use good hand hygiene technique  - Identify and instruct in appropriate isolation precautions for identified infection/condition  Outcome: Progressing  Goal: Absence of fever/infection during neutropenic period  Description: INTERVENTIONS:  - Monitor WBC    Outcome: Progressing     Problem: SAFETY ADULT  Goal: Patient will remain free of falls  Description: INTERVENTIONS:  - Educate patient/family on patient safety including physical limitations  - Instruct patient to call for assistance with activity   - Consult OT/PT to assist with strengthening/mobility   -  Keep Call bell within reach  - Keep bed low and locked with side rails adjusted as appropriate  - Keep care items and personal belongings within reach  - Initiate and maintain comfort rounds  - Make Fall Risk Sign visible to staff  - Consider moving patient to room near nurses station  Outcome: Progressing  Goal: Maintain or return to baseline ADL function  Description: INTERVENTIONS:  -  Assess patient's ability to carry out ADLs; assess patient's baseline for ADL function and identify physical deficits which impact ability to perform ADLs (bathing, care of mouth/teeth, toileting, grooming, dressing, etc.)  - Assess/evaluate cause of self-care deficits   - Assess range of motion  - Assess patient's mobility; develop plan if impaired  - Assess patient's need for assistive devices and provide as appropriate  - Encourage maximum independence but intervene and supervise when necessary  - Involve family in performance of ADLs  - Assess for home care needs following discharge   - Consider OT consult to assist with ADL evaluation and planning for discharge  - Provide patient education as appropriate  Outcome: Progressing  Goal: Maintains/Returns to pre admission functional level  Description: INTERVENTIONS:  - Perform AM-PAC 6 Click Basic Mobility/ Daily Activity assessment daily.  - Set and communicate daily mobility goal to care team and patient/family/caregiver.   - Collaborate with rehabilitation services on mobility goals if consulted  - Out of bed for toileting  - Record patient progress and toleration of activity level   Outcome: Progressing     Problem: Knowledge Deficit  Goal: Patient/family/caregiver demonstrates understanding of disease process, treatment plan, medications, and discharge instructions  Description: Complete learning assessment and assess knowledge base.  Interventions:  - Provide teaching at level of understanding  - Provide teaching via preferred learning methods  Outcome: Progressing      Problem: DISCHARGE PLANNING  Goal: Discharge to home or other facility with appropriate resources  Description: INTERVENTIONS:  - Identify barriers to discharge w/patient and caregiver  - Arrange for needed discharge resources and transportation as appropriate  - Identify discharge learning needs (meds, wound care, etc.)  - Arrange for interpretive services to assist at discharge as needed  - Refer to Case Management Department for coordinating discharge planning if the patient needs post-hospital services based on physician/advanced practitioner order or complex needs related to functional status, cognitive ability, or social support system  Outcome: Progressing     Problem: Nutrition/Hydration-ADULT  Goal: Nutrient/Hydration intake appropriate for improving, restoring or maintaining nutritional needs  Description: Monitor and assess patient's nutrition/hydration status for malnutrition. Collaborate with interdisciplinary team and initiate plan and interventions as ordered.  Monitor patient's weight and dietary intake as ordered or per policy. Utilize nutrition screening tool and intervene as necessary. Determine patient's food preferences and provide high-protein, high-caloric foods as appropriate.     INTERVENTIONS:  - Monitor oral intake, urinary output, labs, and treatment plans  - Assess nutrition and hydration status and recommend course of action  - Evaluate amount of meals eaten  - Assist patient with eating if necessary   - Allow adequate time for meals  - Recommend/ encourage appropriate diets, oral nutritional supplements, and vitamin/mineral supplements  - Order, calculate, and assess calorie counts as needed  - Recommend, monitor, and adjust tube feedings and TPN/PPN based on assessed needs  - Assess need for intravenous fluids  - Provide specific nutrition/hydration education as appropriate  - Include patient/family/caregiver in decisions related to nutrition  Outcome: Progressing     Problem:  GASTROINTESTINAL - ADULT  Goal: Maintains adequate nutritional intake  Description: INTERVENTIONS:  - Monitor percentage of each meal consumed  - Identify factors contributing to decreased intake, treat as appropriate  - Assist with meals as needed  - Monitor I&O, weight, and lab values if indicated  - Obtain nutrition services referral as needed  Outcome: Progressing     Problem: METABOLIC, FLUID AND ELECTROLYTES - ADULT  Goal: Glucose maintained within target range  Description: INTERVENTIONS:  - Monitor Blood Glucose as ordered  - Assess for signs and symptoms of hyperglycemia and hypoglycemia  - Administer ordered medications to maintain glucose within target range  - Assess nutritional intake and initiate nutrition service referral as needed  Outcome: Progressing     Problem: METABOLIC, FLUID AND ELECTROLYTES - ADULT  Goal: Glucose maintained within target range  Description: INTERVENTIONS:  - Monitor Blood Glucose as ordered  - Assess for signs and symptoms of hyperglycemia and hypoglycemia  - Administer ordered medications to maintain glucose within target range  - Assess nutritional intake and initiate nutrition service referral as needed  6/19/2024 0044 by Fanny Light RN  Outcome: Not Progressing  6/19/2024 0044 by Fanny Light RN  Outcome: Progressing

## 2024-06-19 NOTE — PROGRESS NOTES
Vitals:    06/19/24 0540   BP: 127/73   Pulse: 96   Resp: 17   Temp: 98 °F (36.7 °C)   SpO2:      Patient with intermittent cramping with irregular ctx on monitor, at times q2-4m  SVE 0/0/-4  Patient declines Tylenol for analgesic benefit  Continue to monitor pain profile  Consider further monitoring on tocometry if contractions persist      David Desai MD  OB/GYN PGY-3

## 2024-06-19 NOTE — PLAN OF CARE
Problem: ANTEPARTUM  Goal: Maintain pregnancy as long as maternal and/or fetal condition is stable  Description: INTERVENTIONS:  - Maternal surveillance  - Fetal surveillance  - Monitor uterine activity  - Medications as ordered  - Bedrest  Outcome: Progressing     Problem: PAIN - ADULT  Goal: Verbalizes/displays adequate comfort level or baseline comfort level  Description: Interventions:  - Encourage patient to monitor pain and request assistance  - Assess pain using appropriate pain scale  - Administer analgesics based on type and severity of pain and evaluate response  - Implement non-pharmacological measures as appropriate and evaluate response  - Consider cultural and social influences on pain and pain management  - Notify physician/advanced practitioner if interventions unsuccessful or patient reports new pain  Outcome: Progressing     Problem: INFECTION - ADULT  Goal: Absence or prevention of progression during hospitalization  Description: INTERVENTIONS:  - Assess and monitor for signs and symptoms of infection  - Monitor lab/diagnostic results  - Monitor all insertion sites, i.e. indwelling lines, tubes, and drains  - Monitor endotracheal if appropriate and nasal secretions for changes in amount and color  - [place] appropriate cooling/warming therapies per order  - Administer medications as ordered  - Instruct and encourage patient and family to use good hand hygiene technique  - Identify and instruct in appropriate isolation precautions for identified infection/condition  Outcome: Progressing  Goal: Absence of fever/infection during neutropenic period  Description: INTERVENTIONS:  - Monitor WBC    Outcome: Progressing     Problem: SAFETY ADULT  Goal: Patient will remain free of falls  Description: INTERVENTIONS:  - Educate patient/family on patient safety including physical limitations  - Instruct patient to call for assistance with activity   - Consult OT/PT to assist with strengthening/mobility   -  Keep Call bell within reach  - Keep bed low and locked with side rails adjusted as appropriate  - Keep care items and personal belongings within reach  - Initiate and maintain comfort rounds  - Make Fall Risk Sign visible to staff  - Apply yellow socks and bracelet for high fall risk patients  - Consider moving patient to room near nurses station  Outcome: Progressing  Goal: Maintain or return to baseline ADL function  Description: INTERVENTIONS:  -  Assess patient's ability to carry out ADLs; assess patient's baseline for ADL function and identify physical deficits which impact ability to perform ADLs (bathing, care of mouth/teeth, toileting, grooming, dressing, etc.)  - Assess/evaluate cause of self-care deficits   - Assess range of motion  - Assess patient's mobility; develop plan if impaired  - Assess patient's need for assistive devices and provide as appropriate  - Encourage maximum independence but intervene and supervise when necessary  - Involve family in performance of ADLs  - Assess for home care needs following discharge   - Consider OT consult to assist with ADL evaluation and planning for discharge  - Provide patient education as appropriate  Outcome: Progressing  Goal: Maintains/Returns to pre admission functional level  Description: INTERVENTIONS:  - Perform AM-PAC 6 Click Basic Mobility/ Daily Activity assessment daily.  - Set and communicate daily mobility goal to care team and patient/family/caregiver.   - Collaborate with rehabilitation services on mobility goals if consulted  - Record patient progress and toleration of activity level   Outcome: Progressing     Problem: Knowledge Deficit  Goal: Patient/family/caregiver demonstrates understanding of disease process, treatment plan, medications, and discharge instructions  Description: Complete learning assessment and assess knowledge base.  Interventions:  - Provide teaching at level of understanding  - Provide teaching via preferred learning  methods  Outcome: Progressing     Problem: DISCHARGE PLANNING  Goal: Discharge to home or other facility with appropriate resources  Description: INTERVENTIONS:  - Identify barriers to discharge w/patient and caregiver  - Arrange for needed discharge resources and transportation as appropriate  - Identify discharge learning needs (meds, wound care, etc.)  - Arrange for interpretive services to assist at discharge as needed  - Refer to Case Management Department for coordinating discharge planning if the patient needs post-hospital services based on physician/advanced practitioner order or complex needs related to functional status, cognitive ability, or social support system  Outcome: Progressing     Problem: Nutrition/Hydration-ADULT  Goal: Nutrient/Hydration intake appropriate for improving, restoring or maintaining nutritional needs  Description: Monitor and assess patient's nutrition/hydration status for malnutrition. Collaborate with interdisciplinary team and initiate plan and interventions as ordered.  Monitor patient's weight and dietary intake as ordered or per policy. Utilize nutrition screening tool and intervene as necessary. Determine patient's food preferences and provide high-protein, high-caloric foods as appropriate.     INTERVENTIONS:  - Monitor oral intake, urinary output, labs, and treatment plans  - Assess nutrition and hydration status and recommend course of action  - Evaluate amount of meals eaten  - Assist patient with eating if necessary   - Allow adequate time for meals  - Recommend/ encourage appropriate diets, oral nutritional supplements, and vitamin/mineral supplements  - Order, calculate, and assess calorie counts as needed  - Recommend, monitor, and adjust tube feedings and TPN/PPN based on assessed needs  - Assess need for intravenous fluids  - Provide specific nutrition/hydration education as appropriate  - Include patient/family/caregiver in decisions related to  nutrition  Outcome: Progressing     Problem: GASTROINTESTINAL - ADULT  Goal: Maintains adequate nutritional intake  Description: INTERVENTIONS:  - Monitor percentage of each meal consumed  - Identify factors contributing to decreased intake, treat as appropriate  - Assist with meals as needed  - Monitor I&O, weight, and lab values if indicated  - Obtain nutrition services referral as needed  Outcome: Progressing     Problem: METABOLIC, FLUID AND ELECTROLYTES - ADULT  Goal: Glucose maintained within target range  Description: INTERVENTIONS:  - Monitor Blood Glucose as ordered  - Assess for signs and symptoms of hyperglycemia and hypoglycemia  - Administer ordered medications to maintain glucose within target range  - Assess nutritional intake and initiate nutrition service referral as needed  Outcome: Progressing

## 2024-06-20 LAB
GLUCOSE SERPL-MCNC: 103 MG/DL (ref 65–140)
GLUCOSE SERPL-MCNC: 128 MG/DL (ref 65–140)
GLUCOSE SERPL-MCNC: 134 MG/DL (ref 65–140)
GLUCOSE SERPL-MCNC: 73 MG/DL (ref 65–140)
GLUCOSE SERPL-MCNC: 85 MG/DL (ref 65–140)
GLUCOSE SERPL-MCNC: 88 MG/DL (ref 65–140)

## 2024-06-20 PROCEDURE — 99232 SBSQ HOSP IP/OBS MODERATE 35: CPT | Performed by: OBSTETRICS & GYNECOLOGY

## 2024-06-20 PROCEDURE — 59025 FETAL NON-STRESS TEST: CPT | Performed by: OBSTETRICS & GYNECOLOGY

## 2024-06-20 PROCEDURE — 82948 REAGENT STRIP/BLOOD GLUCOSE: CPT

## 2024-06-20 RX ADMIN — Medication 1 TABLET: at 21:55

## 2024-06-20 RX ADMIN — INSULIN LISPRO 40 UNITS: 100 INJECTION, SOLUTION INTRAVENOUS; SUBCUTANEOUS at 11:30

## 2024-06-20 RX ADMIN — INSULIN GLARGINE 132 UNITS: 100 INJECTION, SOLUTION SUBCUTANEOUS at 21:55

## 2024-06-20 RX ADMIN — ACETAMINOPHEN 975 MG: 325 TABLET, FILM COATED ORAL at 20:46

## 2024-06-20 RX ADMIN — ASPIRIN 81 MG 162 MG: 81 TABLET ORAL at 21:55

## 2024-06-20 RX ADMIN — INSULIN LISPRO 40 UNITS: 100 INJECTION, SOLUTION INTRAVENOUS; SUBCUTANEOUS at 07:35

## 2024-06-20 RX ADMIN — INSULIN LISPRO 40 UNITS: 100 INJECTION, SOLUTION INTRAVENOUS; SUBCUTANEOUS at 18:10

## 2024-06-20 NOTE — PLAN OF CARE
Problem: ANTEPARTUM  Goal: Maintain pregnancy as long as maternal and/or fetal condition is stable  Description: INTERVENTIONS:  - Maternal surveillance  - Fetal surveillance  - Monitor uterine activity  - Medications as ordered  - Bedrest  Outcome: Progressing     Problem: PAIN - ADULT  Goal: Verbalizes/displays adequate comfort level or baseline comfort level  Description: Interventions:  - Encourage patient to monitor pain and request assistance  - Assess pain using appropriate pain scale  - Administer analgesics based on type and severity of pain and evaluate response  - Implement non-pharmacological measures as appropriate and evaluate response  - Consider cultural and social influences on pain and pain management  - Notify physician/advanced practitioner if interventions unsuccessful or patient reports new pain  Outcome: Progressing     Problem: INFECTION - ADULT  Goal: Absence or prevention of progression during hospitalization  Description: INTERVENTIONS:  - Assess and monitor for signs and symptoms of infection  - Monitor lab/diagnostic results  - Monitor all insertion sites, i.e. indwelling lines, tubes, and drains  - Monitor endotracheal if appropriate and nasal secretions for changes in amount and color  - [place] appropriate cooling/warming therapies per order  - Administer medications as ordered  - Instruct and encourage patient and family to use good hand hygiene technique  - Identify and instruct in appropriate isolation precautions for identified infection/condition  Outcome: Progressing  Goal: Absence of fever/infection during neutropenic period  Description: INTERVENTIONS:  - Monitor WBC    Outcome: Progressing     Problem: SAFETY ADULT  Goal: Patient will remain free of falls  Description: INTERVENTIONS:  - Educate patient/family on patient safety including physical limitations  - Instruct patient to call for assistance with activity   - Consult OT/PT to assist with strengthening/mobility   -  Keep Call bell within reach  - Keep bed low and locked with side rails adjusted as appropriate  - Keep care items and personal belongings within reach  - Initiate and maintain comfort rounds  - Make Fall Risk Sign visible to staff  - Apply yellow socks and bracelet for high fall risk patients  - Consider moving patient to room near nurses station  Outcome: Progressing  Goal: Maintain or return to baseline ADL function  Description: INTERVENTIONS:  -  Assess patient's ability to carry out ADLs; assess patient's baseline for ADL function and identify physical deficits which impact ability to perform ADLs (bathing, care of mouth/teeth, toileting, grooming, dressing, etc.)  - Assess/evaluate cause of self-care deficits   - Assess range of motion  - Assess patient's mobility; develop plan if impaired  - Assess patient's need for assistive devices and provide as appropriate  - Encourage maximum independence but intervene and supervise when necessary  - Involve family in performance of ADLs  - Assess for home care needs following discharge   - Consider OT consult to assist with ADL evaluation and planning for discharge  - Provide patient education as appropriate  Outcome: Progressing  Goal: Maintains/Returns to pre admission functional level  Description: INTERVENTIONS:  - Perform AM-PAC 6 Click Basic Mobility/ Daily Activity assessment daily.  - Set and communicate daily mobility goal to care team and patient/family/caregiver.   - Collaborate with rehabilitation services on mobility goals if consulted  - Record patient progress and toleration of activity level   Outcome: Progressing     Problem: Knowledge Deficit  Goal: Patient/family/caregiver demonstrates understanding of disease process, treatment plan, medications, and discharge instructions  Description: Complete learning assessment and assess knowledge base.  Interventions:  - Provide teaching at level of understanding  - Provide teaching via preferred learning  methods  Outcome: Progressing     Problem: DISCHARGE PLANNING  Goal: Discharge to home or other facility with appropriate resources  Description: INTERVENTIONS:  - Identify barriers to discharge w/patient and caregiver  - Arrange for needed discharge resources and transportation as appropriate  - Identify discharge learning needs (meds, wound care, etc.)  - Arrange for interpretive services to assist at discharge as needed  - Refer to Case Management Department for coordinating discharge planning if the patient needs post-hospital services based on physician/advanced practitioner order or complex needs related to functional status, cognitive ability, or social support system  Outcome: Progressing     Problem: Nutrition/Hydration-ADULT  Goal: Nutrient/Hydration intake appropriate for improving, restoring or maintaining nutritional needs  Description: Monitor and assess patient's nutrition/hydration status for malnutrition. Collaborate with interdisciplinary team and initiate plan and interventions as ordered.  Monitor patient's weight and dietary intake as ordered or per policy. Utilize nutrition screening tool and intervene as necessary. Determine patient's food preferences and provide high-protein, high-caloric foods as appropriate.     INTERVENTIONS:  - Monitor oral intake, urinary output, labs, and treatment plans  - Assess nutrition and hydration status and recommend course of action  - Evaluate amount of meals eaten  - Assist patient with eating if necessary   - Allow adequate time for meals  - Recommend/ encourage appropriate diets, oral nutritional supplements, and vitamin/mineral supplements  - Order, calculate, and assess calorie counts as needed  - Recommend, monitor, and adjust tube feedings and TPN/PPN based on assessed needs  - Assess need for intravenous fluids  - Provide specific nutrition/hydration education as appropriate  - Include patient/family/caregiver in decisions related to  nutrition  Outcome: Progressing     Problem: GASTROINTESTINAL - ADULT  Goal: Maintains adequate nutritional intake  Description: INTERVENTIONS:  - Monitor percentage of each meal consumed  - Identify factors contributing to decreased intake, treat as appropriate  - Assist with meals as needed  - Monitor I&O, weight, and lab values if indicated  - Obtain nutrition services referral as needed  Outcome: Progressing

## 2024-06-20 NOTE — PROGRESS NOTES
Progress Note - Maternal Fetal Medicine   Verito Mata 39 y.o. female MRN: [id number]  Unit/Bed#: -01 Encounter: [id number]  Admit date: 2024.  Today's date: 24    Assessment & Plan     Ms. Mata is a 39 y.o.  at 33w4d, Hospital Day: 4, admitted with uncontrolled T2DM.  By issue:    * Pre-existing type 2 diabetes mellitus with hyperglycemia during pregnancy in third trimester (HCC)  Assessment & Plan  Lab Results   Component Value Date    HGBA1C 7.5 (H) 2024       Recent Labs     24  1456 24  2049 24  0205 24  0539   POCGLU 142* 111 73 88       Blood Sugar Average: Last 72 hrs:  (P) [id number]    Home Dexacom: for 7d PTA, sugars are at goal 31% of the time and above goal the rest of the time (never below goal)    Home regimen PTA:  - Lantus 80 U qhs  - Humalog 26 U TID w/ meals    : Lantus increased to 96 U qhs, Humalog increased to 32 U TID w/ meals  : Lantus increased to 110 U qhs, Humalog increased to 40 U TID w/ meals  : Lantus increased to 132 U qhs  CBC, CMP, beta hydroxybutyrate, & UA without evidence of DKA w/ AG 8    Recommend continued use of Dexacom inpatient  Recommend addition of POCT glucose fasting & 2 hr post-prandial  Consider insulin as above - adjust as needed    33 weeks gestation of pregnancy  Assessment & Plan  Overview:  Labs  Pap smear none on file; GC/CT neg  Prenatal panel notable for elevated 1 hr GTT  28w labs pending (collected on admission)  Vaccines:  Flu vaccine: due this flu season  Tdap vaccine: 24  Genetic screening: none completed  Contraception: Depo Provera  Feeding plan: breast & bottle  Birth plan:   Delivery consent: signed 24    Plan:  Continue LDASA  Daily PNV  FEN: diabetic diet  DVT ppx: SCDs  Monitoring: NST daily    History of smoking  Assessment & Plan  Smoked 1 ppd prior to pregnancy but quit when she learned she was pregnant.    H/O shoulder dystocia in prior pregnancy,  currently pregnant  Assessment & Plan  H/o shoulder dystocia in prior pregnancy with 7 lb fetus  Patient counseled that she is at increased risk for shoulder dystocia this pregnancy due to h/o shoulder dystocia, T2DM, suspected FGR w/ AC > HC    Monitor fetal growth & biometry this pregnancy  Highly anticipate need for  given high risk for shoulder dystocia this pregnancy (this can be re-evaluated pending GA & growth, though at 34w, this fetus is projected to be the same size of her prior fetus with whom she had a shoulder dystocia)    Elevated blood pressure reading without diagnosis of hypertension  Assessment & Plan  Single elevated BP of 144/84 at 30w.  Systolic (24hrs), Av , Min:100 , Max:131   Diastolic (24hrs), Av, Min:58, Max:79    Monitor BP this pregnancy    Excessive fetal growth affecting management of pregnancy in third trimester  Assessment & Plan  Level I US on 24 at 32w2d:  Transverse presentation  Anterior placenta  SAKINA 12.1 cm  EFW 5lb 9oz (98%), AC >99%, HC 13%    Obesity complicating pregnancy, childbirth, or puerperium, antepartum  Assessment & Plan  Pre-gravid BMI 36  Current BMI 43  TWG 17.4 kg (38 lb 6.4 oz)    SCDs & ambulation for DVT ppx  Recommend weekly weights to track weight gain in pregnancy  Recommended weight gain 11-20 lb    Multigravida of advanced maternal age in third trimester  Assessment & Plan  38 yo  No NIPS or MSAFP completed    Tetrahydrocannabinol (THC) use disorder, mild, abuse  Assessment & Plan  Smoked THC early in pregnancy but stopped once she learned she was pregnant.             Subjective    Contractions: no  Loss of fluid: no  Vaginal bleeding: no  Fetal movement: yes    Pain: no  Headaches: no  Visual changes: no  Chest pain: no  Shortness of breath: no  Nausea: no  Vomiting/Diarrhea: no  Dysuria: no  Leg pain: no          Objective      Patient Vitals for the past 24 hrs:   BP Temp Temp src Pulse Resp SpO2   24 0543 122/58 97.8 °F  (36.6 °C) Oral 91 18 --   06/19/24 2346 116/78 98.3 °F (36.8 °C) Oral 96 20 98 %   06/19/24 1609 100/63 97.9 °F (36.6 °C) Oral (!) 109 18 100 %   06/19/24 1217 127/72 97.9 °F (36.6 °C) Oral (!) 107 20 --   06/19/24 0744 131/79 98.1 °F (36.7 °C) Oral 101 20 --       Physical Exam  Constitutional:       General: She is not in acute distress.     Appearance: Normal appearance. She is not ill-appearing.   HENT:      Mouth/Throat:      Mouth: Mucous membranes are moist.   Eyes:      Extraocular Movements: Extraocular movements intact.   Cardiovascular:      Rate and Rhythm: Normal rate.   Pulmonary:      Effort: Pulmonary effort is normal.   Abdominal:      General: There is no distension.      Palpations: Abdomen is soft.      Tenderness: There is no abdominal tenderness. There is no guarding.   Musculoskeletal:         General: No swelling or tenderness.      Right lower leg: No edema.      Left lower leg: No edema.   Skin:     General: Skin is warm and dry.      Coloration: Skin is not jaundiced or pale.   Neurological:      General: No focal deficit present.      Mental Status: She is alert.   Psychiatric:         Mood and Affect: Mood normal.         Behavior: Behavior normal.         Thought Content: Thought content normal.         Judgment: Judgment normal.         I/O         06/18 0701  06/19 0700 06/19 0701  06/20 0700    IV Piggyback  1000    Total Intake(mL/kg)  1000 (8.6)    Net  +1000                  Invasive Devices       Peripheral Intravenous Line  Duration             Peripheral IV 06/17/24 Distal;Dorsal (posterior);Right Forearm 2 days              Line  Duration             Subcutaneous Right upper arm -- days                    Results from last 7 days   Lab Units 06/17/24  0949   WBC Thousand/uL 9.58   TOTAL NEUT ABS Thousands/µL 7.27   HEMOGLOBIN g/dL 12.3   MCV fL 82   PLATELETS Thousands/uL 208     Results from last 7 days   Lab Units 06/17/24  0949   POTASSIUM mmol/L 3.7   CHLORIDE mmol/L 105    CO2 mmol/L 21   BUN mg/dL 8   CREATININE mg/dL 0.48*   EGFR ml/min/1.73sq m 125     Results from last 7 days   Lab Units 24  0949   AST U/L 9*   ALT U/L 9     Results from last 7 days   Lab Units 24  0949   PLATELETS Thousands/uL 208     Results from last 7 days   Lab Units 24  0539 24  0205 24  2049 24  1456 24  0947 24  0543 24  2030 24  1359 24  0950 24  0550 24  2031 24  1530 24  0934   POC GLUCOSE mg/dl 88 73 111 142* 127 127 178* 212* 141* 144* 153* 197* 261*     Results from last 7 days   Lab Units 24  0949   PROT/CREAT RATIO UR  0.29*             Results from last 7 days   Lab Units 24  0954   URINE CULTURE  50,000-59,000 cfu/ml       Brief review of pertinent history:  Past Medical History:   Diagnosis Date    Anxiety     Depression     Ectopic pregnancy 2015    Miscarriage     , 2020 x 2    Type 2 diabetes mellitus affecting pregnancy in second trimester, antepartum      Past Surgical History:   Procedure Laterality Date    APPENDECTOMY      DIAGNOSTIC LAPAROSCOPY Left     ectopic- left (tube was cut, not removed per pt)    MULTIPLE TOOTH EXTRACTIONS  2006    all teeth removed     OB History    Para Term  AB Living   7 2 2 0 4 2   SAB IAB Ectopic Multiple Live Births   3 0 1 0 2      # Outcome Date GA Lbr Amaury/2nd Weight Sex Type Anes PTL Lv   7 Current            6 SAB 2020 10w0d    SAB      5 SAB 2020     SAB      4 2018     SAB      3 Ectopic 2015     ECTOPIC      2 Term 13 40w0d  3175 g (7 lb) F Vag-Spont EPI  TERRELL      Complications: Shoulder Dystocia   1 Term 06 40w0d  3175 g (7 lb) M Vag-Spont EPI  TERRELL       Fetal data:  Nonstress test: date 24 Time: 555 - 624  Baseline:  140 bpm  Variability: moderate  Accelerations: present, 15x15  Decelerations: absent  Contractions: absent  Assessment: reactive  Plan: continue NST qd      MFM ultrasound  report key findings:   Level I US on 6/11/24 at 32w2d:  Transverse presentation  Anterior placenta  SAKINA 12.1 cm  EFW 5lb 9oz (98%), AC >99%, HC 13%    MEDS:   Medication Administration - last 24 hours from 06/19/2024 0646 to 06/20/2024 0646         Date/Time Order Dose Route Action Action by     06/19/2024 1842 EDT acetaminophen (TYLENOL) tablet 975 mg 975 mg Oral Given Alexandra Chávez RN     06/19/2024 2151 EDT aspirin chewable tablet 162 mg 162 mg Oral Given Fanny Light RN     06/19/2024 2151 EDT prenatal multivitamin tablet 1 tablet 1 tablet Oral Given Fanny Light RN     06/19/2024 1837 EDT insulin lispro (HumALOG/ADMELOG) 100 units/mL subcutaneous injection 40 Units 40 Units Subcutaneous Given Alexandra Chávez RN     06/19/2024 1257 EDT insulin lispro (HumALOG/ADMELOG) 100 units/mL subcutaneous injection 40 Units 40 Units Subcutaneous Given Alexandra Chávez RN     06/19/2024 0722 EDT insulin lispro (HumALOG/ADMELOG) 100 units/mL subcutaneous injection 40 Units 40 Units Subcutaneous Given Alexandra Chávez RN     06/19/2024 0820 EDT lactated ringers bolus 1,000 mL 0 mL Intravenous Stopped Alexandra Chávez RN     06/19/2024 2151 EDT insulin glargine (LANTUS) subcutaneous injection 132 Units 1.32 mL 132 Units Subcutaneous Given Fanny Light RN          Current Facility-Administered Medications   Medication Dose Route Frequency    acetaminophen (TYLENOL) tablet 975 mg  975 mg Oral Q8H PRN    aspirin chewable tablet 162 mg  162 mg Oral HS    calcium carbonate (TUMS) chewable tablet 1,000 mg  1,000 mg Oral TID PRN    hydrOXYzine HCL (ATARAX) tablet 25 mg  25 mg Oral Q6H PRN    insulin glargine (LANTUS) subcutaneous injection 132 Units 1.32 mL  132 Units Subcutaneous HS    insulin lispro (HumALOG/ADMELOG) 100 units/mL subcutaneous injection 40 Units  40 Units Subcutaneous TID With Meals    melatonin tablet 3 mg  3 mg Oral HS PRN    ondansetron (ZOFRAN) injection 4 mg  4 mg Intravenous Q6H PRN    prenatal  multivitamin tablet 1 tablet  1 tablet Oral HS            Sray Desai MD  OBGYN, PGY-3  6/20/2024  6:46 AM

## 2024-06-21 ENCOUNTER — NURSE TRIAGE (OUTPATIENT)
Dept: OTHER | Facility: OTHER | Age: 39
End: 2024-06-21

## 2024-06-21 ENCOUNTER — TELEPHONE (OUTPATIENT)
Dept: LABOR AND DELIVERY | Facility: HOSPITAL | Age: 39
End: 2024-06-21

## 2024-06-21 VITALS
TEMPERATURE: 98.3 F | OXYGEN SATURATION: 99 % | BODY MASS INDEX: 43.74 KG/M2 | HEART RATE: 103 BPM | RESPIRATION RATE: 18 BRPM | SYSTOLIC BLOOD PRESSURE: 129 MMHG | DIASTOLIC BLOOD PRESSURE: 74 MMHG | WEIGHT: 256.2 LBS | HEIGHT: 64 IN

## 2024-06-21 DIAGNOSIS — O24.113 PRE-EXISTING TYPE 2 DIABETES MELLITUS WITH HYPERGLYCEMIA DURING PREGNANCY IN THIRD TRIMESTER (HCC): Primary | Chronic | ICD-10-CM

## 2024-06-21 DIAGNOSIS — E11.9 DIABETES (HCC): Primary | ICD-10-CM

## 2024-06-21 DIAGNOSIS — O24.911 DIABETES MELLITUS AFFECTING PREGNANCY, FIRST TRIMESTER: ICD-10-CM

## 2024-06-21 DIAGNOSIS — E11.65 PRE-EXISTING TYPE 2 DIABETES MELLITUS WITH HYPERGLYCEMIA DURING PREGNANCY IN THIRD TRIMESTER (HCC): Primary | Chronic | ICD-10-CM

## 2024-06-21 LAB
GLUCOSE SERPL-MCNC: 123 MG/DL (ref 65–140)
GLUCOSE SERPL-MCNC: 68 MG/DL (ref 65–140)
GLUCOSE SERPL-MCNC: 84 MG/DL (ref 65–140)

## 2024-06-21 PROCEDURE — 82948 REAGENT STRIP/BLOOD GLUCOSE: CPT

## 2024-06-21 PROCEDURE — 59025 FETAL NON-STRESS TEST: CPT | Performed by: OBSTETRICS & GYNECOLOGY

## 2024-06-21 PROCEDURE — NC001 PR NO CHARGE: Performed by: OBSTETRICS & GYNECOLOGY

## 2024-06-21 PROCEDURE — 99232 SBSQ HOSP IP/OBS MODERATE 35: CPT | Performed by: OBSTETRICS & GYNECOLOGY

## 2024-06-21 RX ORDER — INSULIN GLARGINE 100 [IU]/ML
132 INJECTION, SOLUTION SUBCUTANEOUS
Qty: 10 ML | Refills: 0 | Status: SHIPPED | OUTPATIENT
Start: 2024-06-21 | End: 2024-06-21

## 2024-06-21 RX ORDER — INSULIN LISPRO 100 [IU]/ML
32 INJECTION, SOLUTION INTRAVENOUS; SUBCUTANEOUS
Status: DISCONTINUED | OUTPATIENT
Start: 2024-06-21 | End: 2024-06-21 | Stop reason: HOSPADM

## 2024-06-21 RX ORDER — INSULIN GLARGINE 100 [IU]/ML
132 INJECTION, SOLUTION SUBCUTANEOUS
Qty: 10 ML | Refills: 3 | Status: SHIPPED | OUTPATIENT
Start: 2024-06-21 | End: 2024-06-21

## 2024-06-21 RX ORDER — INSULIN LISPRO 100 [IU]/ML
32 INJECTION, SOLUTION INTRAVENOUS; SUBCUTANEOUS
Qty: 10 ML | Refills: 2 | Status: SHIPPED | OUTPATIENT
Start: 2024-06-21 | End: 2024-06-21

## 2024-06-21 RX ORDER — INSULIN LISPRO 100 [IU]/ML
32 INJECTION, SOLUTION INTRAVENOUS; SUBCUTANEOUS
Qty: 10 ML | Refills: 2 | Status: SHIPPED | OUTPATIENT
Start: 2024-06-21 | End: 2024-07-21

## 2024-06-21 RX ORDER — INSULIN GLARGINE 100 [IU]/ML
132 INJECTION, SOLUTION SUBCUTANEOUS DAILY
Qty: 15 ML | Refills: 3 | Status: SHIPPED | OUTPATIENT
Start: 2024-06-21

## 2024-06-21 RX ADMIN — ACETAMINOPHEN 975 MG: 325 TABLET, FILM COATED ORAL at 08:57

## 2024-06-21 RX ADMIN — INSULIN LISPRO 40 UNITS: 100 INJECTION, SOLUTION INTRAVENOUS; SUBCUTANEOUS at 07:18

## 2024-06-21 NOTE — PLAN OF CARE
Problem: ANTEPARTUM  Goal: Maintain pregnancy as long as maternal and/or fetal condition is stable  Description: INTERVENTIONS:  - Maternal surveillance  - Fetal surveillance  - Monitor uterine activity  - Medications as ordered  - Bedrest  Outcome: Adequate for Discharge     Problem: PAIN - ADULT  Goal: Verbalizes/displays adequate comfort level or baseline comfort level  Description: Interventions:  - Encourage patient to monitor pain and request assistance  - Assess pain using appropriate pain scale  - Administer analgesics based on type and severity of pain and evaluate response  - Implement non-pharmacological measures as appropriate and evaluate response  - Consider cultural and social influences on pain and pain management  - Notify physician/advanced practitioner if interventions unsuccessful or patient reports new pain  Outcome: Adequate for Discharge     Problem: INFECTION - ADULT  Goal: Absence or prevention of progression during hospitalization  Description: INTERVENTIONS:  - Assess and monitor for signs and symptoms of infection  - Monitor lab/diagnostic results  - Monitor all insertion sites, i.e. indwelling lines, tubes, and drains  - Monitor endotracheal if appropriate and nasal secretions for changes in amount and color  - [place] appropriate cooling/warming therapies per order  - Administer medications as ordered  - Instruct and encourage patient and family to use good hand hygiene technique  - Identify and instruct in appropriate isolation precautions for identified infection/condition  Outcome: Adequate for Discharge  Goal: Absence of fever/infection during neutropenic period  Description: INTERVENTIONS:  - Monitor WBC    Outcome: Adequate for Discharge     Problem: SAFETY ADULT  Goal: Patient will remain free of falls  Description: INTERVENTIONS:  - Educate patient/family on patient safety including physical limitations  - Instruct patient to call for assistance with activity   - Consult  OT/PT to assist with strengthening/mobility   - Keep Call bell within reach  - Keep bed low and locked with side rails adjusted as appropriate  - Keep care items and personal belongings within reach  - Initiate and maintain comfort rounds  - Make Fall Risk Sign visible to staff  - Apply yellow socks and bracelet for high fall risk patients  - Consider moving patient to room near nurses station  Outcome: Adequate for Discharge  Goal: Maintain or return to baseline ADL function  Description: INTERVENTIONS:  -  Assess patient's ability to carry out ADLs; assess patient's baseline for ADL function and identify physical deficits which impact ability to perform ADLs (bathing, care of mouth/teeth, toileting, grooming, dressing, etc.)  - Assess/evaluate cause of self-care deficits   - Assess range of motion  - Assess patient's mobility; develop plan if impaired  - Assess patient's need for assistive devices and provide as appropriate  - Encourage maximum independence but intervene and supervise when necessary  - Involve family in performance of ADLs  - Assess for home care needs following discharge   - Consider OT consult to assist with ADL evaluation and planning for discharge  - Provide patient education as appropriate  Outcome: Adequate for Discharge  Goal: Maintains/Returns to pre admission functional level  Description: INTERVENTIONS:  - Perform AM-PAC 6 Click Basic Mobility/ Daily Activity assessment daily.  - Set and communicate daily mobility goal to care team and patient/family/caregiver.   - Collaborate with rehabilitation services on mobility goals if consulted  - Record patient progress and toleration of activity level   Outcome: Adequate for Discharge     Problem: Knowledge Deficit  Goal: Patient/family/caregiver demonstrates understanding of disease process, treatment plan, medications, and discharge instructions  Description: Complete learning assessment and assess knowledge base.  Interventions:  - Provide  teaching at level of understanding  - Provide teaching via preferred learning methods  Outcome: Adequate for Discharge     Problem: DISCHARGE PLANNING  Goal: Discharge to home or other facility with appropriate resources  Description: INTERVENTIONS:  - Identify barriers to discharge w/patient and caregiver  - Arrange for needed discharge resources and transportation as appropriate  - Identify discharge learning needs (meds, wound care, etc.)  - Arrange for interpretive services to assist at discharge as needed  - Refer to Case Management Department for coordinating discharge planning if the patient needs post-hospital services based on physician/advanced practitioner order or complex needs related to functional status, cognitive ability, or social support system  Outcome: Adequate for Discharge     Problem: Nutrition/Hydration-ADULT  Goal: Nutrient/Hydration intake appropriate for improving, restoring or maintaining nutritional needs  Description: Monitor and assess patient's nutrition/hydration status for malnutrition. Collaborate with interdisciplinary team and initiate plan and interventions as ordered.  Monitor patient's weight and dietary intake as ordered or per policy. Utilize nutrition screening tool and intervene as necessary. Determine patient's food preferences and provide high-protein, high-caloric foods as appropriate.     INTERVENTIONS:  - Monitor oral intake, urinary output, labs, and treatment plans  - Assess nutrition and hydration status and recommend course of action  - Evaluate amount of meals eaten  - Assist patient with eating if necessary   - Allow adequate time for meals  - Recommend/ encourage appropriate diets, oral nutritional supplements, and vitamin/mineral supplements  - Order, calculate, and assess calorie counts as needed  - Recommend, monitor, and adjust tube feedings and TPN/PPN based on assessed needs  - Assess need for intravenous fluids  - Provide specific nutrition/hydration  education as appropriate  - Include patient/family/caregiver in decisions related to nutrition  Outcome: Adequate for Discharge     Problem: GASTROINTESTINAL - ADULT  Goal: Maintains adequate nutritional intake  Description: INTERVENTIONS:  - Monitor percentage of each meal consumed  - Identify factors contributing to decreased intake, treat as appropriate  - Assist with meals as needed  - Monitor I&O, weight, and lab values if indicated  - Obtain nutrition services referral as needed  Outcome: Adequate for Discharge     Problem: METABOLIC, FLUID AND ELECTROLYTES - ADULT  Goal: Glucose maintained within target range  Description: INTERVENTIONS:  - Monitor Blood Glucose as ordered  - Assess for signs and symptoms of hyperglycemia and hypoglycemia  - Administer ordered medications to maintain glucose within target range  - Assess nutritional intake and initiate nutrition service referral as needed  Outcome: Adequate for Discharge

## 2024-06-21 NOTE — UTILIZATION REVIEW
NOTIFICATION OF ADMISSION DISCHARGE   This is a Notification of Discharge from [facility]. Please be advised that this patient has been discharge from our facility. Below you will find the admission and discharge date and time including the patient’s disposition.   UTILIZATION REVIEW CONTACT:  Carmen Cain  Utilization   Network Utilization Review Department  Phone: [phone] x carefully listen to the prompts. All voicemails are confidential.  Email: [email or web address]     ADMISSION INFORMATION  PRESENTATION DATE: 6/17/2024  8:11 AM  OBERVATION ADMISSION DATE:   INPATIENT ADMISSION DATE: 6/17/24  9:56 AM   DISCHARGE DATE: 6/21/2024 10:33 AM   DISPOSITION:Home/Self Care    Network Utilization Review Department  ATTENTION: Please call with any questions or concerns to [phone] and carefully listen to the prompts so that you are directed to the right person. All voicemails are confidential.   For Discharge needs, contact Care Management DC Support Team at [phone] opt. 2  Send all requests for admission clinical reviews, approved or denied determinations and any other requests to dedicated fax number below belonging to the campus where the patient is receiving treatment. List of dedicated fax numbers for the Facilities:  FACILITY NAME UR FAX NUMBER   ADMISSION DENIALS (Administrative/Medical Necessity) [phone]   DISCHARGE SUPPORT TEAM ([facility]) [phone]   PARENT CHILD HEALTH (Maternity/NICU/Pediatrics) [phone]   [facility] [phone]   [facility] [phone]   [facility], [facility] [phone]   [facility] [phone]   [facility] [phone]   [facility] [phone]   [facility] [phone]   [facility] [phone]   [facility]  [facility] [phone]   [facility] [phone]   [facility] [phone]   [facility] [phone]

## 2024-06-21 NOTE — DISCHARGE SUMMARY
"Discharge Summary   Verito Mata MRN: [id number]  Unit/Bed#: -01 Encounter: [id number]      Admission Date: 2024     Discharge Date: 24    Attending: Kathryn Burch DO    Principal Diagnosis:  Encounter for supervision of normal pregnancy, unspecified, unspecified trimester [Z34.90]    Procedures: N/A    Hospital course:   Verito Mata is a 40 yo  who was initially admitted at 33w1d for sugar control in the setting of poorly controlled T2DM.  She was on both long and short acting insulin prior to admission, and during her admission, these were titrated up for better sugar control.  Labs showed no evidence of DKA on admission.  Ultimately she was discharged on Lantus 132 U qhs and Humalog 32 U TID with meals.  She had no obstetric complaints during her admission.  On day of discharge, she was feeling well and completing all her ADLs without difficulty.    Lab Results:   Lab Results   Component Value Date    WBC 9.58 2024    HGB 12.3 2024    HCT 37.4 2024    MCV 82 2024     2024     Lab Results   Component Value Date    CALCIUM 8.9 2024    K 3.7 2024    CO2 21 2024     2024    BUN 8 2024    CREATININE 0.48 (L) 2024     Lab Results   Component Value Date/Time    POCGLU 68 2024 08:54 AM    POCGLU 123 2024 06:43 AM    POCGLU 84 2024 02:30 AM    POCGLU 128 2024 10:13 PM    POCGLU 85 2024 08:50 PM     No results found for: \"PTT\"  No results found for: \"INR\", \"PROTIME\"    Complications: none apparent    Condition at discharge: stable     Discharge instructions/Information to patient and family:   See After Visit Summary for information provided to patient and family.      Provisions for Follow-Up Care:  See After Visit Summary for information related to follow-up care and any pertinent home health orders.      Disposition: See After Visit Summary for discharge disposition " information.    Planned Readmission: No    Discharge Medications:  For a complete list of the patient's medications, please refer to her med rec.        Sary Desai MD  6/21/2024  9:54 AM

## 2024-06-21 NOTE — PROGRESS NOTES
Progress Note - Maternal Fetal Medicine   Verito Mata 39 y.o. female MRN: [id number]  Unit/Bed#: -01 Encounter: [id number]  Admit date: 2024.  Today's date: 24    Assessment & Plan     Ms. Mata is a 39 y.o.  at 33w5d, Hospital Day: 5, admitted with uncontrolled T2DM.  By issue:    * Pre-existing type 2 diabetes mellitus with hyperglycemia during pregnancy in third trimester (HCC)  Assessment & Plan  Lab Results   Component Value Date    HGBA1C 7.5 (H) 2024       Recent Labs     24  2050 24  2213 24  0230 24  0643   POCGLU 85 128 84 123       Blood Sugar Average: Last 72 hrs:  (P) 125    Home Dexacom: for 7d PTA, sugars are at goal 31% of the time and above goal the rest of the time (never below goal)    Home regimen PTA:  - Lantus 80 U qhs  - Humalog 26 U TID w/ meals    : Lantus increased to 96 U qhs, Humalog increased to 32 U TID w/ meals  : Lantus increased to 110 U qhs, Humalog increased to 40 U TID w/ meals  : Lantus increased to 132 U qhs  CBC, CMP, beta hydroxybutyrate, & UA without evidence of DKA w/ AG 8    Recommend continued use of Dexacom inpatient  Recommend addition of POCT glucose fasting & 2 hr post-prandial  Consider insulin as above - adjust as needed    33 weeks gestation of pregnancy  Assessment & Plan  Overview:  Labs  Pap smear none on file; GC/CT neg  Prenatal panel notable for elevated 1 hr GTT  28w labs pending (collected on admission)  Vaccines:  Flu vaccine: due this flu season  Tdap vaccine: 24  Genetic screening: none completed  Contraception: Depo Provera  Feeding plan: breast & bottle  Birth plan:   Delivery consent: signed 24    Plan:  Continue LDASA  Daily PNV  FEN: diabetic diet  DVT ppx: SCDs  Monitoring: NST daily    History of smoking  Assessment & Plan  Smoked 1 ppd prior to pregnancy but quit when she learned she was pregnant.    H/O shoulder dystocia in prior pregnancy, currently  pregnant  Assessment & Plan  H/o shoulder dystocia in prior pregnancy with 7 lb fetus  Patient counseled that she is at increased risk for shoulder dystocia this pregnancy due to h/o shoulder dystocia, T2DM, suspected FGR w/ AC > HC    Monitor fetal growth & biometry this pregnancy  Highly anticipate need for  given high risk for shoulder dystocia this pregnancy (this can be re-evaluated pending GA & growth, though at 34w, this fetus is projected to be the same size of her prior fetus with whom she had a shoulder dystocia)    Elevated blood pressure reading without diagnosis of hypertension  Assessment & Plan  Single elevated BP of 144/84 at 30w.  Systolic (24hrs), Av , Min:115 , Max:133   Diastolic (24hrs), Av, Min:62, Max:76    Monitor BP this pregnancy    Excessive fetal growth affecting management of pregnancy in third trimester  Assessment & Plan  Level I US on 24 at 32w2d:  Transverse presentation  Anterior placenta  SAKINA 12.1 cm  EFW 5lb 9oz (98%), AC >99%, HC 13%    Obesity complicating pregnancy, childbirth, or puerperium, antepartum  Assessment & Plan  Pre-gravid BMI 36  Current BMI 43  TWG 17.4 kg (38 lb 6.4 oz)    SCDs & ambulation for DVT ppx  Recommend weekly weights to track weight gain in pregnancy  Recommended weight gain 11-20 lb    Multigravida of advanced maternal age in third trimester  Assessment & Plan  40 yo  No NIPS or MSAFP completed    Tetrahydrocannabinol (THC) use disorder, mild, abuse  Assessment & Plan  Smoked THC early in pregnancy but stopped once she learned she was pregnant.             Subjective    Overnight, Verito felt like her sugar was low around 4 am, at which time her Dexacom measured in the 60s.  She ate a snack at that time.  She also did confirm she ate a snack around 9:30 pm last night.    Contractions: no  Loss of fluid: no  Vaginal bleeding: no  Fetal movement: yes    Pain: no  Headaches: no  Visual changes: no  Chest pain: no  Shortness of  breath: no  Nausea: no  Vomiting/Diarrhea: no  Dysuria: no  Leg pain: no          Objective      Patient Vitals for the past 24 hrs:   BP Temp Temp src Pulse Resp SpO2   06/21/24 0415 125/62 97.9 °F (36.6 °C) Oral 101 18 98 %   06/20/24 2339 115/65 98.5 °F (36.9 °C) Oral 99 18 100 %   06/20/24 2037 119/68 98.1 °F (36.7 °C) Axillary 97 16 --   06/20/24 1627 119/66 97.6 °F (36.4 °C) Oral 89 16 --   06/20/24 1208 121/76 98.2 °F (36.8 °C) Oral (!) 107 18 96 %   06/20/24 0743 133/69 98 °F (36.7 °C) Oral 101 18 97 %       Physical Exam  Constitutional:       General: She is not in acute distress.     Appearance: Normal appearance. She is not ill-appearing.   HENT:      Mouth/Throat:      Mouth: Mucous membranes are moist.   Eyes:      Extraocular Movements: Extraocular movements intact.   Cardiovascular:      Rate and Rhythm: Normal rate.   Pulmonary:      Effort: Pulmonary effort is normal.   Abdominal:      General: There is no distension.      Palpations: Abdomen is soft.      Tenderness: There is no abdominal tenderness. There is no guarding.   Musculoskeletal:         General: No swelling or tenderness.      Right lower leg: No edema.      Left lower leg: No edema.   Skin:     General: Skin is warm and dry.      Coloration: Skin is not jaundiced or pale.   Neurological:      General: No focal deficit present.      Mental Status: She is alert.   Psychiatric:         Mood and Affect: Mood normal.         Behavior: Behavior normal.         Thought Content: Thought content normal.         Judgment: Judgment normal.         I/O         06/19 0701  06/20 0700 06/20 0701  06/21 0700    IV Piggyback 1000     Total Intake(mL/kg) 1000 (8.6)     Net +1000                   Invasive Devices       Peripheral Intravenous Line  Duration             Peripheral IV 06/17/24 Distal;Dorsal (posterior);Right Forearm 3 days              Line  Duration             Subcutaneous Right upper arm -- days                    Results from last 7  days   Lab Units 24  0949   WBC Thousand/uL 9.58   TOTAL NEUT ABS Thousands/µL 7.27   HEMOGLOBIN g/dL 12.3   MCV fL 82   PLATELETS Thousands/uL 208     Results from last 7 days   Lab Units 24  0949   POTASSIUM mmol/L 3.7   CHLORIDE mmol/L 105   CO2 mmol/L 21   BUN mg/dL 8   CREATININE mg/dL 0.48*   EGFR ml/min/1.73sq m 125     Results from last 7 days   Lab Units 24  0949   AST U/L 9*   ALT U/L 9     Results from last 7 days   Lab Units 24  0949   PLATELETS Thousands/uL 208     Results from last 7 days   Lab Units 24  0643 24  0230 24  2213 24  2050 24  1327 24  0942 24  0539 24  0205 24  2049 24  1456 24  0947 24  0543 24  2030 24  1359 24  0950   POC GLUCOSE mg/dl 123 84 128 85 134 103 88 73 111 142* 127 127 178* 212* 141*     Results from last 7 days   Lab Units 24  0949   PROT/CREAT RATIO UR  0.29*             Results from last 7 days   Lab Units 24  0954   URINE CULTURE  50,000-59,000 cfu/ml       Brief review of pertinent history:  Past Medical History:   Diagnosis Date    Anxiety     Depression     Ectopic pregnancy 2015    Miscarriage     , 2020 x 2    Type 2 diabetes mellitus affecting pregnancy in second trimester, antepartum      Past Surgical History:   Procedure Laterality Date    APPENDECTOMY  2014    DIAGNOSTIC LAPAROSCOPY Left     ectopic- left (tube was cut, not removed per pt)    MULTIPLE TOOTH EXTRACTIONS  2006    all teeth removed     OB History    Para Term  AB Living   7 2 2 0 4 2   SAB IAB Ectopic Multiple Live Births   3 0 1 0 2      # Outcome Date GA Lbr Amaury/2nd Weight Sex Type Anes PTL Lv   7 Current            6 2020 10w0d    SAB      5 SAB 2020     SAB      4 2018     SAB      3 Ectopic 2015     ECTOPIC      2 Term 13 40w0d  3175 g (7 lb) F Vag-Spont EPI  TERRELL      Complications: Shoulder Dystocia   1 Term 06 40w0d   3175 g (7 lb) M Vag-Spont EPI  TERRELL       Fetal data:  Nonstress test: date 06/21/24 Time: 0610 - 0645  Baseline:  140 bpm  Variability: moderate  Accelerations: present, 15x15  Decelerations: absent  Contractions: absent  Assessment: reactive  Plan:  continue NST daily and PRN      MFM ultrasound report key findings:   Level I US on 6/11/24 at 32w2d:  Transverse presentation  Anterior placenta  SAKINA 12.1 cm  EFW 5lb 9oz (98%), AC >99%, HC 13%    MEDS:   Medication Administration - last 24 hours from 06/20/2024 0650 to 06/21/2024 0650         Date/Time Order Dose Route Action Action by     06/20/2024 2046 EDT acetaminophen (TYLENOL) tablet 975 mg 975 mg Oral Given Corby Perla RN     06/20/2024 2155 EDT aspirin chewable tablet 162 mg 162 mg Oral Given Corby Perla RN     06/20/2024 2155 EDT prenatal multivitamin tablet 1 tablet 1 tablet Oral Given Corby Perla RN     06/20/2024 1810 EDT insulin lispro (HumALOG/ADMELOG) 100 units/mL subcutaneous injection 40 Units 40 Units Subcutaneous Given Sandra Santiago RN     06/20/2024 1230 EDT insulin lispro (HumALOG/ADMELOG) 100 units/mL subcutaneous injection 40 Units 40 Units Subcutaneous Not Given Sheila Melo RN     06/20/2024 1130 EDT insulin lispro (HumALOG/ADMELOG) 100 units/mL subcutaneous injection 40 Units 40 Units Subcutaneous Given Sheila Melo RN     06/20/2024 0735 EDT insulin lispro (HumALOG/ADMELOG) 100 units/mL subcutaneous injection 40 Units 40 Units Subcutaneous Given Sheila Melo RN     06/20/2024 2155 EDT insulin glargine (LANTUS) subcutaneous injection 132 Units 1.32 mL 132 Units Subcutaneous Given Corby Perla RN          Current Facility-Administered Medications   Medication Dose Route Frequency    acetaminophen (TYLENOL) tablet 975 mg  975 mg Oral Q8H PRN    aspirin chewable tablet 162 mg  162 mg Oral HS    calcium carbonate (TUMS) chewable tablet 1,000 mg  1,000 mg Oral TID PRN    hydrOXYzine HCL (ATARAX) tablet 25 mg  25 mg Oral  Q6H PRN    insulin glargine (LANTUS) subcutaneous injection 132 Units 1.32 mL  132 Units Subcutaneous HS    insulin lispro (HumALOG/ADMELOG) 100 units/mL subcutaneous injection 40 Units  40 Units Subcutaneous TID With Meals    melatonin tablet 3 mg  3 mg Oral HS PRN    ondansetron (ZOFRAN) injection 4 mg  4 mg Intravenous Q6H PRN    prenatal multivitamin tablet 1 tablet  1 tablet Oral HS            Sary Desai MD  OBGYN, PGY-3  6/21/2024  6:50 AM

## 2024-06-21 NOTE — PLAN OF CARE
Problem: ANTEPARTUM  Goal: Maintain pregnancy as long as maternal and/or fetal condition is stable  Description: INTERVENTIONS:  - Maternal surveillance  - Fetal surveillance  - Monitor uterine activity  - Medications as ordered  - Bedrest  Outcome: Progressing     Problem: PAIN - ADULT  Goal: Verbalizes/displays adequate comfort level or baseline comfort level  Description: Interventions:  - Encourage patient to monitor pain and request assistance  - Assess pain using appropriate pain scale  - Administer analgesics based on type and severity of pain and evaluate response  - Implement non-pharmacological measures as appropriate and evaluate response  - Consider cultural and social influences on pain and pain management  - Notify physician/advanced practitioner if interventions unsuccessful or patient reports new pain  Outcome: Progressing     Problem: INFECTION - ADULT  Goal: Absence or prevention of progression during hospitalization  Description: INTERVENTIONS:  - Assess and monitor for signs and symptoms of infection  - Monitor lab/diagnostic results  - Monitor all insertion sites, i.e. indwelling lines, tubes, and drains  - Monitor endotracheal if appropriate and nasal secretions for changes in amount and color  - [place] appropriate cooling/warming therapies per order  - Administer medications as ordered  - Instruct and encourage patient and family to use good hand hygiene technique  - Identify and instruct in appropriate isolation precautions for identified infection/condition  Outcome: Progressing  Goal: Absence of fever/infection during neutropenic period  Description: INTERVENTIONS:  - Monitor WBC    Outcome: Progressing     Problem: SAFETY ADULT  Goal: Patient will remain free of falls  Description: INTERVENTIONS:  - Educate patient/family on patient safety including physical limitations  - Instruct patient to call for assistance with activity   - Consult OT/PT to assist with strengthening/mobility   -  Keep Call bell within reach  - Keep bed low and locked with side rails adjusted as appropriate  - Keep care items and personal belongings within reach  - Initiate and maintain comfort rounds  - Make Fall Risk Sign visible to staff  - Apply yellow socks and bracelet for high fall risk patients  - Consider moving patient to room near nurses station  Outcome: Progressing  Goal: Maintain or return to baseline ADL function  Description: INTERVENTIONS:  -  Assess patient's ability to carry out ADLs; assess patient's baseline for ADL function and identify physical deficits which impact ability to perform ADLs (bathing, care of mouth/teeth, toileting, grooming, dressing, etc.)  - Assess/evaluate cause of self-care deficits   - Assess range of motion  - Assess patient's mobility; develop plan if impaired  - Assess patient's need for assistive devices and provide as appropriate  - Encourage maximum independence but intervene and supervise when necessary  - Involve family in performance of ADLs  - Assess for home care needs following discharge   - Consider OT consult to assist with ADL evaluation and planning for discharge  - Provide patient education as appropriate  Outcome: Progressing  Goal: Maintains/Returns to pre admission functional level  Description: INTERVENTIONS:  - Perform AM-PAC 6 Click Basic Mobility/ Daily Activity assessment daily.  - Set and communicate daily mobility goal to care team and patient/family/caregiver.   - Collaborate with rehabilitation services on mobility goals if consulted  - Record patient progress and toleration of activity level   Outcome: Progressing     Problem: Knowledge Deficit  Goal: Patient/family/caregiver demonstrates understanding of disease process, treatment plan, medications, and discharge instructions  Description: Complete learning assessment and assess knowledge base.  Interventions:  - Provide teaching at level of understanding  - Provide teaching via preferred learning  methods  Outcome: Progressing     Problem: DISCHARGE PLANNING  Goal: Discharge to home or other facility with appropriate resources  Description: INTERVENTIONS:  - Identify barriers to discharge w/patient and caregiver  - Arrange for needed discharge resources and transportation as appropriate  - Identify discharge learning needs (meds, wound care, etc.)  - Arrange for interpretive services to assist at discharge as needed  - Refer to Case Management Department for coordinating discharge planning if the patient needs post-hospital services based on physician/advanced practitioner order or complex needs related to functional status, cognitive ability, or social support system  Outcome: Progressing     Problem: Nutrition/Hydration-ADULT  Goal: Nutrient/Hydration intake appropriate for improving, restoring or maintaining nutritional needs  Description: Monitor and assess patient's nutrition/hydration status for malnutrition. Collaborate with interdisciplinary team and initiate plan and interventions as ordered.  Monitor patient's weight and dietary intake as ordered or per policy. Utilize nutrition screening tool and intervene as necessary. Determine patient's food preferences and provide high-protein, high-caloric foods as appropriate.     INTERVENTIONS:  - Monitor oral intake, urinary output, labs, and treatment plans  - Assess nutrition and hydration status and recommend course of action  - Evaluate amount of meals eaten  - Assist patient with eating if necessary   - Allow adequate time for meals  - Recommend/ encourage appropriate diets, oral nutritional supplements, and vitamin/mineral supplements  - Order, calculate, and assess calorie counts as needed  - Recommend, monitor, and adjust tube feedings and TPN/PPN based on assessed needs  - Assess need for intravenous fluids  - Provide specific nutrition/hydration education as appropriate  - Include patient/family/caregiver in decisions related to  nutrition  Outcome: Progressing     Problem: GASTROINTESTINAL - ADULT  Goal: Maintains adequate nutritional intake  Description: INTERVENTIONS:  - Monitor percentage of each meal consumed  - Identify factors contributing to decreased intake, treat as appropriate  - Assist with meals as needed  - Monitor I&O, weight, and lab values if indicated  - Obtain nutrition services referral as needed  Outcome: Progressing     Problem: METABOLIC, FLUID AND ELECTROLYTES - ADULT  Goal: Glucose maintained within target range  Description: INTERVENTIONS:  - Monitor Blood Glucose as ordered  - Assess for signs and symptoms of hyperglycemia and hypoglycemia  - Administer ordered medications to maintain glucose within target range  - Assess nutritional intake and initiate nutrition service referral as needed  Outcome: Progressing

## 2024-06-21 NOTE — QUICK NOTE
I went to see the patient to review the plan of care.  She does not have any questions at this time and is comfortable with the plan for outpatient management of blood glucose.  She has an appointment with ANDREA on 6/25. Encouraged her to keep that appointment.

## 2024-06-21 NOTE — DISCHARGE INSTRUCTIONS
You will need  fetal surveillance moving forward in this pregnancy with an NST twice a week and an SAKINA (fluid check) once a week.  Please reach out to the diabetes educator approximately 1 week after discharge to review your sugars.

## 2024-06-22 NOTE — TELEPHONE ENCOUNTER
Reason for Disposition  • Caller has already spoken with the PCP and has no further questions.    Protocols used: No Contact or Duplicate Contact Call-ADULT-

## 2024-06-22 NOTE — TELEPHONE ENCOUNTER
"Regardin weeks pregnnat / medication problem  ----- Message from Meghna MARTINEZ sent at 2024  7:57 PM EDT -----  \"I am 34 weeks pregnant and I am having trouble getting insulin glargine (Lantus) 100 units/mL subcutaneous injection . I would like it resent to the pharmacy\"    Please send to     [facility]/pharmacy #[id number] - [address] [address]  Phone: [phone]  Fax: [phone]    "

## 2024-06-22 NOTE — PROGRESS NOTES
Please refer to the [facility] ultrasound report in Ob Procedures for additional information regarding today's visit

## 2024-06-22 NOTE — TELEPHONE ENCOUNTER
Patient had called due to inability to acquire Lantus prescription from pharmacy. I spoke to pharmacy and was able to submit Lantus prescription. Informed patient that prescription available for pick-up tonight.     Alexandra Snyder MD  Obstetrics & Gynecology, PGY-2

## 2024-06-24 NOTE — UTILIZATION REVIEW
NOTIFICATION OF ADMISSION DISCHARGE   This is a Notification of Discharge from [facility]. Please be advised that this patient has been discharge from our facility. Below you will find the admission and discharge date and time including the patient’s disposition.   UTILIZATION REVIEW CONTACT:  Carmen Cain  Utilization   Network Utilization Review Department  Phone: [phone] x carefully listen to the prompts. All voicemails are confidential.  Email: [email or web address]     ADMISSION INFORMATION  PRESENTATION DATE: 6/17/2024  8:11 AM  OBERVATION ADMISSION DATE:   INPATIENT ADMISSION DATE: 6/17/24  9:56 AM   DISCHARGE DATE: 6/21/2024 10:33 AM   DISPOSITION:PRA - Home    Network Utilization Review Department  ATTENTION: Please call with any questions or concerns to [phone] and carefully listen to the prompts so that you are directed to the right person. All voicemails are confidential.   For Discharge needs, contact Care Management DC Support Team at [phone] opt. 2  Send all requests for admission clinical reviews, approved or denied determinations and any other requests to dedicated fax number below belonging to the campus where the patient is receiving treatment. List of dedicated fax numbers for the Facilities:  FACILITY NAME UR FAX NUMBER   ADMISSION DENIALS (Administrative/Medical Necessity) [phone]   DISCHARGE SUPPORT TEAM ([facility]) [phone]   PARENT CHILD HEALTH (Maternity/NICU/Pediatrics) [phone]   [facility] [phone]   [facility] [phone]   [facility] [phone]   [facility] [phone]   Critical access hospital [phone]   [facility] [phone]   [facility] [phone]   [facility] [phone]   [facility]  [facility] [phone]   [facility] [phone]   [facility] [phone]   [facility] [place] [phone]

## 2024-06-25 ENCOUNTER — ULTRASOUND (OUTPATIENT)
Dept: PERINATAL CARE | Facility: OTHER | Age: 39
End: 2024-06-25
Payer: COMMERCIAL

## 2024-06-25 ENCOUNTER — ROUTINE PRENATAL (OUTPATIENT)
Dept: OBGYN CLINIC | Facility: MEDICAL CENTER | Age: 39
End: 2024-06-25

## 2024-06-25 VITALS
DIASTOLIC BLOOD PRESSURE: 80 MMHG | BODY MASS INDEX: 44.53 KG/M2 | WEIGHT: 259.4 LBS | OXYGEN SATURATION: 97 % | HEART RATE: 114 BPM | SYSTOLIC BLOOD PRESSURE: 120 MMHG

## 2024-06-25 VITALS
HEIGHT: 64 IN | BODY MASS INDEX: 44.32 KG/M2 | DIASTOLIC BLOOD PRESSURE: 84 MMHG | HEART RATE: 101 BPM | SYSTOLIC BLOOD PRESSURE: 122 MMHG | WEIGHT: 259.6 LBS

## 2024-06-25 DIAGNOSIS — O24.113 PRE-EXISTING TYPE 2 DIABETES MELLITUS WITH HYPERGLYCEMIA DURING PREGNANCY IN THIRD TRIMESTER (HCC): Chronic | ICD-10-CM

## 2024-06-25 DIAGNOSIS — E11.65 PRE-EXISTING TYPE 2 DIABETES MELLITUS WITH HYPERGLYCEMIA DURING PREGNANCY IN THIRD TRIMESTER (HCC): Chronic | ICD-10-CM

## 2024-06-25 DIAGNOSIS — Z3A.34 34 WEEKS GESTATION OF PREGNANCY: ICD-10-CM

## 2024-06-25 DIAGNOSIS — Z3A.34 34 WEEKS GESTATION OF PREGNANCY: Primary | ICD-10-CM

## 2024-06-25 DIAGNOSIS — O36.63X0 EXCESSIVE FETAL GROWTH AFFECTING MANAGEMENT OF PREGNANCY IN THIRD TRIMESTER, SINGLE OR UNSPECIFIED FETUS: ICD-10-CM

## 2024-06-25 DIAGNOSIS — O09.299 H/O SHOULDER DYSTOCIA IN PRIOR PREGNANCY, CURRENTLY PREGNANT: ICD-10-CM

## 2024-06-25 DIAGNOSIS — Z34.83 PRENATAL CARE, SUBSEQUENT PREGNANCY, THIRD TRIMESTER: Primary | ICD-10-CM

## 2024-06-25 DIAGNOSIS — R03.0 ELEVATED BLOOD PRESSURE READING WITHOUT DIAGNOSIS OF HYPERTENSION: ICD-10-CM

## 2024-06-25 PROCEDURE — 59025 FETAL NON-STRESS TEST: CPT | Performed by: OBSTETRICS & GYNECOLOGY

## 2024-06-25 PROCEDURE — PNV: Performed by: STUDENT IN AN ORGANIZED HEALTH CARE EDUCATION/TRAINING PROGRAM

## 2024-06-25 PROCEDURE — 76815 OB US LIMITED FETUS(S): CPT | Performed by: OBSTETRICS & GYNECOLOGY

## 2024-06-25 NOTE — LETTER
June 25, 2024     Lynn Cantrell MD  [address]  [address]  [facility] [address]    Patient: Verito Mata   YOB: 1985   Date of Visit: 6/25/2024       Dear Dr. Cantrell:    Thank you for referring Verito Mata to me for evaluation. Below are my notes for this consultation.    If you have questions, please do not hesitate to call me. I look forward to following your patient along with you.         Sincerely,        Madyson Calderón RN        CC: No Recipients    Boby Davila MD  6/25/2024  8:55 AM  Sign when Signing Visit  Please refer to the [facility] ultrasound report in Ob Procedures for additional information regarding today's visit

## 2024-06-25 NOTE — PROGRESS NOTES
Patient here for prenatal visit.   GA: 34w2d    Denies leaking of fluid, bleeding, cramping  Normal Fetal Movement  28wk labs completed at hospital  GBS negative at 33wks  Tdap given 24  Breast pump ordered 24  Ped referral not needed  Delivery consent signed 24  Patient has a lot of swelling in feet and some in her hands.   Patient unable to void.

## 2024-06-25 NOTE — ASSESSMENT & PLAN NOTE
Reviewed with patient recommendation for delivery time and mode. Patient is interested in delivery closer to 37 wks in the 37-38 wk range given by Dr. Mandel/[facility]. Discussed risk of early term delivery including need for respiratory support. Reviewed high likelihood of NICU admission for hypoglycemia as well.

## 2024-06-25 NOTE — ASSESSMENT & PLAN NOTE
Discussed with the patient excessive fetal growth and estimation that this baby at 37 wks will exceed the growth of her baby who had the shoulder dystocia. We discussed given that a high risk for recurrence likely above the  typical 15% quoted and that her dystocia should it occur could be severe based her multiple risk factors (excessive fetal growth, DM, multiparity, obesity and excessive weight gain in pregnancy). We reviewed a severe dystocia could result in nerve damage both temporary or permanent, brain damage/anoxia or fetal death. We are recommending delivery by  largely for fetal benefit although maternal trauma could also occur with a severe dystocia. Patient is accepting of this recommendation. She is not interested in sterilization or LARC at the time of birth and would consider depo. Will request primary CS at 37+1 7/15.

## 2024-06-25 NOTE — PROGRESS NOTES
Pre-existing type 2 diabetes mellitus with hyperglycemia during pregnancy in third trimester (HCC)  Following with DP. Reports fasting 125ish and PP 150ish since discharge. Much improved since prior to recent admission.  Lab Results   Component Value Date    HGBA1C 7.5 (H) 2024       34 weeks gestation of pregnancy  38 yo  at 34+2 here for routine ob visit. No contractions, leaking or bleeding. Good fetal movement. Return in 2 wks.    Excessive fetal growth affecting management of pregnancy in third trimester  Reviewed with patient recommendation for delivery time and mode. Patient is interested in delivery closer to 37 wks in the 37-38 wk range given by Dr. Mandel/[facility]. Discussed risk of early term delivery including need for respiratory support. Reviewed high likelihood of NICU admission for hypoglycemia as well.     H/O shoulder dystocia in prior pregnancy, currently pregnant  Discussed with the patient excessive fetal growth and estimation that this baby at 37 wks will exceed the growth of her baby who had the shoulder dystocia. We discussed given that a high risk for recurrence likely above the  typical 15% quoted and that her dystocia should it occur could be severe based her multiple risk factors (excessive fetal growth, DM, multiparity, obesity and excessive weight gain in pregnancy). We reviewed a severe dystocia could result in nerve damage both temporary or permanent, brain damage/anoxia or fetal death. We are recommending delivery by  largely for fetal benefit although maternal trauma could also occur with a severe dystocia. Patient is accepting of this recommendation. She is not interested in sterilization or LARC at the time of birth and would consider depo. Will request primary CS at 37+1 7/15.    Elevated blood pressure reading without diagnosis of hypertension  Non-sustained mild range today, asymptomatic.

## 2024-06-25 NOTE — ASSESSMENT & PLAN NOTE
38 yo  at 34+2 here for routine ob visit. No contractions, leaking or bleeding. Good fetal movement. Return in 2 wks.

## 2024-06-25 NOTE — ASSESSMENT & PLAN NOTE
Following with DP. Reports fasting 125ish and PP 150ish since discharge. Much improved since prior to recent admission.  Lab Results   Component Value Date    HGBA1C 7.5 (H) 06/17/2024

## 2024-06-26 ENCOUNTER — TELEPHONE (OUTPATIENT)
Dept: OBGYN CLINIC | Facility: CLINIC | Age: 39
End: 2024-06-26

## 2024-06-26 NOTE — TELEPHONE ENCOUNTER
37 week is what is recommended per MFM and AMY note- IOL request states 8/15 which is a typo. Request for 7/15- this is unavailable 7/16 10 am with ED is open.     Speaking with patient about availability and on call providers and she has asked to call us back-

## 2024-06-26 NOTE — TELEPHONE ENCOUNTER
----- Message from Alondra Marin MD sent at 6/25/2024  3:36 PM EDT -----  Procedure to be scheduled (IOL or CS): CS  VIBHA: Estimated Date of Delivery: 8/4/24  Indication for delivery: poorly controlled diabetes  Requested date (s) of delivery: 8/15/24 1000  If requested date is unavailable, is there a date by which the pt must be delivered? 38+0  Physician preference: none  If IOL, anticipated method: n/a  If CS, with or without tubal: without, declines LARC

## 2024-06-27 NOTE — PATIENT INSTRUCTIONS
Thank you for choosing us for your  care today.  If you have any questions about your ultrasound or care, please do not hesitate to contact us or your primary obstetrician.        Some general instructions for your pregnancy are:    Exercise: Aim for 150 minutes per week of regular exercise.  Walking is great!  Nutrition: Choose healthy sources of calcium, iron, and protein.  Avoid ultraprocessed foods and added sugar.  Learn about Preeclampsia: preeclampsia is a common, potentially serious high blood pressure complication in pregnancy.  A blood pressure of 140mmHg (systolic or top number) or 90mmHg (diastolic or bottom number) should be evaluated by your doctor.  Aspirin is sometimes prescribed in early pregnancy to prevent preeclampsia in women with risk factors - ask your obstetrician if you should be on this medication.  For more resources, visit:  https://www.highriskpregnancyinfo.org/preeclampsia  If you smoke, please try to quit completely but also try to reduce your smoking by as much as possible (as soon as possible).  Do not vape.  Please also avoid cannabis products.  Other warning signs to watch out for in pregnancy or postpartum: chest pain, obstructed breathing or shortness of breath, seizures, thoughts of hurting yourself or your baby, bleeding, a painful or swollen leg, fever, or headache (see [facility] POST-[facility] Warning Signs campaign).  If these happen call 911.  Itching is also not normal in pregnancy and if you experience this, especially over your hands and feet, potentially worse at night, notify your doctors.  Kick Counts in Pregnancy   AMBULATORY CARE:   Kick counts  measure how much your baby is moving in your womb. A kick from your baby can be felt as a twist, turn, swish, roll, or jab. It is common to feel your baby kicking at 26 to 28 weeks of pregnancy. You may feel your baby kick as early as 20 weeks of pregnancy. You may want to start counting at 28 weeks.   Contact your doctor  immediately if:   You feel a change in the number of kicks or movements of your baby.      You feel fewer than 10 kicks within 2 hours.      You have questions or concerns about your baby's movements.     Why measure kick counts:  Your baby's movement may provide information about your baby's health. He or she may move less, or not at all, if there are problems. Your baby may move less if he or she is not getting enough oxygen or nutrition from the placenta. Do not smoke while you are pregnant. Smoking decreases the amount of oxygen that gets to your baby. Talk to your healthcare provider if you need help to quit smoking. Tell your healthcare provider as soon as you feel a change in your baby's movements.  When to measure kick counts:   Measure kick counts at the same time every day.       Measure kick counts when your baby is awake and most active. Your baby may be most active in the evening.     How to measure kick counts:  Check that your baby is awake before you measure kick counts. You can wake up your baby by lightly pushing on your belly, walking, or drinking something cold. Your healthcare provider may tell you different ways to measure kick counts. You may be told to do the following:  Use a chart or clock to keep track of the time you start and finish counting.      Sit in a chair or lie on your left side.      Place your hands on the largest part of your belly.      Count until you reach 10 kicks. Write down how much time it takes to count 10 kicks.      It may take 30 minutes to 2 hours to count 10 kicks. It should not take more than 2 hours to count 10 kicks.     Follow up with your doctor as directed:  Write down your questions so you remember to ask them during your visits.   © Copyright [employer] 2023 Information is for End User's use only and may not be sold, redistributed or otherwise used for commercial purposes.  The above information is an  only. It is not intended as medical  advice for individual conditions or treatments. Talk to your doctor, nurse or pharmacist before following any medical regimen to see if it is safe and effective for you. Nonstress Test for Pregnancy   WHAT YOU NEED TO KNOW:   What do I need to know about a nonstress test?  A nonstress test measures your baby's heart rate and movements. Nonstress means that no stress will be placed on your baby during the test.  How do I prepare for a nonstress test?  Your healthcare provider will talk to you about how to prepare for this test. Your provider may tell you to eat and drink plenty of liquids before your test. If you smoke, you may be asked not to smoke within 2 hours before the test. Your provider will also tell you which medicines to take or not take on the day of your test.  What will happen during a nonstress test?  You may be asked to lie down or recline back for the test on a bed. One or 2 belts with sensors will be placed around your abdomen. Your baby's heart rate will be recorded with a machine. If your baby does not move, your baby may be asleep. Your healthcare provider may make a noise near your abdomen to try to wake your baby. The test usually takes about 20 minutes, but can take longer if your baby needs to be awakened.        What do I need to know about the test results?  Your baby will be expected to move at least 2 times for a certain amount of time. Your baby's heart rate will be expected to go up by a certain number of beats per minute during movement. If your baby does not move as expected, the test may need to be repeated or you may need other tests.  CARE AGREEMENT:   You have the right to help plan your care. Learn about your health condition and how it may be treated. Discuss treatment options with your healthcare providers to decide what care you want to receive. You always have the right to refuse treatment. The above information is an  only. It is not intended as medical advice  for individual conditions or treatments. Talk to your doctor, nurse or pharmacist before following any medical regimen to see if it is safe and effective for you.  © Copyright [employer] 2023 Information is for End User's use only and may not be sold, redistributed or otherwise used for commercial purposes.

## 2024-06-27 NOTE — TELEPHONE ENCOUNTER
Spoke with patient she is approving of  at 10am with ED.     Patient notified of  date,time,and location. Advised patient L&D unit will call the night before surgery to go over arrival time and instructions. In the interim please report any vaginal bleeding,leakage of fluid,decreased fetal movement or contractions.Reviewed fetal kick counts. Advised to keep all upcoming prenatal visits.

## 2024-06-27 NOTE — TELEPHONE ENCOUNTER
Patient called in regards to call she received about iol.          I saw [facility] is not in.        Please advise .

## 2024-06-28 ENCOUNTER — ROUTINE PRENATAL (OUTPATIENT)
Dept: PERINATAL CARE | Facility: OTHER | Age: 39
End: 2024-06-28
Payer: COMMERCIAL

## 2024-06-28 VITALS
HEART RATE: 95 BPM | SYSTOLIC BLOOD PRESSURE: 100 MMHG | DIASTOLIC BLOOD PRESSURE: 60 MMHG | HEIGHT: 64 IN | BODY MASS INDEX: 43.87 KG/M2 | WEIGHT: 257 LBS

## 2024-06-28 DIAGNOSIS — Z3A.34 34 WEEKS GESTATION OF PREGNANCY: ICD-10-CM

## 2024-06-28 DIAGNOSIS — O24.113 PRE-EXISTING TYPE 2 DIABETES MELLITUS WITH HYPERGLYCEMIA DURING PREGNANCY IN THIRD TRIMESTER (HCC): Primary | Chronic | ICD-10-CM

## 2024-06-28 DIAGNOSIS — E11.65 PRE-EXISTING TYPE 2 DIABETES MELLITUS WITH HYPERGLYCEMIA DURING PREGNANCY IN THIRD TRIMESTER (HCC): Primary | Chronic | ICD-10-CM

## 2024-06-28 PROCEDURE — 59025 FETAL NON-STRESS TEST: CPT | Performed by: OBSTETRICS & GYNECOLOGY

## 2024-06-28 NOTE — LETTER
June 28, 2024     Alondra Marin MD  [address]  [address] [place], [place] [address]    Patient: Verito Mata   YOB: 1985   Date of Visit: 6/28/2024       Dear Dr. Jeni Marin:    Thank you for referring Verito Mata to me for evaluation. Below are my notes for this consultation.    If you have questions, please do not hesitate to call me. I look forward to following your patient along with you.         Sincerely,        Ana Cristina Quinn MD        CC: No Recipients    Ana Cristina Quinn MD  6/28/2024  9:24 AM  Sign when Signing Visit  NST is reactive.  Ana Cristina Quinn M.D.

## 2024-06-30 ENCOUNTER — DOCUMENTATION (OUTPATIENT)
Facility: HOSPITAL | Age: 39
End: 2024-06-30

## 2024-06-30 NOTE — PROGRESS NOTES
Verito cardona for MFM consultation at [facility] on June 17, 2024.  Please see the consultation note for additional information regarding the encounter.

## 2024-07-02 ENCOUNTER — ULTRASOUND (OUTPATIENT)
Dept: PERINATAL CARE | Facility: OTHER | Age: 39
End: 2024-07-02
Payer: COMMERCIAL

## 2024-07-02 VITALS
HEIGHT: 64 IN | WEIGHT: 259.2 LBS | BODY MASS INDEX: 44.25 KG/M2 | HEART RATE: 96 BPM | DIASTOLIC BLOOD PRESSURE: 70 MMHG | SYSTOLIC BLOOD PRESSURE: 90 MMHG

## 2024-07-02 DIAGNOSIS — E11.65 PRE-EXISTING TYPE 2 DIABETES MELLITUS WITH HYPERGLYCEMIA DURING PREGNANCY IN THIRD TRIMESTER (HCC): Primary | Chronic | ICD-10-CM

## 2024-07-02 DIAGNOSIS — O24.113 PRE-EXISTING TYPE 2 DIABETES MELLITUS WITH HYPERGLYCEMIA DURING PREGNANCY IN THIRD TRIMESTER (HCC): Primary | Chronic | ICD-10-CM

## 2024-07-02 DIAGNOSIS — Z3A.35 35 WEEKS GESTATION OF PREGNANCY: ICD-10-CM

## 2024-07-02 PROCEDURE — 59025 FETAL NON-STRESS TEST: CPT | Performed by: OBSTETRICS & GYNECOLOGY

## 2024-07-02 PROCEDURE — 76815 OB US LIMITED FETUS(S): CPT | Performed by: OBSTETRICS & GYNECOLOGY

## 2024-07-02 NOTE — PROGRESS NOTES
This patient received  care under my supervision on 24 at 35w2d gestational age at [facility].  NST is reactive.  -Sonal Mcbride MD

## 2024-07-04 DIAGNOSIS — O99.210 MATERNAL OBESITY, ANTEPARTUM: ICD-10-CM

## 2024-07-04 DIAGNOSIS — Z3A.24 24 WEEKS GESTATION OF PREGNANCY: ICD-10-CM

## 2024-07-04 DIAGNOSIS — O24.912 DIABETES MELLITUS AFFECTING PREGNANCY IN SECOND TRIMESTER: ICD-10-CM

## 2024-07-05 ENCOUNTER — TREATMENT (OUTPATIENT)
Facility: HOSPITAL | Age: 39
End: 2024-07-05

## 2024-07-05 ENCOUNTER — ROUTINE PRENATAL (OUTPATIENT)
Dept: PERINATAL CARE | Facility: OTHER | Age: 39
End: 2024-07-05
Payer: COMMERCIAL

## 2024-07-05 VITALS
SYSTOLIC BLOOD PRESSURE: 124 MMHG | HEIGHT: 64 IN | WEIGHT: 261 LBS | BODY MASS INDEX: 44.56 KG/M2 | DIASTOLIC BLOOD PRESSURE: 80 MMHG | HEART RATE: 103 BPM

## 2024-07-05 DIAGNOSIS — O24.113 PRE-EXISTING TYPE 2 DIABETES MELLITUS WITH HYPERGLYCEMIA DURING PREGNANCY IN THIRD TRIMESTER (HCC): Chronic | ICD-10-CM

## 2024-07-05 DIAGNOSIS — Z3A.35 35 WEEKS GESTATION OF PREGNANCY: Primary | ICD-10-CM

## 2024-07-05 DIAGNOSIS — E11.65 PRE-EXISTING TYPE 2 DIABETES MELLITUS WITH HYPERGLYCEMIA DURING PREGNANCY IN THIRD TRIMESTER (HCC): Chronic | ICD-10-CM

## 2024-07-05 DIAGNOSIS — O24.113 PREGNANCY WITH TYPE 2 DIABETES MELLITUS IN THIRD TRIMESTER: Primary | ICD-10-CM

## 2024-07-05 DIAGNOSIS — Z3A.35 35 WEEKS GESTATION OF PREGNANCY: ICD-10-CM

## 2024-07-05 PROCEDURE — 59025 FETAL NON-STRESS TEST: CPT | Performed by: OBSTETRICS & GYNECOLOGY

## 2024-07-05 RX ORDER — ACYCLOVIR 400 MG/1
TABLET ORAL
Qty: 3 EACH | Refills: 2 | Status: SHIPPED | OUTPATIENT
Start: 2024-07-05

## 2024-07-05 NOTE — PATIENT INSTRUCTIONS
Thank you for choosing us for your  care today.  If you have any questions about your ultrasound or care, please do not hesitate to contact us or your primary obstetrician.        Some general instructions for your pregnancy are:    Exercise: Aim for 150 minutes per week of regular exercise.  Walking is great!  Nutrition: Choose healthy sources of calcium, iron, and protein.  Avoid ultraprocessed foods and added sugar.  Learn about Preeclampsia: preeclampsia is a common, potentially serious high blood pressure complication in pregnancy.  A blood pressure of 140mmHg (systolic or top number) or 90mmHg (diastolic or bottom number) should be evaluated by your doctor.  Aspirin is sometimes prescribed in early pregnancy to prevent preeclampsia in women with risk factors - ask your obstetrician if you should be on this medication.  For more resources, visit:  https://www.highriskpregnancyinfo.org/preeclampsia  If you smoke, please try to quit completely but also try to reduce your smoking by as much as possible (as soon as possible).  Do not vape.  Please also avoid cannabis products.  Other warning signs to watch out for in pregnancy or postpartum: chest pain, obstructed breathing or shortness of breath, seizures, thoughts of hurting yourself or your baby, bleeding, a painful or swollen leg, fever, or headache ([facility]).  If these happen call 911.  Itching is also not normal in pregnancy and if you experience this, especially over your hands and feet, potentially worse at night, notify your doctors.     Kick Counts in Pregnancy   AMBULATORY CARE:   Kick counts  measure how much your baby is moving in your womb. A kick from your baby can be felt as a twist, turn, swish, roll, or jab. It is common to feel your baby kicking at 26 to 28 weeks of pregnancy. You may feel your baby kick as early as 20 weeks of pregnancy. You may want to start counting at 28 weeks.   Contact your  doctor immediately if:   You feel a change in the number of kicks or movements of your baby.      You feel fewer than 10 kicks within 2 hours.      You have questions or concerns about your baby's movements.     Why measure kick counts:  Your baby's movement may provide information about your baby's health. He or she may move less, or not at all, if there are problems. Your baby may move less if he or she is not getting enough oxygen or nutrition from the placenta. Do not smoke while you are pregnant. Smoking decreases the amount of oxygen that gets to your baby. Talk to your healthcare provider if you need help to quit smoking. Tell your healthcare provider as soon as you feel a change in your baby's movements.  When to measure kick counts:   Measure kick counts at the same time every day.       Measure kick counts when your baby is awake and most active. Your baby may be most active in the evening.     How to measure kick counts:  Check that your baby is awake before you measure kick counts. You can wake up your baby by lightly pushing on your belly, walking, or drinking something cold. Your healthcare provider may tell you different ways to measure kick counts. You may be told to do the following:  Use a chart or clock to keep track of the time you start and finish counting.      Sit in a chair or lie on your left side.      Place your hands on the largest part of your belly.      Count until you reach 10 kicks. Write down how much time it takes to count 10 kicks.      It may take 30 minutes to 2 hours to count 10 kicks. It should not take more than 2 hours to count 10 kicks.     Follow up with your doctor as directed:  Write down your questions so you remember to ask them during your visits.   © Copyright [employer] 2023 Information is for End User's use only and may not be sold, redistributed or otherwise used for commercial purposes.  The above information is an  only. It is not intended as  medical advice for individual conditions or treatments. Talk to your doctor, nurse or pharmacist before following any medical regimen to see if it is safe and effective for you. Nonstress Test for Pregnancy   WHAT YOU NEED TO KNOW:   What do I need to know about a nonstress test?  A nonstress test measures your baby's heart rate and movements. Nonstress means that no stress will be placed on your baby during the test.  How do I prepare for a nonstress test?  Your healthcare provider will talk to you about how to prepare for this test. Your provider may tell you to eat and drink plenty of liquids before your test. If you smoke, you may be asked not to smoke within 2 hours before the test. Your provider will also tell you which medicines to take or not take on the day of your test.  What will happen during a nonstress test?  You may be asked to lie down or recline back for the test on a bed. One or 2 belts with sensors will be placed around your abdomen. Your baby's heart rate will be recorded with a machine. If your baby does not move, your baby may be asleep. Your healthcare provider may make a noise near your abdomen to try to wake your baby. The test usually takes about 20 minutes, but can take longer if your baby needs to be awakened.        What do I need to know about the test results?  Your baby will be expected to move at least 2 times for a certain amount of time. Your baby's heart rate will be expected to go up by a certain number of beats per minute during movement. If your baby does not move as expected, the test may need to be repeated or you may need other tests.  CARE AGREEMENT:   You have the right to help plan your care. Learn about your health condition and how it may be treated. Discuss treatment options with your healthcare providers to decide what care you want to receive. You always have the right to refuse treatment. The above information is an  only. It is not intended as  medical advice for individual conditions or treatments. Talk to your doctor, nurse or pharmacist before following any medical regimen to see if it is safe and effective for you.  © Copyright [employer] 2023 Information is for End User's use only and may not be sold, redistributed or otherwise used for commercial purposes.

## 2024-07-05 NOTE — PROGRESS NOTES
"  Date: 24  Verito Mata  1985  Estimated Date of Delivery: 24 ( scheduled 24)  35w5d  OB/GYN:ANDREA    Diagnosis:  Pre-existing Type 2 Diabetes in pregnancy    Blood Sugar Logs Submitted via:  DexCom G7 report . Report indicates her average BG is 150 & 33% of time is in range. Noted pattern of significant highs between 10:00PM-6:00AM; Patient has not been reporting fingersticks        Assessment and Plan: Reviewed Dexcom Report/Plan approved by Dr. Davila      Due to elevated Fasting Blood Sugars (per Dexcom report),  Increase Lantus from 132 units to 144 units once daily at 11pm; split into 4 equal injections of 36 units each.    Continue Humalog 32 units before each meal; Inject 15 minutes before meal, Hold if not eating    2000 calorie (CHO:45-15-60-15-60-30) (PRO: 2/3-1-3/4-1-3/-2) meal plan consisting of 3 meals and 3 snacks daily including protein at each    Avoid fasting for > 10 hours overnight  Continue SMBG 4 x per day (Fasting, 2 hour after start of each meal) with a Contour Next EZ glucose meter  If okay by physician, recommend up to 30 minutes of physical activity daily     Lab Work:   Lab Results   Component Value Date    HGBA1C 7.5 (H) 2024      HbA1c was reordered on 24--encouraged patient complete soon.     Ultrasound:           SAKINA: Normal (24)  Next Ultrasound 24     Diabetes Self Management Support Plan outside of ongoing care: Spouse/Family   Patient Stated Goal: \"I will check my blood sugar 4 times each day, as directed by diabetes and pregnancy team\"   Goal Assessment: Not on track    Date to report next: Tuesday, 24    Laurita Quinonez, MPH, RD, [facility]  Diabetes Educator  [facility] Maternal Fetal Medicine  Diabetes in Pregnancy Program    "

## 2024-07-08 ENCOUNTER — TELEPHONE (OUTPATIENT)
Age: 39
End: 2024-07-08

## 2024-07-08 NOTE — TELEPHONE ENCOUNTER
PA for Dexcom G7 Sensor    Submitted via    [x]CMM-KEY [id number]  []Surescripts-Case ID #   []Faxed to plan   []Other website   []Phone call Case ID #     Office notes sent, clinical questions answered. Awaiting determination    Turnaround time for your insurance to make a decision on your Prior Authorization can take 7-21 business days.

## 2024-07-09 ENCOUNTER — ULTRASOUND (OUTPATIENT)
Dept: PERINATAL CARE | Facility: OTHER | Age: 39
End: 2024-07-09
Payer: COMMERCIAL

## 2024-07-09 VITALS
WEIGHT: 265.8 LBS | DIASTOLIC BLOOD PRESSURE: 80 MMHG | HEIGHT: 64 IN | HEART RATE: 80 BPM | BODY MASS INDEX: 45.38 KG/M2 | SYSTOLIC BLOOD PRESSURE: 120 MMHG

## 2024-07-09 DIAGNOSIS — O36.63X0 EXCESSIVE FETAL GROWTH AFFECTING MANAGEMENT OF PREGNANCY IN THIRD TRIMESTER, SINGLE OR UNSPECIFIED FETUS: ICD-10-CM

## 2024-07-09 DIAGNOSIS — E11.65 PRE-EXISTING TYPE 2 DIABETES MELLITUS WITH HYPERGLYCEMIA DURING PREGNANCY IN THIRD TRIMESTER (HCC): Primary | Chronic | ICD-10-CM

## 2024-07-09 DIAGNOSIS — O09.299 H/O SHOULDER DYSTOCIA IN PRIOR PREGNANCY, CURRENTLY PREGNANT: ICD-10-CM

## 2024-07-09 DIAGNOSIS — O24.113 PRE-EXISTING TYPE 2 DIABETES MELLITUS WITH HYPERGLYCEMIA DURING PREGNANCY IN THIRD TRIMESTER (HCC): Primary | Chronic | ICD-10-CM

## 2024-07-09 DIAGNOSIS — O09.523 MULTIGRAVIDA OF ADVANCED MATERNAL AGE IN THIRD TRIMESTER: ICD-10-CM

## 2024-07-09 DIAGNOSIS — O99.210 OTHER OBESITY DUE TO EXCESS CALORIES AFFECTING PREGNANCY, ANTEPARTUM: ICD-10-CM

## 2024-07-09 DIAGNOSIS — O26.03 EXCESSIVE WEIGHT GAIN IN PREGNANCY IN THIRD TRIMESTER: ICD-10-CM

## 2024-07-09 DIAGNOSIS — Z3A.36 36 WEEKS GESTATION OF PREGNANCY: ICD-10-CM

## 2024-07-09 DIAGNOSIS — E66.09 OTHER OBESITY DUE TO EXCESS CALORIES AFFECTING PREGNANCY, ANTEPARTUM: ICD-10-CM

## 2024-07-09 PROCEDURE — 76816 OB US FOLLOW-UP PER FETUS: CPT | Performed by: OBSTETRICS & GYNECOLOGY

## 2024-07-09 PROCEDURE — 59025 FETAL NON-STRESS TEST: CPT | Performed by: NURSE PRACTITIONER

## 2024-07-09 PROCEDURE — 99213 OFFICE O/P EST LOW 20 MIN: CPT | Performed by: NURSE PRACTITIONER

## 2024-07-09 NOTE — LETTER
July 9, 2024     ELIER Burns  [address] [facility]  [address] [place], [place] [address]    Patient: Verito Mata   YOB: 1985   Date of Visit: 7/9/2024       Dear Dr. Levine:    Thank you for referring Verito Mata to me for evaluation. Below are my notes for this consultation.    If you have questions, please do not hesitate to call me. I look forward to following your patient along with you.         Sincerely,        Ana Cristina Quinn MD        CC: No Recipients    Ana Cristina Quinn MD  7/9/2024 11:10 AM  Sign when Signing Visit  Verito Mata has no complaints today.  She reports regular fetal movements and does not report any problems.  She is here today at 36w2d for evaluation of fetal weight and amniotic fluid and NST.         Problem list:  1.  Pre-existing type 2 diabetes with continuous glucose monitoring  2.  Advanced maternal age  3.  Pregravid BMI of 36.89  4.  History of shoulder dystocia  5.  Fetal macrosomia     Ultrasound findings:  A viable [place] intrauterine pregnancy is seen. Estimated fetal weight is at the 98th percentile with abdomen > 99th percentile consistent with ongoing fetal macrosomia.  Amniotic fluid is within normal limits for gestational age. No fetal anomalies are visualized on limited survey. Placenta is within normal limits. Weight gain in pregnancy is 50lbs.       Pregnancy ultrasound has limitations and is unable to detect all forms of fetal congenital abnormalities.  The inaccuracy in the EFW can be off by 1 lb either way in the third trimester.     Specific counseling was provided on the following problems:  1.  We discussed the status of her gestational diabetes.  She is currently on Lantus 144 units daily as well as Humalog 32 units with meals.  She maintains close contact with our diabetic team but her blood sugars are not well controlled despite her prior admission for insulin titration with further outpatient titration requiring high  doses of insulin   2.  We discussed her ongoing fetal macrosomia.  Given her poorly controlled blood sugars based on her dexcom reporting only 33% in range and 31% high and 36 % very high on 24 and her macrosomia and history of shoulder dystocia she is schedule for elective  section on 2024 at 37 weeks 2 days.  She is aware of likely NICU stay for .    Follow up recommended:   1.  No further growth scans are indicated  2.  Continue twice weekly NST and weekly SAKINA for the indication of pre-existing type 2 diabetes on insulin.     Split-shared decision-making between ELIER Dunne and myself was utilized, with the majority of the time spent by LEEROY Dunne  Medical decision-making for this encounter was low (diagnosis low, data limited and risk low).    Procedures that were completed today were charged separately.     I reviewed the ultrasound pictures and recommended the medical decision making transcribed in the care of this patient.      Ana Cristina Quinn MD        Spouse

## 2024-07-09 NOTE — TELEPHONE ENCOUNTER
PA for Dexcom G7 Sensor  Approved     Date(s) approved 07/08/2024-07/08/2025    Case #    Patient advised by          [x] ServiceMeshhart Message  [] Phone call   []LMOM  []L/M to call office as no active Communication consent on file  []Unable to leave detailed message as VM not approved on Communication consent       Pharmacy advised by    [x]Fax  []Phone call    Approval letter scanned into Media Yes

## 2024-07-09 NOTE — PROGRESS NOTES
Verito Mata has no complaints today.  She reports regular fetal movements and does not report any problems.  She is here today at 36w2d for evaluation of fetal weight and amniotic fluid and NST.         Problem list:  1.  Pre-existing type 2 diabetes with continuous glucose monitoring  2.  Advanced maternal age  3.  Pregravid BMI of 36.89  4.  History of shoulder dystocia  5.  Fetal macrosomia     Ultrasound findings:  A viable [place] intrauterine pregnancy is seen. Estimated fetal weight is at the 98th percentile with abdomen > 99th percentile consistent with ongoing fetal macrosomia.  Amniotic fluid is within normal limits for gestational age. No fetal anomalies are visualized on limited survey. Placenta is within normal limits. Weight gain in pregnancy is 50lbs.       Pregnancy ultrasound has limitations and is unable to detect all forms of fetal congenital abnormalities.  The inaccuracy in the EFW can be off by 1 lb either way in the third trimester.     Specific counseling was provided on the following problems:  1.  We discussed the status of her gestational diabetes.  She is currently on Lantus 144 units daily as well as Humalog 32 units with meals.  She maintains close contact with our diabetic team but her blood sugars are not well controlled despite her prior admission for insulin titration with further outpatient titration requiring high doses of insulin   2.  We discussed her ongoing fetal macrosomia.  Given her poorly controlled blood sugars based on her dexcom reporting only 33% in range and 31% high and 36 % very high on 24 and her macrosomia and history of shoulder dystocia she is schedule for elective  section on 2024 at 37 weeks 2 days.  She is aware of likely NICU stay for .    Follow up recommended:   1.  No further growth scans are indicated  2.  Continue twice weekly NST and weekly SAKINA for the indication of pre-existing type 2 diabetes on insulin.      Split-shared decision-making between ELIER Dunne and myself was utilized, with the majority of the time spent by LEEROY Dunne  Medical decision-making for this encounter was low (diagnosis low, data limited and risk low).    Procedures that were completed today were charged separately.     I reviewed the ultrasound pictures and recommended the medical decision making transcribed in the care of this patient.      Ana Cristina Quinn MD

## 2024-07-10 PROBLEM — Z3A.36 36 WEEKS GESTATION OF PREGNANCY: Status: ACTIVE | Noted: 2023-12-26

## 2024-07-11 ENCOUNTER — ROUTINE PRENATAL (OUTPATIENT)
Dept: OBGYN CLINIC | Facility: MEDICAL CENTER | Age: 39
End: 2024-07-11
Payer: COMMERCIAL

## 2024-07-11 VITALS
HEART RATE: 113 BPM | HEIGHT: 64 IN | DIASTOLIC BLOOD PRESSURE: 84 MMHG | BODY MASS INDEX: 45.75 KG/M2 | WEIGHT: 268 LBS | SYSTOLIC BLOOD PRESSURE: 132 MMHG

## 2024-07-11 DIAGNOSIS — O36.63X0 EXCESSIVE FETAL GROWTH AFFECTING MANAGEMENT OF PREGNANCY IN THIRD TRIMESTER, SINGLE OR UNSPECIFIED FETUS: ICD-10-CM

## 2024-07-11 DIAGNOSIS — O99.810 HYPERGLYCEMIA IN PREGNANCY: ICD-10-CM

## 2024-07-11 DIAGNOSIS — O99.210 MATERNAL OBESITY, ANTEPARTUM: Primary | ICD-10-CM

## 2024-07-11 DIAGNOSIS — E11.65 PRE-EXISTING TYPE 2 DIABETES MELLITUS WITH HYPERGLYCEMIA DURING PREGNANCY IN THIRD TRIMESTER (HCC): Chronic | ICD-10-CM

## 2024-07-11 DIAGNOSIS — O24.113 PRE-EXISTING TYPE 2 DIABETES MELLITUS WITH HYPERGLYCEMIA DURING PREGNANCY IN THIRD TRIMESTER (HCC): Chronic | ICD-10-CM

## 2024-07-11 DIAGNOSIS — Z34.83 PRENATAL CARE, SUBSEQUENT PREGNANCY, THIRD TRIMESTER: ICD-10-CM

## 2024-07-11 DIAGNOSIS — Z3A.36 36 WEEKS GESTATION OF PREGNANCY: ICD-10-CM

## 2024-07-11 DIAGNOSIS — O26.03 EXCESSIVE WEIGHT GAIN IN PREGNANCY IN THIRD TRIMESTER: ICD-10-CM

## 2024-07-11 LAB
SL AMB  POCT GLUCOSE, UA: NORMAL
SL AMB POCT URINE PROTEIN: NORMAL

## 2024-07-11 PROCEDURE — 81002 URINALYSIS NONAUTO W/O SCOPE: CPT | Performed by: CLINICAL NURSE SPECIALIST

## 2024-07-11 PROCEDURE — 87150 DNA/RNA AMPLIFIED PROBE: CPT | Performed by: CLINICAL NURSE SPECIALIST

## 2024-07-11 PROCEDURE — PNV: Performed by: CLINICAL NURSE SPECIALIST

## 2024-07-11 NOTE — ASSESSMENT & PLAN NOTE
36w4d  No S/S Labor, fetus remains active.  GBS culture was collected at this visit.   Deferred cervical exam due to mode of delivery planned- C/S  She does have LE edema-+2 pedal, tibial, negative proteinurin and no c/o HA, vision changes or RUQ pain.  Reviewed the signs and symptoms of labor  and other precautions- vaginal bleeding, leaking fluid and decreased fetal movement.

## 2024-07-11 NOTE — ASSESSMENT & PLAN NOTE
Poorly controlled DM  Using Dexcom and following closely w/ MFM  Reviewed DM dx very early in pregnancy and likely Diabetic prior to pregnancy but undx.   Will need continued monitoring PP and eventually referred to endocrine.   Stress importance of following Fetal activity and calling with any decreases

## 2024-07-11 NOTE — PROGRESS NOTES
Prenatal Visit  Subjective:   Verito is a 39 y.o.  36w4d here for Routine Prenatal Visit (/VIBHA 24/Blood type O+/Labs UTD/Del consent signed/Breast pump ordered/Peds not needed/Tdap 24/Urine -/-/GBS neg //Denies LOF, VB, or CTX./Pt has +FM/Questions or conerns-/)    Denies unusual vaginal discharge, LOF, VB, or ctx. Reports Fetus is active.  Due for GBS    Denies HA, vision changes or RUQ pain    Objective:  Vitals:    24 1156   BP: 132/84   Pulse: (!) 113       Pregravid Weight/BMI: 97.5 kg (215 lb) (BMI 36.89)  Current Weight: 122 kg (268 lb)   Total Weight Gain: 24 kg (53 lb)     OBGyn Exam  Fetal Heart Rate: 140 ,      Assessment & Plan:      1. Maternal obesity, antepartum  Overview:  -Pre-pregnancy BMI 36.89. Weight 215 lbs.  -Current BMI 41.16. Current weight 239 lbs.  -TWG 24 lbs.  -Recommended weight gain 11 to 20 lbs.   Assessment & Plan:  Excess wt gain   24 kg (53 lb)  Following closely with MFM     2. Excessive fetal growth affecting management of pregnancy in third trimester, single or unspecified fetus  Assessment & Plan:  Scheduled for primary LTCS next week due to macrossomia   3. 36 weeks gestation of pregnancy  Assessment & Plan:  36w4d  No S/S Labor, fetus remains active.  GBS culture was collected at this visit.   Deferred cervical exam due to mode of delivery planned- C/S  She does have LE edema-+2 pedal, tibial, negative proteinurin and no c/o HA, vision changes or RUQ pain.  Reviewed the signs and symptoms of labor  and other precautions- vaginal bleeding, leaking fluid and decreased fetal movement.    4. Pre-existing type 2 diabetes mellitus with hyperglycemia during pregnancy in third trimester ([facility])  Overview:  Glucola 285   Baseline hemoglobin A1c 7.0 on 2024 hemoglobin A1c 7.5  Plan to early delivery at 37 weeks and 2 days for hyperglycemia  On twice weekly nonstress test and weekly fluid scans  Assessment & Plan:  Poorly controlled DM  Using Dexcom  and following closely w/ MFM  Reviewed DM dx very early in pregnancy and likely Diabetic prior to pregnancy but undx.   Will need continued monitoring PP and eventually referred to endocrine.   Stress importance of following Fetal activity and calling with any decreases  5. Prenatal care, subsequent pregnancy, third trimester  -     POCT urine dip  -     Strep B DNA probe, amplification  6. Hyperglycemia in pregnancy  7. Excessive weight gain in pregnancy in third trimester  Overview:  50 lbs by 36 weeks.         ELIER Mart  7/11/2024

## 2024-07-12 ENCOUNTER — ROUTINE PRENATAL (OUTPATIENT)
Dept: PERINATAL CARE | Facility: OTHER | Age: 39
End: 2024-07-12
Payer: COMMERCIAL

## 2024-07-12 ENCOUNTER — TREATMENT (OUTPATIENT)
Facility: HOSPITAL | Age: 39
End: 2024-07-12

## 2024-07-12 VITALS
WEIGHT: 268 LBS | HEIGHT: 64 IN | BODY MASS INDEX: 45.75 KG/M2 | DIASTOLIC BLOOD PRESSURE: 72 MMHG | SYSTOLIC BLOOD PRESSURE: 116 MMHG | HEART RATE: 103 BPM

## 2024-07-12 DIAGNOSIS — O24.113 PRE-EXISTING TYPE 2 DIABETES MELLITUS WITH HYPERGLYCEMIA DURING PREGNANCY IN THIRD TRIMESTER (HCC): Primary | Chronic | ICD-10-CM

## 2024-07-12 DIAGNOSIS — Z3A.36 36 WEEKS GESTATION OF PREGNANCY: ICD-10-CM

## 2024-07-12 DIAGNOSIS — O24.113 PREGNANCY WITH TYPE 2 DIABETES MELLITUS IN THIRD TRIMESTER: Primary | ICD-10-CM

## 2024-07-12 DIAGNOSIS — O99.210 OBESITY AFFECTING PREGNANCY, ANTEPARTUM, UNSPECIFIED OBESITY TYPE: ICD-10-CM

## 2024-07-12 DIAGNOSIS — E11.65 PRE-EXISTING TYPE 2 DIABETES MELLITUS WITH HYPERGLYCEMIA DURING PREGNANCY IN THIRD TRIMESTER (HCC): Primary | Chronic | ICD-10-CM

## 2024-07-12 PROCEDURE — 59025 FETAL NON-STRESS TEST: CPT | Performed by: STUDENT IN AN ORGANIZED HEALTH CARE EDUCATION/TRAINING PROGRAM

## 2024-07-12 NOTE — PROGRESS NOTES
Repeat Non-Stress Testing:    Patient verbalizes +FM. Pt denies ALL:               Leaking of fluid   Contractions   Vaginal bleeding   Decreased fetal movement    Patient is performing daily kick counts. Patient has no questions or concerns.   NST strip reviewed by Dr. Dr. Garcia.

## 2024-07-12 NOTE — PATIENT INSTRUCTIONS
Thank you for choosing us for your  care today.  If you have any questions about your ultrasound or care, please do not hesitate to contact us or your primary obstetrician.        Some general instructions for your pregnancy are:    Exercise: Aim for 150 minutes per week of regular exercise.  Walking is great!  Nutrition: Choose healthy sources of calcium, iron, and protein.  Avoid ultraprocessed foods and added sugar.  Learn about Preeclampsia: preeclampsia is a common, potentially serious high blood pressure complication in pregnancy.  A blood pressure of 140mmHg (systolic or top number) or 90mmHg (diastolic or bottom number) should be evaluated by your doctor.  Aspirin is sometimes prescribed in early pregnancy to prevent preeclampsia in women with risk factors - ask your obstetrician if you should be on this medication.  For more resources, visit:  https://www.highriskpregnancyinfo.org/preeclampsia  If you smoke, please try to quit completely but also try to reduce your smoking by as much as possible (as soon as possible).  Do not vape.  Please also avoid cannabis products.  Other warning signs to watch out for in pregnancy or postpartum: chest pain, obstructed breathing or shortness of breath, seizures, thoughts of hurting yourself or your baby, bleeding, a painful or swollen leg, fever, or headache (see [facility] POST-BIRTH Warning Signs campaign).  If these happen call 911.  Itching is also not normal in pregnancy and if you experience this, especially over your hands and feet, potentially worse at night, notify your doctors.     Nonstress Test for Pregnancy   WHAT YOU NEED TO KNOW:   What do I need to know about a nonstress test?  A nonstress test measures your baby's heart rate and movements. Nonstress means that no stress will be placed on your baby during the test.  How do I prepare for a nonstress test?  Your healthcare provider will talk to you about how to prepare for this test. Your provider may  tell you to eat and drink plenty of liquids before your test. If you smoke, you may be asked not to smoke within 2 hours before the test. Your provider will also tell you which medicines to take or not take on the day of your test.  What will happen during a nonstress test?  You may be asked to lie down or recline back for the test on a bed. One or 2 belts with sensors will be placed around your abdomen. Your baby's heart rate will be recorded with a machine. If your baby does not move, your baby may be asleep. Your healthcare provider may make a noise near your abdomen to try to wake your baby. The test usually takes about 20 minutes, but can take longer if your baby needs to be awakened.        What do I need to know about the test results?  Your baby will be expected to move at least 2 times for a certain amount of time. Your baby's heart rate will be expected to go up by a certain number of beats per minute during movement. If your baby does not move as expected, the test may need to be repeated or you may need other tests.  CARE AGREEMENT:   You have the right to help plan your care. Learn about your health condition and how it may be treated. Discuss treatment options with your healthcare providers to decide what care you want to receive. You always have the right to refuse treatment. The above information is an  only. It is not intended as medical advice for individual conditions or treatments. Talk to your doctor, nurse or pharmacist before following any medical regimen to see if it is safe and effective for you.  © Copyright [employer] 2023 Information is for End User's use only and may not be sold, redistributed or otherwise used for commercial purposes. Kick Counts in Pregnancy   AMBULATORY CARE:   Kick counts  measure how much your baby is moving in your womb. A kick from your baby can be felt as a twist, turn, swish, roll, or jab. It is common to feel your baby kicking at 26 to 28 weeks of  pregnancy. You may feel your baby kick as early as 20 weeks of pregnancy. You may want to start counting at 28 weeks.   Contact your doctor immediately if:   You feel a change in the number of kicks or movements of your baby.      You feel fewer than 10 kicks within 2 hours.      You have questions or concerns about your baby's movements.     Why measure kick counts:  Your baby's movement may provide information about your baby's health. He or she may move less, or not at all, if there are problems. Your baby may move less if he or she is not getting enough oxygen or nutrition from the placenta. Do not smoke while you are pregnant. Smoking decreases the amount of oxygen that gets to your baby. Talk to your healthcare provider if you need help to quit smoking. Tell your healthcare provider as soon as you feel a change in your baby's movements.  When to measure kick counts:   Measure kick counts at the same time every day.       Measure kick counts when your baby is awake and most active. Your baby may be most active in the evening.     How to measure kick counts:  Check that your baby is awake before you measure kick counts. You can wake up your baby by lightly pushing on your belly, walking, or drinking something cold. Your healthcare provider may tell you different ways to measure kick counts. You may be told to do the following:  Use a chart or clock to keep track of the time you start and finish counting.      Sit in a chair or lie on your left side.      Place your hands on the largest part of your belly.      Count until you reach 10 kicks. Write down how much time it takes to count 10 kicks.      It may take 30 minutes to 2 hours to count 10 kicks. It should not take more than 2 hours to count 10 kicks.     Follow up with your doctor as directed:  Write down your questions so you remember to ask them during your visits.   © Copyright [employer] 2023 Information is for End User's use only and may not be sold,  redistributed or otherwise used for commercial purposes.  The above information is an  only. It is not intended as medical advice for individual conditions or treatments. Talk to your doctor, nurse or pharmacist before following any medical regimen to see if it is safe and effective for you.    3

## 2024-07-12 NOTE — PROGRESS NOTES
"  Date: 24  Verito Mata  1985  Estimated Date of Delivery: 24 ( scheduled 24)  36w5d  OB/GYN:ANDREA    Diagnosis:  Pre-existing Type 2 Diabetes in pregnancy    Blood Sugar Logs Submitted via:  DexCom G7 report . Report indicates her  average BG is 135 & 50% of time is in range (Previously 150, 33% time in range). Noted missing Dexcom data between 24-7/10/24 (Noted prior authorization approved 24 for Dexcom); Patient has not been reporting fingersticks      Assessment and Plan: Reviewed Dexcom Report/Plan approved by Dr. Chase    Due to improved Fasting Blood Sugars (per Dexcom report),  Continue Lantus 144 units daily at 11pm; split into 4 equal injections of 36 units each.  In preparation for  scheduled 24, Decrease Lantus injection by 50% for dose on 7/15/24 - Lantus 72 units at 11pm; split into 2 equal injections of 36 units.     Continue Humalog 32 units before each meal; Inject 15 minutes before meal, Hold if not eating    2000 calorie (CHO:45-15-60-15-60-30) (PRO: 2/3-1-3/4-1-3/-2) meal plan consisting of 3 meals and 3 snacks daily including protein at each    Avoid fasting for > 10 hours overnight  Continue SMBG 4 x per day (Fasting, 2 hour after start of each meal) with a Contour Next EZ glucose meter  If okay by physician, recommend up to 30 minutes of physical activity daily     Lab Work:   Lab Results   Component Value Date    HGBA1C 7.5 (H) 2024      HbA1c was reordered on 24--encouraged patient complete soon.     Ultrasound: Fetal macrosomia (>99%AC, 98% EFW) (24)    Diabetes Self Management Support Plan outside of ongoing care: Spouse/Family   Patient Stated Goal: \"I will check my blood sugar 4 times each day, as directed by diabetes and pregnancy team\"   Goal Assessment: Not on track    Date to report next: Weekly until delivery or send a message to Jessica Sandy if fasting blood sugars elevated.    Laurita Quinonez, MPH, RD, " KARINEES  Diabetes Educator   [facility]  Diabetes in Pregnancy Program

## 2024-07-13 ENCOUNTER — CLINICAL SUPPORT (OUTPATIENT)
Age: 39
End: 2024-07-13

## 2024-07-13 DIAGNOSIS — Z32.2 ENCOUNTER FOR CHILDBIRTH INSTRUCTION: Primary | ICD-10-CM

## 2024-07-13 NOTE — PROGRESS NOTES
Please refer to the [facility] ultrasound report in Ob Procedures for additional information regarding today's visit

## 2024-07-14 NOTE — H&P
"H & P- Obstetrics   Verito Mata 39 y.o. female MRN: [id number]  Unit/Bed#: LD PACU-02 Encounter: [id number]      Assessment/Plan:    Verito is a 39 y.o.  at 37w0d admitted for scheduled 1LTCS in the setting of prior Shoulder dystocia      History of smoking  Assessment & Plan  Stopping smoking once positive pregnancy     Elevated blood pressure reading without diagnosis of hypertension  Assessment & Plan  Single elevated BP of 144/84 at 30w  Continue monitor BPS  F/u Admit CBC CMP    Pre-existing type 2 diabetes mellitus with hyperglycemia during pregnancy in third trimester (HCC)  Assessment & Plan    Lab Results   Component Value Date    HGBA1C 7.5 (H) 2024       No results for input(s): \"POCGLU\" in the last 72 hours.    Blood Sugar Average: Last 72 hrs:        Tetrahydrocannabinol (THC) use disorder, mild, abuse  Assessment & Plan  No use of THC once finding out pregnant    37 weeks gestation of pregnancy  Assessment & Plan  Admit to OBGYN  CBC, RPR, Blood Type & Screen  3.  Anesthesia consult for spinal  4.  Ancef 3 gm for ppx  5.  NPO   6.  To OR for  delivery      * H/O shoulder dystocia in prior pregnancy, currently pregnant  Assessment & Plan  Plan for 1LTCS               Patient of: [facility]  This patient will be an INPATIENT  and I certify the anticipated length of stay is >2 Midnights  Discussed with Dr. Cantrell      SUBJECTIVE:    Chief Complaint: I am here for my     HPI: Verito Mata is a 39 y.o.  with an VIBHA of 2024, by Ultrasound at 37w0d who is being admitted for scheduled 1LTCS in the setting of prior SD. She denies having uterine contractions, has no LOF, and reports no VB. She states she has felt good FM.. This pregnancy is complicated by the issues above. All other review of systems is negative.       Pregnancy Plan:  Pregnancy: Betancur  Fetal sex: Female  Support person: Daniel Dowling     Delivery Plans  Planned " delivery method: Vaginal  Planned delivery location: [facility] L&D  Planned anesthesia: Epidural  Acceptable blood products: All     Post-Delivery Plans  Feeding intentions: Non-human milk substitute      Patient Active Problem List   Diagnosis    37 weeks gestation of pregnancy    Nausea/vomiting in pregnancy    Tetrahydrocannabinol (THC) use disorder, mild, abuse    Multigravida of advanced maternal age in third trimester    Obesity complicating pregnancy, childbirth, or puerperium, antepartum    Pre-existing type 2 diabetes mellitus with hyperglycemia during pregnancy in third trimester ([facility])    Family history of diabetes mellitus in mother    Maternal obesity, antepartum    BMI 40.0-44.9, adult ([facility])    Hyperglycemia in pregnancy    Excessive fetal growth affecting management of pregnancy in third trimester    Elevated blood pressure reading without diagnosis of hypertension    H/O shoulder dystocia in prior pregnancy, currently pregnant    History of smoking    Excessive weight gain in pregnancy in third trimester       OB History    Para Term  AB Living   7 2 2 0 4 2   SAB IAB Ectopic Multiple Live Births   3 0 1 0 2      # Outcome Date GA Lbr Amaury/2nd Weight Sex Type Anes PTL Lv   7 Current            6 SAB 2020 10w0d    SAB      5 SAB 2020     SAB      4 SAB 2018     SAB      3 Ectopic 2015     ECTOPIC      2 Term 13 40w0d  3175 g (7 lb) F Vag-Spont EPI  TERRELL      Complications: Shoulder Dystocia   1 Term 06 40w0d  3175 g (7 lb) M Vag-Spont EPI  TERRELL       Past Medical History:   Diagnosis Date    Anxiety     Depression     Ectopic pregnancy 2015    Miscarriage     , 2020 x 2    Type 2 diabetes mellitus affecting pregnancy in second trimester, antepartum        Past Surgical History:   Procedure Laterality Date    APPENDECTOMY  2014    DIAGNOSTIC LAPAROSCOPY Left     ectopic- left (tube was cut, not removed per pt)    MULTIPLE TOOTH EXTRACTIONS      all teeth removed  "      Social History     Tobacco Use    Smoking status: Former     Current packs/day: 0.00     Average packs/day: 0.5 packs/day for 20.0 years (10.0 ttl pk-yrs)     Types: Cigarettes     Quit date: 2023     Years since quittin.5    Smokeless tobacco: Never   Substance Use Topics    Alcohol use: Not Currently     Comment: occasionally- not since confirmed pregnancy       No Known Allergies      Medications Prior to Admission:     Blood Glucose Monitoring Suppl (Contour Next EZ) w/Device KIT    Continuous Blood Gluc  (Dexcom G7 ) SIGRID    Continuous Glucose Sensor (Dexcom G7 Sensor)    Contour Next Test test strip    Insulin Glargine Solostar (Lantus SoloStar) 100 UNIT/ML SOPN    insulin lispro (HumALOG/ADMELOG) 100 units/mL injection    Insulin Pen Needle 31G X 5 MM MISC    Microlet Lancets MISC    Prenatal Vit w/Dx-Xdghzjjuq-NA (PNV PO)        OBJECTIVE:  Vitals:  Temp:  [98 °F (36.7 °C)] 98 °F (36.7 °C)  HR:  [100] 100  Resp:  [18] 18  BP: (123)/(70) 123/70  Body mass index is 46.35 kg/m².     Physical Exam:  General: Well appearing, no distress  Respiratory: Unlabored breathing  Cardiovascular: Regular rate.  Abdomen: Soft, gravid, nontender  Fundal Height: Appropriate for gestational age.  Extremities: Warm and well perfused.  Non tender.  Psychiatric: Behavioral normal      FHT:  Reactive NST    TOCO:   No CTX      Prenatal Labs: Blood Type:   Lab Results   Component Value Date/Time    ABO Grouping O 2024 11:38 AM     , D (Rh type):   Lab Results   Component Value Date/Time    Rh Factor Positive 2024 11:38 AM     , Antibody Screen: No results found for: \"ANTIBODYSCR\" , HCT/HGB:   Lab Results   Component Value Date/Time    Hematocrit 39.2 2024 09:13 AM    Hemoglobin 12.7 2024 09:13 AM      , Platelets:   Lab Results   Component Value Date/Time    Platelets 214 2024 09:13 AM      , 1 hour Glucola:   Lab Results   Component Value Date/Time    Glucose 285 (H) " "01/26/2024 09:28 AM   , Varicella: No results found for: \"VARICELLAIGG\"    , Rubella:   Lab Results   Component Value Date/Time    Rubella IgG Quant 48.8 01/11/2024 10:03 AM        , VDRL/RPR: normal     , Urine Culture/Screen:   Lab Results   Component Value Date/Time    Urine Culture 50,000-59,000 cfu/ml 06/17/2024 09:54 AM       , Hep B:   Lab Results   Component Value Date/Time    Hepatitis B Surface Ag Non-reactive 01/11/2024 10:03 AM     , Hep C: Nonreactive    , HIV: normal      Alexandra Trivedi, DO  7/16/2024  9:54 AM        Portions of the record may have been created with voice recognition software.  Occasional wrong word or \"sound a like\" substitutions may have occurred due to the inherent limitations of voice recognition software.  Read the chart carefully and recognize, using context, where substitutions have occurred    "

## 2024-07-15 ENCOUNTER — ANESTHESIA EVENT (INPATIENT)
Dept: LABOR AND DELIVERY | Facility: HOSPITAL | Age: 39
DRG: 540 | End: 2024-07-15
Payer: COMMERCIAL

## 2024-07-15 ENCOUNTER — ULTRASOUND (OUTPATIENT)
Dept: PERINATAL CARE | Facility: OTHER | Age: 39
End: 2024-07-15
Payer: COMMERCIAL

## 2024-07-15 ENCOUNTER — PRE-BIRTH ASSESSMENT (OUTPATIENT)
Dept: LABOR AND DELIVERY | Facility: HOSPITAL | Age: 39
End: 2024-07-15

## 2024-07-15 VITALS
SYSTOLIC BLOOD PRESSURE: 104 MMHG | BODY MASS INDEX: 46.1 KG/M2 | DIASTOLIC BLOOD PRESSURE: 68 MMHG | WEIGHT: 270 LBS | HEIGHT: 64 IN | HEART RATE: 96 BPM

## 2024-07-15 DIAGNOSIS — E11.65 PRE-EXISTING TYPE 2 DIABETES MELLITUS WITH HYPERGLYCEMIA DURING PREGNANCY IN THIRD TRIMESTER (HCC): Chronic | ICD-10-CM

## 2024-07-15 DIAGNOSIS — Z3A.37 37 WEEKS GESTATION OF PREGNANCY: Primary | ICD-10-CM

## 2024-07-15 DIAGNOSIS — O24.113 PRE-EXISTING TYPE 2 DIABETES MELLITUS WITH HYPERGLYCEMIA DURING PREGNANCY IN THIRD TRIMESTER (HCC): Chronic | ICD-10-CM

## 2024-07-15 LAB — GP B STREP DNA SPEC QL NAA+PROBE: NEGATIVE

## 2024-07-15 PROCEDURE — 76815 OB US LIMITED FETUS(S): CPT | Performed by: OBSTETRICS & GYNECOLOGY

## 2024-07-15 PROCEDURE — 59025 FETAL NON-STRESS TEST: CPT | Performed by: OBSTETRICS & GYNECOLOGY

## 2024-07-15 NOTE — ANESTHESIA PREPROCEDURE EVALUATION
Procedure:   SECTION () (Uterus)    Relevant Problems   ANESTHESIA (within normal limits)      CARDIO (within normal limits)      ENDO   (+) Pre-existing type 2 diabetes mellitus with hyperglycemia during pregnancy in third trimester (HCC)      GI/HEPATIC (within normal limits)      /RENAL (within normal limits)      GYN   (+) 36 weeks gestation of pregnancy   (+) Multigravida of advanced maternal age in third trimester      HEMATOLOGY (within normal limits)      MUSCULOSKELETAL (within normal limits)      NEURO/PSYCH (within normal limits)      PULMONARY (within normal limits)      Behavioral Health   (+) History of smoking   (+) Tetrahydrocannabinol (THC) use disorder, mild, abuse      Obstetrics/Gynecology   (+) Excessive fetal growth affecting management of pregnancy in third trimester   (+) Excessive weight gain in pregnancy in third trimester   (+) H/O shoulder dystocia in prior pregnancy, currently pregnant   (+) Maternal obesity, antepartum   (+) Nausea/vomiting in pregnancy   (+) Obesity complicating pregnancy, childbirth, or puerperium, antepartum      Other   (+) BMI 40.0-44.9, adult ([facility])        Physical Exam    Airway    Mallampati score: II  TM Distance: >3 FB  Neck ROM: full     Dental   No notable dental hx     Cardiovascular  Rhythm: regular, Rate: normal, Cardiovascular exam normal    Pulmonary  Pulmonary exam normal Breath sounds clear to auscultation    Other Findings  post-pubertal.      Anesthesia Plan  ASA Score- 3     Anesthesia Type- spinal with ASA Monitors.         Additional Monitors:     Airway Plan:            Plan Factors-Exercise tolerance (METS): >4 METS.    Chart reviewed. EKG reviewed. Imaging results reviewed. Existing labs reviewed. Patient summary reviewed.                  Induction-     Postoperative Plan- Plan for postoperative opioid use.     Perioperative Resuscitation Plan - Level 1 - Full Code.       Informed Consent- Anesthetic plan and risks discussed  "with patient.  I personally reviewed this patient with the CRNA. Discussed and agreed on the Anesthesia Plan with the CRNA..    Recent labs personally reviewed:  Lab Results   Component Value Date    WBC 9.58 06/17/2024    HGB 12.3 06/17/2024     06/17/2024     Lab Results   Component Value Date    K 3.7 06/17/2024    BUN 8 06/17/2024    CREATININE 0.48 (L) 06/17/2024     No results found for: \"PTT\"   No results found for: \"INR\"    Blood type O    Lab Results   Component Value Date    HGBA1C 7.5 (H) 06/17/2024       I, Robel Segundo MD, have personally seen and evaluated the patient prior to anesthetic care.  I have reviewed the pre-anesthetic record, and other medical records if appropriate to the anesthetic care.  If a CRNA is involved in the case, I have reviewed the CRNA assessment, if present, and agree. Risks/benefits and alternatives discussed with patient including possible PONV, sore throat, and possibility of rare anesthetic and surgical emergencies.        "

## 2024-07-15 NOTE — NURSING NOTE
Phone call to patient for pre-operative instructions prior to     Pt was instructed to arrive 2 hours before her scheduled OR time.    Pt instructed to remain NPO after midnight.   *This includes gum, water and hard candy.  *If patient takes AM meds (e.g.hypertensive meds) they may take these meds with a sip of water.    *This should not include medications like prenatal vitamins, aspirin or colace.   *  Type 2 on insulin should take 1/2 dose of their overnight insulin or as instructed by their doctor    Pt should brush their teeth as usual.    Pt was instructed to buy and use a pre-surgical wash containing chlorhexidine the night before and the morning of her scheduled  and to wear clean clothing after the wash.  Pt instructed not to shave operative area prior to surgery.    Pt was asked not to wear any jewelry and to leave all of her valuables at home.    Pt was asked to leave all of her larger bags and suitcases in the car to be brought in after she is assigned a post partum room.  Pt should bring in any paperwork she was given in the office.    Pt was informed that she may have 1 support person in the OR/PACU area and of the current visiting policies.  Support person should eat prior to the surgery.

## 2024-07-15 NOTE — LETTER
July 15, 2024     Lynn Cantrell MD  [address]  [address]  [place] [address]    Patient: Verito Mata   YOB: 1985   Date of Visit: 7/15/2024       Dear Dr. Cantrell:    Thank you for referring Verito Mata to me for evaluation. Below are my notes for this consultation.    If you have questions, please do not hesitate to call me. I look forward to following your patient along with you.         Sincerely,        Boby Davila MD        CC: No Recipients    Boby Davila MD  7/15/2024  9:28 AM  Sign when Signing Visit  Please refer to the [facility] ultrasound report in Ob Procedures for additional information regarding today's visit

## 2024-07-15 NOTE — PROGRESS NOTES
Repeat Non-Stress Testing:    Patient verbalizes +FM. Pt denies ALL:               Leaking of fluid   Contractions   Vaginal bleeding   Decreased fetal movement    Patient is performing daily kick counts. Patient has no questions or concerns.   NST strip reviewed by Dr. Dr. Davila.

## 2024-07-16 ENCOUNTER — HOSPITAL ENCOUNTER (INPATIENT)
Facility: HOSPITAL | Age: 39
LOS: 4 days | Discharge: HOME | DRG: 540 | End: 2024-07-20
Attending: OBSTETRICS & GYNECOLOGY | Admitting: OBSTETRICS & GYNECOLOGY
Payer: COMMERCIAL

## 2024-07-16 ENCOUNTER — ANESTHESIA (INPATIENT)
Dept: LABOR AND DELIVERY | Facility: HOSPITAL | Age: 39
DRG: 540 | End: 2024-07-16
Payer: COMMERCIAL

## 2024-07-16 DIAGNOSIS — O24.113 PRE-EXISTING TYPE 2 DIABETES MELLITUS WITH HYPERGLYCEMIA DURING PREGNANCY IN THIRD TRIMESTER (HCC): ICD-10-CM

## 2024-07-16 DIAGNOSIS — Z98.891 S/P PRIMARY LOW TRANSVERSE C-SECTION: Chronic | ICD-10-CM

## 2024-07-16 DIAGNOSIS — O09.299: ICD-10-CM

## 2024-07-16 DIAGNOSIS — Z3A.36 36 WEEKS GESTATION OF PREGNANCY: Primary | ICD-10-CM

## 2024-07-16 DIAGNOSIS — E11.65 PRE-EXISTING TYPE 2 DIABETES MELLITUS WITH HYPERGLYCEMIA DURING PREGNANCY IN THIRD TRIMESTER (HCC): ICD-10-CM

## 2024-07-16 DIAGNOSIS — O24.119 TYPE 2 DIABETES MELLITUS AFFECTING PREGNANCY, ANTEPARTUM: ICD-10-CM

## 2024-07-16 PROBLEM — Z3A.37 37 WEEKS GESTATION OF PREGNANCY: Status: ACTIVE | Noted: 2023-12-26

## 2024-07-16 LAB
ABO GROUP BLD: NORMAL
BASE EXCESS BLDCOA CALC-SCNC: -1.1 MMOL/L (ref 3–11)
BASE EXCESS BLDCOV CALC-SCNC: -1.3 MMOL/L (ref 1–9)
BLD GP AB SCN SERPL QL: NEGATIVE
ERYTHROCYTE [DISTWIDTH] IN BLOOD BY AUTOMATED COUNT: 16.9 % (ref 11.6–15.1)
GLUCOSE SERPL-MCNC: 106 MG/DL (ref 65–140)
GLUCOSE SERPL-MCNC: 114 MG/DL (ref 65–140)
GLUCOSE SERPL-MCNC: 154 MG/DL (ref 65–140)
GLUCOSE SERPL-MCNC: 96 MG/DL (ref 65–140)
HCO3 BLDCOA-SCNC: 28.1 MMOL/L (ref 17.3–27.3)
HCO3 BLDCOV-SCNC: 26.4 MMOL/L (ref 12.2–28.6)
HCT VFR BLD AUTO: 39.2 % (ref 34.8–46.1)
HGB BLD-MCNC: 12.7 G/DL (ref 11.5–15.4)
HOLD SPECIMEN: NORMAL
MCH RBC QN AUTO: 26.6 PG (ref 26.8–34.3)
MCHC RBC AUTO-ENTMCNC: 32.4 G/DL (ref 31.4–37.4)
MCV RBC AUTO: 82 FL (ref 82–98)
O2 CT VFR BLDCOA CALC: 4.9 ML/DL
OXYHGB MFR BLDCOA: 23.5 %
OXYHGB MFR BLDCOV: 46.3 %
PCO2 BLDCOA: 67.3 MM[HG] (ref 30–60)
PCO2 BLDCOV: 56.5 MM HG (ref 27–43)
PH BLDCOA: 7.24 [PH] (ref 7.23–7.43)
PH BLDCOV: 7.29 [PH] (ref 7.19–7.49)
PLATELET # BLD AUTO: 214 THOUSANDS/UL (ref 149–390)
PMV BLD AUTO: 11 FL (ref 8.9–12.7)
PO2 BLDCOA: 15.4 MM HG (ref 5–25)
PO2 BLDCOV: 22.8 MM HG (ref 15–45)
RBC # BLD AUTO: 4.77 MILLION/UL (ref 3.81–5.12)
RH BLD: POSITIVE
SAO2 % BLDCOV: 9.3 ML/DL
SPECIMEN EXPIRATION DATE: NORMAL
TREPONEMA PALLIDUM IGG+IGM AB [PRESENCE] IN SERUM OR PLASMA BY IMMUNOASSAY: NORMAL
WBC # BLD AUTO: 9.19 THOUSAND/UL (ref 4.31–10.16)

## 2024-07-16 PROCEDURE — NC001 PR NO CHARGE: Performed by: OBSTETRICS & GYNECOLOGY

## 2024-07-16 PROCEDURE — 86900 BLOOD TYPING SEROLOGIC ABO: CPT

## 2024-07-16 PROCEDURE — 59514 CESAREAN DELIVERY ONLY: CPT | Performed by: OBSTETRICS & GYNECOLOGY

## 2024-07-16 PROCEDURE — 82948 REAGENT STRIP/BLOOD GLUCOSE: CPT

## 2024-07-16 PROCEDURE — 99254 IP/OBS CNSLTJ NEW/EST MOD 60: CPT | Performed by: INTERNAL MEDICINE

## 2024-07-16 PROCEDURE — 86780 TREPONEMA PALLIDUM: CPT

## 2024-07-16 PROCEDURE — 86901 BLOOD TYPING SEROLOGIC RH(D): CPT

## 2024-07-16 PROCEDURE — 82805 BLOOD GASES W/O2 SATURATION: CPT | Performed by: OBSTETRICS & GYNECOLOGY

## 2024-07-16 PROCEDURE — 4A0HXCZ MEASUREMENT OF PRODUCTS OF CONCEPTION, CARDIAC RATE, EXTERNAL APPROACH: ICD-10-PCS | Performed by: OBSTETRICS & GYNECOLOGY

## 2024-07-16 PROCEDURE — 85027 COMPLETE CBC AUTOMATED: CPT

## 2024-07-16 PROCEDURE — 88307 TISSUE EXAM BY PATHOLOGIST: CPT | Performed by: STUDENT IN AN ORGANIZED HEALTH CARE EDUCATION/TRAINING PROGRAM

## 2024-07-16 PROCEDURE — 86850 RBC ANTIBODY SCREEN: CPT

## 2024-07-16 PROCEDURE — 94760 N-INVAS EAR/PLS OXIMETRY 1: CPT

## 2024-07-16 RX ORDER — HYDROMORPHONE HCL/PF 1 MG/ML
0.5 SYRINGE (ML) INJECTION
Status: DISCONTINUED | OUTPATIENT
Start: 2024-07-16 | End: 2024-07-17

## 2024-07-16 RX ORDER — HYDROMORPHONE HCL/PF 1 MG/ML
0.5 SYRINGE (ML) INJECTION EVERY 2 HOUR PRN
Status: DISCONTINUED | OUTPATIENT
Start: 2024-07-16 | End: 2024-07-16

## 2024-07-16 RX ORDER — DIPHENHYDRAMINE HCL 25 MG
25 TABLET ORAL EVERY 6 HOURS PRN
Status: DISCONTINUED | OUTPATIENT
Start: 2024-07-16 | End: 2024-07-20 | Stop reason: HOSPADM

## 2024-07-16 RX ORDER — INSULIN LISPRO 100 [IU]/ML
2-12 INJECTION, SOLUTION INTRAVENOUS; SUBCUTANEOUS
Status: DISCONTINUED | OUTPATIENT
Start: 2024-07-16 | End: 2024-07-16

## 2024-07-16 RX ORDER — SENNOSIDES 8.6 MG
1 TABLET ORAL DAILY
Status: DISCONTINUED | OUTPATIENT
Start: 2024-07-16 | End: 2024-07-20 | Stop reason: HOSPADM

## 2024-07-16 RX ORDER — SODIUM CHLORIDE, SODIUM LACTATE, POTASSIUM CHLORIDE, CALCIUM CHLORIDE 600; 310; 30; 20 MG/100ML; MG/100ML; MG/100ML; MG/100ML
125 INJECTION, SOLUTION INTRAVENOUS CONTINUOUS
Status: DISCONTINUED | OUTPATIENT
Start: 2024-07-16 | End: 2024-07-16

## 2024-07-16 RX ORDER — ONDANSETRON 2 MG/ML
4 INJECTION INTRAMUSCULAR; INTRAVENOUS EVERY 8 HOURS PRN
Status: DISCONTINUED | OUTPATIENT
Start: 2024-07-16 | End: 2024-07-16

## 2024-07-16 RX ORDER — ACETAMINOPHEN 325 MG/1
650 TABLET ORAL EVERY 6 HOURS SCHEDULED
Status: DISCONTINUED | OUTPATIENT
Start: 2024-07-16 | End: 2024-07-18

## 2024-07-16 RX ORDER — DEXAMETHASONE SODIUM PHOSPHATE 10 MG/ML
INJECTION, SOLUTION INTRAMUSCULAR; INTRAVENOUS AS NEEDED
Status: DISCONTINUED | OUTPATIENT
Start: 2024-07-16 | End: 2024-07-16

## 2024-07-16 RX ORDER — FENTANYL CITRATE 50 UG/ML
INJECTION, SOLUTION INTRAMUSCULAR; INTRAVENOUS AS NEEDED
Status: DISCONTINUED | OUTPATIENT
Start: 2024-07-16 | End: 2024-07-16

## 2024-07-16 RX ORDER — KETOROLAC TROMETHAMINE 30 MG/ML
INJECTION, SOLUTION INTRAMUSCULAR; INTRAVENOUS AS NEEDED
Status: DISCONTINUED | OUTPATIENT
Start: 2024-07-16 | End: 2024-07-16

## 2024-07-16 RX ORDER — OXYTOCIN/RINGER'S LACTATE 30/500 ML
PLASTIC BAG, INJECTION (ML) INTRAVENOUS CONTINUOUS PRN
Status: DISCONTINUED | OUTPATIENT
Start: 2024-07-16 | End: 2024-07-16

## 2024-07-16 RX ORDER — NALOXONE HYDROCHLORIDE 0.4 MG/ML
0.1 INJECTION, SOLUTION INTRAMUSCULAR; INTRAVENOUS; SUBCUTANEOUS
Status: DISCONTINUED | OUTPATIENT
Start: 2024-07-16 | End: 2024-07-17

## 2024-07-16 RX ORDER — POLYETHYLENE GLYCOL 3350 17 G/17G
17 POWDER, FOR SOLUTION ORAL DAILY
Status: DISCONTINUED | OUTPATIENT
Start: 2024-07-16 | End: 2024-07-20 | Stop reason: HOSPADM

## 2024-07-16 RX ORDER — ONDANSETRON 2 MG/ML
4 INJECTION INTRAMUSCULAR; INTRAVENOUS EVERY 8 HOURS PRN
Status: DISCONTINUED | OUTPATIENT
Start: 2024-07-16 | End: 2024-07-17

## 2024-07-16 RX ORDER — CEFAZOLIN SODIUM 2 G/50ML
2000 SOLUTION INTRAVENOUS ONCE
Status: COMPLETED | OUTPATIENT
Start: 2024-07-16 | End: 2024-07-16

## 2024-07-16 RX ORDER — ONDANSETRON 2 MG/ML
4 INJECTION INTRAMUSCULAR; INTRAVENOUS ONCE AS NEEDED
Status: DISCONTINUED | OUTPATIENT
Start: 2024-07-16 | End: 2024-07-17

## 2024-07-16 RX ORDER — SODIUM CHLORIDE, SODIUM LACTATE, POTASSIUM CHLORIDE, CALCIUM CHLORIDE 600; 310; 30; 20 MG/100ML; MG/100ML; MG/100ML; MG/100ML
125 INJECTION, SOLUTION INTRAVENOUS CONTINUOUS
Status: DISCONTINUED | OUTPATIENT
Start: 2024-07-16 | End: 2024-07-17

## 2024-07-16 RX ORDER — BUPIVACAINE HYDROCHLORIDE 7.5 MG/ML
INJECTION, SOLUTION INTRASPINAL AS NEEDED
Status: DISCONTINUED | OUTPATIENT
Start: 2024-07-16 | End: 2024-07-16

## 2024-07-16 RX ORDER — INSULIN LISPRO 100 [IU]/ML
2-12 INJECTION, SOLUTION INTRAVENOUS; SUBCUTANEOUS
Status: DISCONTINUED | OUTPATIENT
Start: 2024-07-16 | End: 2024-07-20 | Stop reason: HOSPADM

## 2024-07-16 RX ORDER — MORPHINE SULFATE 0.5 MG/ML
INJECTION, SOLUTION EPIDURAL; INTRATHECAL; INTRAVENOUS AS NEEDED
Status: DISCONTINUED | OUTPATIENT
Start: 2024-07-16 | End: 2024-07-16

## 2024-07-16 RX ORDER — FENTANYL CITRATE/PF 50 MCG/ML
25 SYRINGE (ML) INJECTION
Status: DISCONTINUED | OUTPATIENT
Start: 2024-07-16 | End: 2024-07-17

## 2024-07-16 RX ORDER — ENOXAPARIN SODIUM 100 MG/ML
40 INJECTION SUBCUTANEOUS EVERY 12 HOURS
Status: DISCONTINUED | OUTPATIENT
Start: 2024-07-17 | End: 2024-07-20 | Stop reason: HOSPADM

## 2024-07-16 RX ORDER — KETOROLAC TROMETHAMINE 30 MG/ML
30 INJECTION, SOLUTION INTRAMUSCULAR; INTRAVENOUS EVERY 6 HOURS
Status: COMPLETED | OUTPATIENT
Start: 2024-07-16 | End: 2024-07-17

## 2024-07-16 RX ORDER — ONDANSETRON 2 MG/ML
INJECTION INTRAMUSCULAR; INTRAVENOUS AS NEEDED
Status: DISCONTINUED | OUTPATIENT
Start: 2024-07-16 | End: 2024-07-16

## 2024-07-16 RX ORDER — BENZOCAINE/MENTHOL 6 MG-10 MG
1 LOZENGE MUCOUS MEMBRANE DAILY PRN
Status: DISCONTINUED | OUTPATIENT
Start: 2024-07-16 | End: 2024-07-20 | Stop reason: HOSPADM

## 2024-07-16 RX ORDER — CEFAZOLIN SODIUM 1 G/50ML
1000 SOLUTION INTRAVENOUS ONCE
Status: COMPLETED | OUTPATIENT
Start: 2024-07-16 | End: 2024-07-16

## 2024-07-16 RX ADMIN — KETOROLAC TROMETHAMINE 30 MG: 30 INJECTION, SOLUTION INTRAMUSCULAR; INTRAVENOUS at 23:21

## 2024-07-16 RX ADMIN — CEFAZOLIN SODIUM 2000 MG: 2 SOLUTION INTRAVENOUS at 10:23

## 2024-07-16 RX ADMIN — BUPIVACAINE HYDROCHLORIDE IN DEXTROSE 1.6 ML: 7.5 INJECTION, SOLUTION SUBARACHNOID at 10:22

## 2024-07-16 RX ADMIN — PHENYLEPHRINE HYDROCHLORIDE 100 MCG/MIN: 50 INJECTION INTRAVENOUS at 10:23

## 2024-07-16 RX ADMIN — KETOROLAC TROMETHAMINE 30 MG: 30 INJECTION, SOLUTION INTRAMUSCULAR; INTRAVENOUS at 11:09

## 2024-07-16 RX ADMIN — Medication 250 MILLI-UNITS/MIN: at 10:48

## 2024-07-16 RX ADMIN — CEFAZOLIN SODIUM 1000 MG: 1 SOLUTION INTRAVENOUS at 10:28

## 2024-07-16 RX ADMIN — MORPHINE SULFATE 0.1 MG: 0.5 INJECTION, SOLUTION EPIDURAL; INTRATHECAL; INTRAVENOUS at 10:22

## 2024-07-16 RX ADMIN — FENTANYL CITRATE 25 MCG: 50 INJECTION INTRAMUSCULAR; INTRAVENOUS at 13:40

## 2024-07-16 RX ADMIN — SODIUM CHLORIDE, SODIUM LACTATE, POTASSIUM CHLORIDE, AND CALCIUM CHLORIDE 125 ML/HR: .6; .31; .03; .02 INJECTION, SOLUTION INTRAVENOUS at 14:02

## 2024-07-16 RX ADMIN — SODIUM CHLORIDE, SODIUM LACTATE, POTASSIUM CHLORIDE, AND CALCIUM CHLORIDE: .6; .31; .03; .02 INJECTION, SOLUTION INTRAVENOUS at 10:14

## 2024-07-16 RX ADMIN — DEXAMETHASONE SODIUM PHOSPHATE 10 MG: 10 INJECTION INTRAMUSCULAR; INTRAVENOUS at 10:51

## 2024-07-16 RX ADMIN — KETOROLAC TROMETHAMINE 30 MG: 30 INJECTION, SOLUTION INTRAMUSCULAR; INTRAVENOUS at 17:16

## 2024-07-16 RX ADMIN — ONDANSETRON 4 MG: 2 INJECTION INTRAMUSCULAR; INTRAVENOUS at 10:51

## 2024-07-16 RX ADMIN — INSULIN LISPRO 2 UNITS: 100 INJECTION, SOLUTION INTRAVENOUS; SUBCUTANEOUS at 23:22

## 2024-07-16 RX ADMIN — FENTANYL CITRATE 25 MCG: 50 INJECTION INTRAMUSCULAR; INTRAVENOUS at 12:36

## 2024-07-16 RX ADMIN — FENTANYL CITRATE 10 MCG: 50 INJECTION INTRAMUSCULAR; INTRAVENOUS at 10:22

## 2024-07-16 RX ADMIN — ACETAMINOPHEN 650 MG: 325 TABLET, FILM COATED ORAL at 23:22

## 2024-07-16 NOTE — ASSESSMENT & PLAN NOTE
Admit to OBGYN  CBC, RPR, Blood Type & Screen  3.  Anesthesia consult for spinal  4.  Ancef 3 gm for ppx  5.  NPO   6.  To OR for  delivery

## 2024-07-16 NOTE — ASSESSMENT & PLAN NOTE
Plan per endo postpartum: no meds upon discharge, follow up with PCP in 3 months for repeat A1C    Lab Results   Component Value Date    HGBA1C 7.5 (H) 06/17/2024       Recent Labs     07/18/24  1655 07/18/24  2104 07/19/24  0647 07/19/24  1104   POCGLU 127 143* 98 103       Blood Sugar Average: Last 72 hrs:  (P) [id number]

## 2024-07-16 NOTE — DISCHARGE SUMMARY
Discharge Summary - Verito Mata 39 y.o. female MRN: [id number]    Unit/Bed#: LD PACU-02 Encounter: [id number]    Admission Date: 2024     Discharge Date: 24    Patient Active Problem List   Diagnosis    37 weeks gestation of pregnancy    Nausea/vomiting in pregnancy    Tetrahydrocannabinol (THC) use disorder, mild, abuse    Multigravida of advanced maternal age in third trimester    Obesity complicating pregnancy, childbirth, or puerperium, antepartum    Pre-existing type 2 diabetes mellitus with hyperglycemia during pregnancy in third trimester (HCC)    Family history of diabetes mellitus in mother    Maternal obesity, antepartum    BMI 40.0-44.9, adult ([facility])    Hyperglycemia in pregnancy    Excessive fetal growth affecting management of pregnancy in third trimester    Elevated blood pressure reading without diagnosis of hypertension    H/O shoulder dystocia in prior pregnancy, currently pregnant    History of smoking    Excessive weight gain in pregnancy in third trimester       OBGYN Practice: [facility] Course:   Verito Mata is a 39 y.o.  who was admitted at 37w2d for scheduled 1LTCS in the setting of prior shoulder dystocia. Her pregnancy was complicated by the issues above.    Delivery Findings:  Verito delivered a viable female  on 2024 10:46 AM via   . The delivery was uncomplicated    Baby's Weight: 4010 g (8 lb 13.5 oz); 141.45    Apgar scores: 8  and 9  at 1 and 5 minutes, respectively  Anesthesia:  ,   QBL:           was transferred to  nursery. Patient tolerated the procedure well and was transferred to recovery in stable condition.     Her post-partum course was uncomplicated.  Her post-partum pain was well controlled with oral analgesics. On day of discharge she was ambulating, voiding spontaneously, tolerating oral intake and hemodynamically stable. Mom's blood type is O positive and  Rhogam was not given.    She  "was discharged home on postpartum day #4 without complications. Patient was instructed to follow up with her OB as an outpatient and was given appropriate warnings to call doctor or provider if she develops signs of infection or uncontrolled pain.    Discharge Problem List by Issue:   History of smoking  Assessment & Plan  Stopping smoking once positive pregnancy     Elevated blood pressure reading without diagnosis of hypertension  Assessment & Plan  Single elevated BP of 144/84 at 30w  Continue monitor BPS  F/u Admit CBC CMP    Pre-existing type 2 diabetes mellitus with hyperglycemia during pregnancy in third trimester (HCC)  Assessment & Plan    Lab Results   Component Value Date    HGBA1C 7.5 (H) 2024       No results for input(s): \"POCGLU\" in the last 72 hours.    Blood Sugar Average: Last 72 hrs:        Tetrahydrocannabinol (THC) use disorder, mild, abuse  Assessment & Plan  No use of THC once finding out pregnant    37 weeks gestation of pregnancy  Assessment & Plan  Admit to OBGYN  CBC, RPR, Blood Type & Screen  3.  Anesthesia consult for spinal  4.  Ancef 3 gm for ppx  5.  NPO   6.  To OR for  delivery      * H/O shoulder dystocia in prior pregnancy, currently pregnant  Assessment & Plan  Plan for 1LTCS         Disposition: Home    Planned Readmission: No    Discharge Medications:   Please see AVS    Discharge instructions :   -Do not place anything (no partner, tampons or douche) in your vagina for 6 weeks.  -You may walk for exercise for the first 6 weeks then gradually return to your usual activities.   -Please do not drive for 1 week if you have no stitches and for 2 weeks if you have stitches or underwent a  delivery.    -You may take baths or shower per your preference.   -Please look at your bust (breasts) in the mirror daily and call your doctor for redness or tenderness or increased warmth.   - If you have had a  section please look at your incision daily as well and " call provider for increasing redness or steady drainage from the incision.   -Please call your doctor's office if temperature > 100.4*F or 38* C, worsening pain or a foul discharge.    Follow Up:  - Follow up in 1 weeks for postpartum visit    Alondra Marin

## 2024-07-16 NOTE — OP NOTE
OPERATIVE REPORT  PATIENT NAME: Verito Mata    :  1985  MRN: [id number]  Pt Location: [facility] L[facility] OR ROOM 01    SURGERY DATE: 2024    Surgeons and Role:     * Lynn Cantrell MD - Primary     * Alexandra Trivedi DO - Assisting    Preop Diagnosis:  Type 2 diabetes mellitus affecting pregnancy, antepartum [O24.119]  History of shoulder dystocia in prior pregnancy, currently pregnant, unspecified trimester [O09.299]    Post-Op Diagnosis Codes:     * Type 2 diabetes mellitus affecting pregnancy, antepartum [O24.119]     * History of shoulder dystocia in prior pregnancy, currently pregnant, unspecified trimester [O09.299]    Procedure(s) (LRB):   SECTION () (N/A)    Specimen(s):  ID Type Source Tests Collected by Time Destination   1 :  Tissue (Placenta on Hold) OB Only Placenta TISSUE EXAM OB (PLACENTA) ONLY Lynn Cantrell MD 2024 1050    A :  Cord Blood Cord BLOOD GAS, VENOUS, CORD, BLOOD GAS, ARTERIAL, CORD Lynn Cantrell MD 2024 1050        Surgical QBL:  Surgical QBL (mL): 511 mL      Drains:  Urethral Catheter Double-lumen;Non-latex 16 Fr. (Active)   Reasons to continue Urinary Catheter  Post-operative urological requirements 24 1145   Goal for Removal Voiding trial when ambulation improves 24 1145   Site Assessment Clean;Skin intact 24 1145   Collection Container Standard drainage bag 24 1145   Securement Method Securing device (Describe) 24 1145   Number of days: 0       Anesthesia Type:   Spinal    Operative Indications:  Type 2 diabetes mellitus affecting pregnancy, antepartum [O24.119]  History of shoulder dystocia in prior pregnancy, currently pregnant, unspecified trimester [O09.299]     [facility] Classification System:  No Multiple pregnancy, No Transverse or oblique lie, No Breech lie, Gestational age is > or =37 weeks, Multiparous, No Previous uterine scar, Prelabor CD is [place] 4b    Brief History  Verito Mata is a  39 y.o.  at 37w2d admitted for scheduled primary  section in the setting of history of shoulder dystocia, newly diagnosed type 2 diabetes, suspected fetal macrosomia.     Operative Findings:  1. Viable female  on 2024 at 1042 with APGARs of 8 and 9 at 1 and 5 minutes. Fetus weighted 8lb 13oz.  2. Clear amniotic fluid  3. Normal intact placenta with centrally inserted 3VC.  4. Normal uterus, bilateral tubes and ovaries  5. Adhesions absent    Umbilical Cord Venous Blood Gas:  Results from last 7 days   Lab Units 24  1050   PH COV  7.287   PCO2 COV mm HG 56.5*   HCO3 COV mmol/L 26.4   BASE EXC COV mmol/L -1.3*   O2 CT CD VB mL/dL 9.3   O2 HGB, VENOUS CORD % 46.3     Umbilical Cord Arterial Blood Gas:  Results from last 7 days   Lab Units 24  1050   PH COA  7.238   PCO2 COA  67.3*   PO2 COA mm HG 15.4   HCO3 COA mmol/L 28.1*   BASE EXC COA mmol/L -1.1*   O2 CONTENT CORD ART ml/dl 4.9   O2 HGB, ARTERIAL CORD % 23.5       Operative Technique  The patient was taken to the operating room. Spinal anesthesia was adequately established and ancef 3g was given for preoperative prophylaxis. The patient was then placed in the dorsal supine position with a left tilt of the hips. The patient was then prepped with betadine for vaginal prep and chloraprep for abdominal prep and draped in the usual sterile fashion for a Pfannenstiel skin incision.      A time out was performed to confirm correct patient and correct procedure.     A Pfannenstiel incision was made and carried down through the underlying subcutaneous tissue to the fascia using a scalpel. Bovie electrocautery was used for hemostasis in the subcutaneous layer. Rectus fascia was then incised. We then proceeded in Krunal-Patel fashion. All anatomic layers were well-demarcated. The rectus muscles were  and the peritoneum was identified, entered, and extended longitudinally with blunt dissection.     The bladder blade was inserted  and a transverse incision was made in the lower uterine segment using a new surgical blade. The uterine incision was extended cephalad and caudal using blunt dissection. The amniotic sac was entered.    The surgeon's hand was placed into the uterine cavity. The fetal head was identified and elevated through the uterine incision with the assistance of fundal pressure. With gentle traction, the shoulder was delivered, followed by the rest of the fetal body. There was no nuchal cord noted. On delivery the cord was doubly clamped and cut after delayed cord clamping. The infant was then passed off the table to the awaiting  staff. The  was noted to cry spontaneously and moved all extremities. Venous and arterial blood gas, cord blood, and portion of cord was obtained for analysis and routine blood testing. The placenta delivery was then sent to storage. Placenta was noted to be intact with a centrally inserted three-vessel cord.  Oxytocin was administered by IV infusion to enhance uterine contraction. The uterus was cleared of all clots and remaining products of conception.      The uterine incision was re approximated using an 0 Vicryl in a running locked fashion. A second vertical imbricating stitch with the same suture was applied.  A figure-of-eight stitch on the same suture was thrown for adequate hemostasis. The uterine incision was examined and noted to be hemostatic. The posterior cul-de-sac was cleared of all clots and products of conception. The pericolic gutters were cleared of all clots.  The uterine incision was once again reexamined and noted to be hemostatic. The fascia was re approximated using an 0 Vicryl in a running nonlocked fashion. The subcutaneous tissue was irrigated and cleared of all clots and debris. Good hemostasis was noted with Bovie electrocautery. The subcutaneous tissue was reapproximated with 2-0 plain gut. The skin incision was closed using 3-0 monocryl on a kay needle  with exofin. Good hemostasis was noted. Patient tolerated the procedure well. All needle, sponge, and instrument counts were noted to be correct x 2 at the end of the procedure.  Patient was transferred to the recovery room in stable condition. Dr. Cantrell was present and participated in the entire surgery.    Complications:   None apparent    I was present for the entire procedure.    Patient Disposition:  PACU       SIGNATURE: Alexandra Trivedi DO  DATE: July 16, 2024  TIME: 11:53 AM

## 2024-07-16 NOTE — QUICK NOTE
"Obstetrics & Gynecology Progress Note  Verito Mata 39 y.o. female MRN: [id number]  Unit/Bed#: -01 Encounter: [id number]      SUBJECTIVE:    Pain well controlled. She is tolerating PO intake. Voiding status: mccormack in. Flatus: yes. Bowel movement: no. Chest pain/shortness of breath: no. Calf pain: no.    OBJECTIVE:  Vitals:   /54 (BP Location: Right arm)   Pulse 84   Temp 97.6 °F (36.4 °C) (Oral)   Resp 20   Ht 5' 4\" (1.626 m)   Wt 122 kg (270 lb)   LMP 10/15/2023   SpO2 98%   Breastfeeding No   BMI 46.35 kg/m²       Intake/Output Summary (Last 24 hours) at 7/16/2024 1551  Last data filed at 7/16/2024 1402  Gross per 24 hour   Intake 1450 ml   Output 761 ml   Net 689 ml       Invasive Devices       Peripheral Intravenous Line  Duration             Peripheral IV 07/16/24 Dorsal (posterior);Left Hand <1 day              Line  Duration             Subcutaneous Right upper arm -- days              Drain  Duration             Urethral Catheter Double-lumen;Non-latex 16 Fr. <1 day                    Physical Exam:   GEN: appears well, alert and oriented x 3, pleasant and cooperative   CARDIAC: regular rate  PULMONARY: nonlabored breathing  ABDOMEN: soft, no tenderness, no distention, Incision C/D/I, Fundus@U  EXTREMITIES: SCDs on and pumping, nontender    ASSESSMENT/PLAN:  Postop Day #0 s/p 1LTCS, stable. Continue routine postoperative care.  -Out of bed/ ambulation as tolerated   -diabetic diet  -Pain control with tani tyl/motrin, PRN magaly  -FU am labs  - mccormack in place, pull tonight      Anticipate discharge POD #2-4.    Alexandra Trivedi, DO   Obstetrics & Gynecology PGY-II  07/16/24  3:51 PM                "

## 2024-07-16 NOTE — PLAN OF CARE
Problem: PAIN - ADULT  Goal: Verbalizes/displays adequate comfort level or baseline comfort level  Description: Interventions:  - Encourage patient to monitor pain and request assistance  - Assess pain using appropriate pain scale  - Administer analgesics based on type and severity of pain and evaluate response  - Implement non-pharmacological measures as appropriate and evaluate response  - Consider cultural and social influences on pain and pain management  - Notify physician/advanced practitioner if interventions unsuccessful or patient reports new pain  Outcome: Progressing     Problem: INFECTION - ADULT  Goal: Absence or prevention of progression during hospitalization  Description: INTERVENTIONS:  - Assess and monitor for signs and symptoms of infection  - Monitor lab/diagnostic results  - Monitor all insertion sites, i.e. indwelling lines, tubes, and drains  - Monitor endotracheal if appropriate and nasal secretions for changes in amount and color  - [place] appropriate cooling/warming therapies per order  - Administer medications as ordered  - Instruct and encourage patient and family to use good hand hygiene technique  - Identify and instruct in appropriate isolation precautions for identified infection/condition  Outcome: Progressing  Goal: Absence of fever/infection during neutropenic period  Description: INTERVENTIONS:  - Monitor WBC    Outcome: Progressing     Problem: SAFETY ADULT  Goal: Patient will remain free of falls  Description: INTERVENTIONS:  - Educate patient/family on patient safety including physical limitations  - Instruct patient to call for assistance with activity   - Consult OT/PT to assist with strengthening/mobility   - Keep Call bell within reach  - Keep bed low and locked with side rails adjusted as appropriate  - Keep care items and personal belongings within reach  - Initiate and maintain comfort rounds  - Make Fall Risk Sign visible to staff  - Apply yellow socks and bracelet  for high fall risk patients  - Consider moving patient to room near nurses station  Outcome: Progressing  Goal: Maintain or return to baseline ADL function  Description: INTERVENTIONS:  -  Assess patient's ability to carry out ADLs; assess patient's baseline for ADL function and identify physical deficits which impact ability to perform ADLs (bathing, care of mouth/teeth, toileting, grooming, dressing, etc.)  - Assess/evaluate cause of self-care deficits   - Assess range of motion  - Assess patient's mobility; develop plan if impaired  - Assess patient's need for assistive devices and provide as appropriate  - Encourage maximum independence but intervene and supervise when necessary  - Involve family in performance of ADLs  - Assess for home care needs following discharge   - Consider OT consult to assist with ADL evaluation and planning for discharge  - Provide patient education as appropriate  Outcome: Progressing  Goal: Maintains/Returns to pre admission functional level  Description: INTERVENTIONS:  - Perform AM-PAC 6 Click Basic Mobility/ Daily Activity assessment daily.  - Set and communicate daily mobility goal to care team and patient/family/caregiver.   - Collaborate with rehabilitation services on mobility goals if consulted  - Out of bed for toileting  - Record patient progress and toleration of activity level   Outcome: Progressing     Problem: Knowledge Deficit  Goal: Patient/family/caregiver demonstrates understanding of disease process, treatment plan, medications, and discharge instructions  Description: Complete learning assessment and assess knowledge base.  Interventions:  - Provide teaching at level of understanding  - Provide teaching via preferred learning methods  Outcome: Progressing     Problem: DISCHARGE PLANNING  Goal: Discharge to home or other facility with appropriate resources  Description: INTERVENTIONS:  - Identify barriers to discharge w/patient and caregiver  - Arrange for needed  discharge resources and transportation as appropriate  - Identify discharge learning needs (meds, wound care, etc.)  - Arrange for interpretive services to assist at discharge as needed  - Refer to Case Management Department for coordinating discharge planning if the patient needs post-hospital services based on physician/advanced practitioner order or complex needs related to functional status, cognitive ability, or social support system  Outcome: Progressing     Problem: POSTPARTUM  Goal: Experiences normal postpartum course  Description: INTERVENTIONS:  - Monitor maternal vital signs  - Assess uterine involution and lochia  Outcome: Progressing  Goal: Appropriate maternal -  bonding  Description: INTERVENTIONS:  - Identify family support  - Assess for appropriate maternal/infant bonding   -Encourage maternal/infant bonding opportunities  - Referral to  or  as needed  Outcome: Progressing  Goal: Establishment of infant feeding pattern  Description: INTERVENTIONS:  - Assess breast/bottle feeding  - Refer to lactation as needed  Outcome: Progressing  Goal: Incision(s), wounds(s) or drain site(s) healing without S/S of infection  Description: INTERVENTIONS  - Assess and document dressing, incision, wound bed, drain sites and surrounding tissue  - Provide patient and family education  Outcome: Progressing

## 2024-07-16 NOTE — ASSESSMENT & PLAN NOTE
Single elevated BP of 144/84 at 30w  Continue monitor BPS  Admit CBC WNL  Systolic (12hrs), Av , Min:120 , Max:120   Diastolic (12hrs), Av, Min:69, Max:69

## 2024-07-16 NOTE — PLAN OF CARE
Problem: PAIN - ADULT  Goal: Verbalizes/displays adequate comfort level or baseline comfort level  Description: Interventions:  - Encourage patient to monitor pain and request assistance  - Assess pain using appropriate pain scale  - Administer analgesics based on type and severity of pain and evaluate response  - Implement non-pharmacological measures as appropriate and evaluate response  - Consider cultural and social influences on pain and pain management  - Notify physician/advanced practitioner if interventions unsuccessful or patient reports new pain  Outcome: Progressing     Problem: INFECTION - ADULT  Goal: Absence or prevention of progression during hospitalization  Description: INTERVENTIONS:  - Assess and monitor for signs and symptoms of infection  - Monitor lab/diagnostic results  - Monitor all insertion sites, i.e. indwelling lines, tubes, and drains  - Monitor endotracheal if appropriate and nasal secretions for changes in amount and color  - [place] appropriate cooling/warming therapies per order  - Administer medications as ordered  - Instruct and encourage patient and family to use good hand hygiene technique  - Identify and instruct in appropriate isolation precautions for identified infection/condition  Outcome: Progressing  Goal: Absence of fever/infection during neutropenic period  Description: INTERVENTIONS:  - Monitor WBC    Outcome: Progressing     Problem: SAFETY ADULT  Goal: Patient will remain free of falls  Description: INTERVENTIONS:  - Educate patient/family on patient safety including physical limitations  - Instruct patient to call for assistance with activity   - Consult OT/PT to assist with strengthening/mobility   - Keep Call bell within reach  - Keep bed low and locked with side rails adjusted as appropriate  - Keep care items and personal belongings within reach  - Initiate and maintain comfort rounds  - Make Fall Risk Sign visible to staff  - Apply yellow socks and bracelet  for high fall risk patients  - Consider moving patient to room near nurses station  Outcome: Progressing  Goal: Maintain or return to baseline ADL function  Description: INTERVENTIONS:  -  Assess patient's ability to carry out ADLs; assess patient's baseline for ADL function and identify physical deficits which impact ability to perform ADLs (bathing, care of mouth/teeth, toileting, grooming, dressing, etc.)  - Assess/evaluate cause of self-care deficits   - Assess range of motion  - Assess patient's mobility; develop plan if impaired  - Assess patient's need for assistive devices and provide as appropriate  - Encourage maximum independence but intervene and supervise when necessary  - Involve family in performance of ADLs  - Assess for home care needs following discharge   - Consider OT consult to assist with ADL evaluation and planning for discharge  - Provide patient education as appropriate  Outcome: Progressing  Goal: Maintains/Returns to pre admission functional level  Description: INTERVENTIONS:  - Perform AM-PAC 6 Click Basic Mobility/ Daily Activity assessment daily.  - Set and communicate daily mobility goal to care team and patient/family/caregiver.   - Collaborate with rehabilitation services on mobility goals if consulted  - Out of bed for toileting  - Record patient progress and toleration of activity level   Outcome: Progressing     Problem: Knowledge Deficit  Goal: Patient/family/caregiver demonstrates understanding of disease process, treatment plan, medications, and discharge instructions  Description: Complete learning assessment and assess knowledge base.  Interventions:  - Provide teaching at level of understanding  - Provide teaching via preferred learning methods  Outcome: Progressing     Problem: DISCHARGE PLANNING  Goal: Discharge to home or other facility with appropriate resources  Description: INTERVENTIONS:  - Identify barriers to discharge w/patient and caregiver  - Arrange for needed  discharge resources and transportation as appropriate  - Identify discharge learning needs (meds, wound care, etc.)  - Arrange for interpretive services to assist at discharge as needed  - Refer to Case Management Department for coordinating discharge planning if the patient needs post-hospital services based on physician/advanced practitioner order or complex needs related to functional status, cognitive ability, or social support system  Outcome: Progressing     Problem: BIRTH - VAGINAL/ SECTION  Goal: Fetal and maternal status remain reassuring during the birth process  Description: INTERVENTIONS:  - Monitor vital signs  - Monitor fetal heart rate  - Monitor uterine activity  - Monitor labor progression (vaginal delivery)  - DVT prophylaxis  - Antibiotic prophylaxis  Outcome: Progressing  Goal: Emotionally satisfying birthing experience for mother/fetus  Description: Interventions:  - Assess, plan, implement and evaluate the nursing care given to the patient in labor  - Advocate the philosophy that each childbirth experience is a unique experience and support the family's chosen level of involvement and control during the labor process   - Actively participate in both the patient's and family's teaching of the birth process  - Consider cultural, Church and age-specific factors and plan care for the patient in labor  Outcome: Progressing

## 2024-07-16 NOTE — CONSULTS
Consultation - Verito Mata 39 y.o. female MRN: [id number]    Unit/Bed#: LD PACU-02 Encounter: [id number]      Assessment & Plan     Assessment:  This is a 39 y.o.-year-old female with type 2 diabetes, status post  earlier today.     Type 2 diabetes -diagnosed at 15 weeks gestation was noted to have A1c of 7%.  Prior to section was on Lantus 144 nightly and Humalog 32 units 3 times daily before meals.  Received only 72 units of Lantus on 7/15, no mealtime insulin as she was n.p.o. for procedure today.  Given no history of diabetes prior to pregnancy, it is uncertain whether she will require insulin at discharge as insulin sensitivity is expected to improve now that she has delivered.    Plan:  At this time recommend continuing correctional scale insulin algorithm 4 for meals and at bedtime  Accu-Cheks 4 times daily before meals and at bedtime  Continue carb controlled diet while inpatient  Endocrinology follow up outpatient  Plan discussed with primary team and patient  Endocrinology will continue following.          CC: Diabetes Consult    History of Present Illness     HPI: Verito Mata is a 39 y.o. year old female  with a PMH of obesity and tobacco use disorder seen in consult today for management of type 2 diabetes mellitus. Patient is admitted to the hospital for scheduled  section in the setting of history of shoulder dystocia, type 2 diabetes diagnosed in pregnancy and fetal macrosomia, s/p delivery of 8lb 13oz viable female  at 10:42 am today.   She reports being diagnosed at 15 weeks gestation when her A1c was noted to be 7%.  Was initially started on Lantus, prior to presentation for  was on Lantus 144 units nightly and Humalog 32 units 3 times daily before meals.  Taking 72 units of Lantus last night in anticipation of her  today, no Humalog since she was n.p.o. midnight.  During pregnancy she initially checked her sugars using a glucometer, however  was transitioned to Dexcom G7.  Reports fasting sugars ranging from 90-1 20 and sugars ranging from 1 20-1 50.  She reports eating 3 meals per day and having 2-3 snacks daily as instructed by MFM.  Prior to this pregnancy, she has had 2 viable pregnancies resulting in the birth of 2 daughters and multiple miscarriages.  She denies prior history of diabetes or being told that she has high sugars.  Reports family history of diabetes in her mother.  At the time of my evaluation, patient had just returned from PACU. She reports trying to eat half of her sandwich, but became nauseous and had an episode of emesis. She also endorses pain located on her lower abdomen and lightheadedness, but had no other complaints.          Inpatient consult to Endocrinology     Date/Time  7/16/2024 2:03 PM     Performed by  Dory Mcgee MD   Authorized by  Alexandra Trivedi DO             Review of Systems   Constitutional:  Negative for appetite change, fatigue, fever and unexpected weight change.   HENT:  Negative for congestion, rhinorrhea, sore throat and trouble swallowing.    Eyes:  Negative for photophobia and visual disturbance.   Respiratory:  Negative for cough, chest tightness, shortness of breath and wheezing.    Cardiovascular:  Negative for chest pain, palpitations and leg swelling.   Gastrointestinal:  Positive for abdominal pain, nausea and vomiting. Negative for abdominal distention, constipation and diarrhea.   Endocrine: Negative for polydipsia and polyuria.   Genitourinary:  Negative for difficulty urinating, dysuria, frequency and hematuria.   Musculoskeletal:  Negative for arthralgias, back pain and myalgias.   Skin:  Negative for color change, pallor and rash.   Neurological:  Positive for light-headedness and numbness (occasional, bilateral hands). Negative for dizziness, facial asymmetry, weakness and headaches.   Psychiatric/Behavioral:  Negative for agitation, behavioral problems, confusion and dysphoric mood.     All other systems reviewed and are negative.      Historical Information   Past Medical History:   Diagnosis Date    Anxiety     Depression     Ectopic pregnancy 2015    Miscarriage     , 2020 x 2    Type 2 diabetes mellitus affecting pregnancy in second trimester, antepartum      Past Surgical History:   Procedure Laterality Date    APPENDECTOMY  2014    DIAGNOSTIC LAPAROSCOPY Left     ectopic- left (tube was cut, not removed per pt)    MULTIPLE TOOTH EXTRACTIONS      all teeth removed     Social History   Social History     Substance and Sexual Activity   Alcohol Use Not Currently    Comment: occasionally- not since confirmed pregnancy     Social History     Substance and Sexual Activity   Drug Use Not Currently    Types: Marijuana    Comment: medical- quit 23 with confirmed pregnancy     Social History     Tobacco Use   Smoking Status Former    Current packs/day: 0.00    Average packs/day: 0.5 packs/day for 20.0 years (10.0 ttl pk-yrs)    Types: Cigarettes    Quit date: 2023    Years since quittin.5   Smokeless Tobacco Never     Family History:   Family History   Problem Relation Age of Onset    Asthma Mother     Diabetes Mother     COPD Mother     Hepatitis Mother     Asthma Brother     Drug abuse Brother     No Known Problems Daughter     No Known Problems Son     Diabetes Maternal Grandmother     Diabetes Maternal Grandfather        Meds/Allergies   Current Facility-Administered Medications   Medication Dose Route Frequency Provider Last Rate Last Admin    fentaNYL (SUBLIMAZE) injection 25 mcg  25 mcg Intravenous Q3 min PRN Veronica Myers CRNA   25 mcg at 24 1340    HYDROmorphone (DILAUDID) injection 0.5 mg  0.5 mg Intravenous Q10 Min PRN Veronica Myers CRNA        HYDROmorphone (DILAUDID) injection 0.5 mg  0.5 mg Intravenous Q2H PRN Veronica Myers CRNA        ketorolac (TORADOL) injection 30 mg  30 mg Intravenous Q6H Veronica Myers CRNA        lactated ringers infusion  125  "mL/hr Intravenous Continuous Alexandra Muder, DO   New Bag at 07/16/24 1014    lactated ringers infusion  125 mL/hr Intravenous Continuous Alexandra Muder,  mL/hr at 07/16/24 1402 125 mL/hr at 07/16/24 1402    naloxone (NARCAN) injection 0.1 mg  0.1 mg Intravenous Q3 min PRN Veronica Myers, LUMA        ondansetron (ZOFRAN) injection 4 mg  4 mg Intravenous Q8H PRN Alexandra Mendenhaller, DO        ondansetron (ZOFRAN) injection 4 mg  4 mg Intravenous Once PRN Veronica Myers, LUMA         No Known Allergies    Objective   Vitals: Blood pressure 121/60, pulse 81, temperature (!) 97.4 °F (36.3 °C), temperature source Temporal, resp. rate 18, height 5' 4\" (1.626 m), weight 122 kg (270 lb), last menstrual period 10/15/2023, SpO2 93%, unknown if currently breastfeeding.    Intake/Output Summary (Last 24 hours) at 7/16/2024 1404  Last data filed at 7/16/2024 1127  Gross per 24 hour   Intake 1450 ml   Output 611 ml   Net 839 ml     Invasive Devices       Peripheral Intravenous Line  Duration             Peripheral IV 07/16/24 Dorsal (posterior);Left Hand <1 day              Line  Duration             Subcutaneous Right upper arm -- days              Drain  Duration             Urethral Catheter Double-lumen;Non-latex 16 Fr. <1 day                    Physical Exam  Vitals and nursing note reviewed.   Constitutional:       General: She is not in acute distress.     Appearance: Normal appearance. She is obese. She is not ill-appearing, toxic-appearing or diaphoretic.   HENT:      Head: Normocephalic and atraumatic.      Mouth/Throat:      Mouth: Mucous membranes are moist.      Pharynx: No oropharyngeal exudate.   Eyes:      General: No scleral icterus.        Right eye: No discharge.         Left eye: No discharge.      Extraocular Movements: Extraocular movements intact.      Conjunctiva/sclera: Conjunctivae normal.   Cardiovascular:      Rate and Rhythm: Normal rate and regular rhythm.      Heart sounds: Normal heart sounds. No murmur " heard.  Pulmonary:      Effort: Pulmonary effort is normal. No respiratory distress.      Breath sounds: Normal breath sounds. No wheezing, rhonchi or rales.   Abdominal:      General: Bowel sounds are normal. There is distension (in the setting of pregnancy).      Comments: Did not evaluate abdomen in depth as patient just returned from    Musculoskeletal:      Cervical back: Normal range of motion and neck supple.      Right lower leg: Edema present.      Left lower leg: Edema present.      Comments: Trace pitting edema in the lower extremities bilaterally   Skin:     General: Skin is warm and dry.      Coloration: Skin is not jaundiced or pale.   Neurological:      General: No focal deficit present.      Mental Status: She is alert and oriented to person, place, and time. Mental status is at baseline.   Psychiatric:         Mood and Affect: Mood normal.         Behavior: Behavior normal.         Thought Content: Thought content normal.         Judgment: Judgment normal.         The history was obtained from the review of the chart, patient.    Lab Results:    Lab Results   Component Value Date    HGBA1C 7.5 (H) 2024    HGBA1C 7.0 (H) 2024     Lab Results   Component Value Date    CREATININE 0.48 (L) 2024     Lab Results   Component Value Date    WBC 9.19 2024    HGB 12.7 2024    HCT 39.2 2024    MCV 82 2024     2024         Imaging Studies: I have personally reviewed pertinent reports.      Portions of the record may have been created with voice recognition software.

## 2024-07-16 NOTE — ANESTHESIA PROCEDURE NOTES
Spinal Block    Patient location during procedure: OR  Start time: 7/16/2024 10:22 AM  Reason for block: procedure for pain and at surgeon's request  Staffing  Performed by: Veronica Myers CRNA  Authorized by: Rboel Segundo MD    Preanesthetic Checklist  Completed: patient identified, IV checked, site marked, risks and benefits discussed, surgical consent, monitors and equipment checked, pre-op evaluation and timeout performed  Spinal Block  Patient position: sitting  Prep: ChloraPrep and site prepped and draped  Patient monitoring: frequent blood pressure checks, continuous pulse ox and heart rate  Approach: midline  Location: L3-4  Needle  Needle type: Pencan   Needle gauge: 24 G  Needle length: 4 in  Assessment  Sensory level: T4  Injection Assessment:  negative aspiration for heme, no paresthesia on injection and positive aspiration for clear CSF.  Post-procedure:  site cleaned

## 2024-07-16 NOTE — ANESTHESIA POSTPROCEDURE EVALUATION
Post-Op Assessment Note    CV Status:  Stable  Pain Score: 0    Pain management: adequate       Mental Status:  Alert and awake   Hydration Status:  Euvolemic   PONV Controlled:  Controlled   Airway Patency:  Patent     Post Op Vitals Reviewed: Yes    No anethesia notable event occurred.    Staff: CRNA               BP   109/55   Temp      Pulse  92   Resp      SpO2   95

## 2024-07-17 PROBLEM — Z98.891 S/P PRIMARY LOW TRANSVERSE C-SECTION: Status: ACTIVE | Noted: 2024-07-17

## 2024-07-17 LAB
ERYTHROCYTE [DISTWIDTH] IN BLOOD BY AUTOMATED COUNT: 16.9 % (ref 11.6–15.1)
GLUCOSE SERPL-MCNC: 100 MG/DL (ref 65–140)
GLUCOSE SERPL-MCNC: 103 MG/DL (ref 65–140)
GLUCOSE SERPL-MCNC: 118 MG/DL (ref 65–140)
GLUCOSE SERPL-MCNC: 156 MG/DL (ref 65–140)
HCT VFR BLD AUTO: 35.6 % (ref 34.8–46.1)
HGB BLD-MCNC: 11.4 G/DL (ref 11.5–15.4)
MCH RBC QN AUTO: 26.6 PG (ref 26.8–34.3)
MCHC RBC AUTO-ENTMCNC: 32 G/DL (ref 31.4–37.4)
MCV RBC AUTO: 83 FL (ref 82–98)
PLATELET # BLD AUTO: 219 THOUSANDS/UL (ref 149–390)
PMV BLD AUTO: 11.7 FL (ref 8.9–12.7)
RBC # BLD AUTO: 4.29 MILLION/UL (ref 3.81–5.12)
WBC # BLD AUTO: 13.15 THOUSAND/UL (ref 4.31–10.16)

## 2024-07-17 PROCEDURE — 85027 COMPLETE CBC AUTOMATED: CPT

## 2024-07-17 PROCEDURE — 99024 POSTOP FOLLOW-UP VISIT: CPT | Performed by: OBSTETRICS & GYNECOLOGY

## 2024-07-17 PROCEDURE — 82948 REAGENT STRIP/BLOOD GLUCOSE: CPT

## 2024-07-17 RX ORDER — SIMETHICONE 80 MG
80 TABLET,CHEWABLE ORAL 4 TIMES DAILY PRN
Status: DISCONTINUED | OUTPATIENT
Start: 2024-07-17 | End: 2024-07-20 | Stop reason: HOSPADM

## 2024-07-17 RX ORDER — CALCIUM CARBONATE 500 MG/1
1000 TABLET, CHEWABLE ORAL DAILY PRN
Status: DISCONTINUED | OUTPATIENT
Start: 2024-07-17 | End: 2024-07-20 | Stop reason: HOSPADM

## 2024-07-17 RX ORDER — MAGNESIUM HYDROXIDE/ALUMINUM HYDROXICE/SIMETHICONE 120; 1200; 1200 MG/30ML; MG/30ML; MG/30ML
15 SUSPENSION ORAL EVERY 6 HOURS PRN
Status: DISCONTINUED | OUTPATIENT
Start: 2024-07-17 | End: 2024-07-20 | Stop reason: HOSPADM

## 2024-07-17 RX ORDER — OXYCODONE HYDROCHLORIDE 10 MG/1
10 TABLET ORAL EVERY 4 HOURS PRN
Status: DISCONTINUED | OUTPATIENT
Start: 2024-07-17 | End: 2024-07-20 | Stop reason: HOSPADM

## 2024-07-17 RX ORDER — IBUPROFEN 600 MG/1
600 TABLET ORAL EVERY 6 HOURS
Status: DISCONTINUED | OUTPATIENT
Start: 2024-07-17 | End: 2024-07-20 | Stop reason: HOSPADM

## 2024-07-17 RX ORDER — OXYCODONE HYDROCHLORIDE 5 MG/1
5 TABLET ORAL EVERY 4 HOURS PRN
Status: DISCONTINUED | OUTPATIENT
Start: 2024-07-17 | End: 2024-07-20 | Stop reason: HOSPADM

## 2024-07-17 RX ORDER — KETOROLAC TROMETHAMINE 30 MG/ML
30 INJECTION, SOLUTION INTRAMUSCULAR; INTRAVENOUS EVERY 6 HOURS PRN
Status: DISCONTINUED | OUTPATIENT
Start: 2024-07-17 | End: 2024-07-17

## 2024-07-17 RX ADMIN — INSULIN LISPRO 2 UNITS: 100 INJECTION, SOLUTION INTRAVENOUS; SUBCUTANEOUS at 22:53

## 2024-07-17 RX ADMIN — IBUPROFEN 600 MG: 600 TABLET, FILM COATED ORAL at 18:16

## 2024-07-17 RX ADMIN — ACETAMINOPHEN 650 MG: 325 TABLET, FILM COATED ORAL at 05:12

## 2024-07-17 RX ADMIN — OXYCODONE HYDROCHLORIDE 10 MG: 10 TABLET ORAL at 22:51

## 2024-07-17 RX ADMIN — OXYCODONE HYDROCHLORIDE 5 MG: 5 TABLET ORAL at 17:13

## 2024-07-17 RX ADMIN — KETOROLAC TROMETHAMINE 30 MG: 30 INJECTION, SOLUTION INTRAMUSCULAR at 12:05

## 2024-07-17 RX ADMIN — ACETAMINOPHEN 650 MG: 325 TABLET, FILM COATED ORAL at 17:13

## 2024-07-17 RX ADMIN — SODIUM CHLORIDE 1000 ML: 0.9 INJECTION, SOLUTION INTRAVENOUS at 17:30

## 2024-07-17 RX ADMIN — ACETAMINOPHEN 650 MG: 325 TABLET, FILM COATED ORAL at 11:50

## 2024-07-17 RX ADMIN — KETOROLAC TROMETHAMINE 30 MG: 30 INJECTION, SOLUTION INTRAMUSCULAR; INTRAVENOUS at 05:12

## 2024-07-17 RX ADMIN — ENOXAPARIN SODIUM 40 MG: 40 INJECTION SUBCUTANEOUS at 22:50

## 2024-07-17 RX ADMIN — ENOXAPARIN SODIUM 40 MG: 40 INJECTION SUBCUTANEOUS at 11:19

## 2024-07-17 NOTE — PROGRESS NOTES
"Progress Note - OB/GYN   Verito Mata 39 y.o. female MRN: [id number]  Unit/Bed#: -01 Encounter: [id number]      Assessment/Plan    Verito Mata is a 39 y.o.  who is POD #1 s/p 1LTCS for hx of shoulder dystocia at 37w2d     S/P primary low transverse   Assessment & Plan  Routine postpartum care  Encourage ambulation  Encourage familial bonding  Lactation support as needed  Pain: Motrin/Tylenol around the clock, oxycodone if needed  Pre-delivery Hgb 12.7 --> Post Delivery 11.4  Alvares catheter: out, for void trial  DVT Ppx: ambulation, SCDs, Lovenox 40mg BID to start         History of smoking  Assessment & Plan  Stopping smoking once positive pregnancy     Elevated blood pressure reading without diagnosis of hypertension  Assessment & Plan  Single elevated BP of 144/84 at 30w  Continue monitor BPS  Admit CBC WNL  F/u add on CMP    Systolic (12hrs), Av , Min:104 , Max:117   Diastolic (12hrs), Av, Min:56, Max:68        Pre-existing type 2 diabetes mellitus with hyperglycemia during pregnancy in third trimester (HCC)  Assessment & Plan    Lab Results   Component Value Date    HGBA1C 7.5 (H) 2024       No results for input(s): \"POCGLU\" in the last 72 hours.    Blood Sugar Average: Last 72 hrs:        Tetrahydrocannabinol (THC) use disorder, mild, abuse  Assessment & Plan  No use of THC once finding out pregnant           Disposition: Anticipate discharge home postpartum Day #2-4    Subjective/Objective     Subjective: Postpartum state    Pain: no  Tolerating PO: yes  Voiding: yes  Flatus: yes  Ambulating: yes  Breastfeeding: Bottle feeding  Chest pain: no  Shortness of breath: no  Leg pain: no  Lochia: wnl    Objective:     Vitals:  Vitals:    24 1759 24 2341 24 0459   BP: 117/68 112/56 104/58 107/64   BP Location: Right arm Right arm Right arm Right arm   Pulse: 82 85 78 85   Resp:  18   Temp: 98.1 °F (36.7 °C) 97.6 °F (36.4 °C) " 97.9 °F (36.6 °C) 98 °F (36.7 °C)   TempSrc: Oral Oral Oral Oral   SpO2: 94% 95% 96% 99%   Weight:       Height:           Physical Exam:   GEN: appears well, alert and oriented x 3, pleasant and cooperative   CV: Regular rate  RESP: non labored breathing  ABDOMEN: soft, no tenderness, no distention, Uterine fundus firm and non-tender, -1 cm below the umbilicus, incision c/d/i  EXTREMITIES: non-tender  NEURO Alert and oriented to person, place, and time.       Lab Results   Component Value Date    WBC 13.15 (H) 07/17/2024    HGB 11.4 (L) 07/17/2024    HCT 35.6 07/17/2024    MCV 83 07/17/2024     07/17/2024         Alexandra Trivedi DO  Obstetrics & Gynecology  07/17/24

## 2024-07-17 NOTE — ASSESSMENT & PLAN NOTE
Routine postpartum care  Encourage ambulation  Encourage familial bonding  Lactation support as needed  Pain: Motrin/Tylenol around the clock, oxycodone if needed  Pre-delivery Hgb 12.7 --> Post Delivery 11.4  Alvares catheter: passed VT  DVT Ppx: ambulation, SCDs, Lovenox 40mg BID (BMI 46)

## 2024-07-17 NOTE — CASE MANAGEMENT
Case Management Progress Note    Patient name Verito Mata  Location /-01 MRN [id number]  : 1985 Date 2024       LOS (days): 1  Geometric Mean LOS (GMLOS) (days):   Days to GMLOS:        OBJECTIVE:        Current admission status: Inpatient  Preferred Pharmacy:   [id number] - [place] [place] - [address]  [address]  [place] [address]  Phone: [phone] Fax: [phone]    [id number] [place] [place] [facility] [address]  [address] [address]  Phone: [phone] Fax: [phone]    [facility] [place] - [address]  [address]  [facility] [address]  Phone: [phone] Fax: [phone]    Primary Care Provider: No primary care provider on file.    Primary Insurance: [facility]  Secondary Insurance:     PROGRESS NOTE:      CM met with MOB to introduce CM services, complete assessment, and provide CM contact info.    MOB had Significant Other present and verbalized agreement with personal interview with them present.    MOB reported the following:    Assessment:  Consult reason: NICU Admission  Gestational Age at Birth: 37 Weeks + 2 Days  MOB Name (& age if teen):   Verito WALKERB Name (& age if teen MOB): Daniel Dowling     Other Legal Guardian(s) for Baby:    n/a  Other Children:   2 ( Ages 18 and 11)  Housing Plan/Lives with: MOB FOB and children    Insurance Coverage/Plan for Baby: MOB verbalizes that they will contact their insurance to add baby ASAP.   Support System: Family and Spouse/Significant Other  Care Items: Car Seat, Crib/Bassinet (Safe Sleep Space), Diapers/Wipes, and Clothing  Method of Feeding: Bottle Feeding  Breast Pump: Declines need for breast pump  Government Assistance Programs: WIC (Special Supplemental Nutrition Program for Women, Infants, and Children) and SNAP (Supplemental Nutrition Assistance Program)   Arrangements: MOB  Current Employment/Schooling: [employer]  employed Part Time  Mental Health History and/or Treatment:   None reported   Substance Use History and/or Treatment:   None reported   Urine Drug Screen Results: Not Applicable  Children & Youth History: None  Current Legal Issues: N/A  Domestic/Intimate Partner Violence History: Denies.  NICU Resources: NICU Packet Provided    Discharge Plan:  Pediatrician:   TBMARI  Prenatal/ Care:  ANDREA  Follow-Up Appointments Needed/Scheduled: TBD  Medications/DME/Other Referrals: TBD  Transportation Plan: MOB has a vehicle    Follow-Up Needed from Care Management:         No current needs.

## 2024-07-18 LAB
GLUCOSE SERPL-MCNC: 113 MG/DL (ref 65–140)
GLUCOSE SERPL-MCNC: 127 MG/DL (ref 65–140)
GLUCOSE SERPL-MCNC: 143 MG/DL (ref 65–140)
GLUCOSE SERPL-MCNC: 94 MG/DL (ref 65–140)

## 2024-07-18 PROCEDURE — 82948 REAGENT STRIP/BLOOD GLUCOSE: CPT

## 2024-07-18 PROCEDURE — 99024 POSTOP FOLLOW-UP VISIT: CPT | Performed by: STUDENT IN AN ORGANIZED HEALTH CARE EDUCATION/TRAINING PROGRAM

## 2024-07-18 RX ORDER — ACETAMINOPHEN 325 MG/1
650 TABLET ORAL EVERY 6 HOURS PRN
Status: DISCONTINUED | OUTPATIENT
Start: 2024-07-18 | End: 2024-07-20 | Stop reason: HOSPADM

## 2024-07-18 RX ORDER — LIDOCAINE 50 MG/G
1 PATCH TOPICAL DAILY
Status: DISCONTINUED | OUTPATIENT
Start: 2024-07-19 | End: 2024-07-19

## 2024-07-18 RX ADMIN — ENOXAPARIN SODIUM 40 MG: 40 INJECTION SUBCUTANEOUS at 20:18

## 2024-07-18 RX ADMIN — IBUPROFEN 600 MG: 600 TABLET, FILM COATED ORAL at 07:27

## 2024-07-18 RX ADMIN — ENOXAPARIN SODIUM 40 MG: 40 INJECTION SUBCUTANEOUS at 10:46

## 2024-07-18 RX ADMIN — ACETAMINOPHEN 650 MG: 325 TABLET, FILM COATED ORAL at 07:27

## 2024-07-18 RX ADMIN — SENNOSIDES 8.6 MG: 8.6 TABLET, FILM COATED ORAL at 08:16

## 2024-07-18 RX ADMIN — ACETAMINOPHEN 650 MG: 325 TABLET, FILM COATED ORAL at 16:59

## 2024-07-18 RX ADMIN — IBUPROFEN 600 MG: 600 TABLET, FILM COATED ORAL at 14:06

## 2024-07-18 RX ADMIN — OXYCODONE HYDROCHLORIDE 5 MG: 5 TABLET ORAL at 08:22

## 2024-07-18 RX ADMIN — IBUPROFEN 600 MG: 600 TABLET, FILM COATED ORAL at 20:13

## 2024-07-18 RX ADMIN — OXYCODONE HYDROCHLORIDE 10 MG: 10 TABLET ORAL at 22:44

## 2024-07-18 RX ADMIN — OXYCODONE HYDROCHLORIDE 5 MG: 5 TABLET ORAL at 14:06

## 2024-07-18 NOTE — PROGRESS NOTES
"Progress Note - OB/GYN   Verito Mata 39 y.o. female MRN: [id number]  Unit/Bed#: -01 Encounter: [id number]      Assessment/Plan    Verito Mata is a 39 y.o.  who is POD #2 s/p RLTCS at 37w2d     S/P primary low transverse   Assessment & Plan  Routine postpartum care  Encourage ambulation  Encourage familial bonding  Lactation support as needed  Pain: Motrin/Tylenol around the clock, oxycodone if needed  Pre-delivery Hgb 12.7 --> Post Delivery 11.4  Alvares catheter: passed VT  DVT Ppx: ambulation, SCDs, Lovenox 40mg BID to start         History of smoking  Assessment & Plan  Stopping smoking once positive pregnancy     Elevated blood pressure reading without diagnosis of hypertension  Assessment & Plan  Single elevated BP of 144/84 at 30w  Continue monitor BPS  Admit CBC WNL  F/u add on CMP    Systolic (12hrs), Av , Min:129 , Max:129   Diastolic (12hrs), Av, Min:76, Max:79        Pre-existing type 2 diabetes mellitus with hyperglycemia during pregnancy in third trimester (HCC)  Assessment & Plan    Lab Results   Component Value Date    HGBA1C 7.5 (H) 2024       No results for input(s): \"POCGLU\" in the last 72 hours.    Blood Sugar Average: Last 72 hrs:        Tetrahydrocannabinol (THC) use disorder, mild, abuse  Assessment & Plan  No use of THC once finding out pregnant           Disposition: Anticipate discharge home postpartum Day #2-4      Subjective/Objective     Subjective: Postpartum state    Pain: no  Tolerating PO: yes  Voiding: yes  Flatus: yes  BM: no  Ambulating: yes  Breastfeeding: Breastfeeding  Chest pain: no  Shortness of breath: no  Leg pain: no  Lochia: wnl    Objective:     Vitals:  Vitals:    24 0459 24 1147 24 1953 24 0041   BP: 107/64 109/62 129/79 129/76   BP Location: Right arm Right arm Left arm Right arm   Pulse: 85 97 77 84   Resp:  18 17 16   Temp: 98 °F (36.7 °C) 98.1 °F (36.7 °C) 98 °F (36.7 °C) 98.1 °F (36.7 °C) "   TempSrc: Oral Oral Oral Oral   SpO2: 99% 100% 96% 98%   Weight:       Height:           Physical Exam:   GEN: appears well, alert and oriented x 3, pleasant and cooperative   CV: Regular rate  RESP: non labored breathing  ABDOMEN: soft, no tenderness, no distention, Uterine fundus firm and non-tender, -1 cm below the umbilicus, incision c/d/i  EXTREMITIES: non-tender  NEURO Alert and oriented to person, place, and time.       Lab Results   Component Value Date    WBC 13.15 (H) 07/17/2024    HGB 11.4 (L) 07/17/2024    HCT 35.6 07/17/2024    MCV 83 07/17/2024     07/17/2024         Alexandra Trivedi DO  Obstetrics & Gynecology  07/18/24

## 2024-07-18 NOTE — PLAN OF CARE
Problem: PAIN - ADULT  Goal: Verbalizes/displays adequate comfort level or baseline comfort level  Description: Interventions:  - Encourage patient to monitor pain and request assistance  - Assess pain using appropriate pain scale  - Administer analgesics based on type and severity of pain and evaluate response  - Implement non-pharmacological measures as appropriate and evaluate response  - Consider cultural and social influences on pain and pain management  - Notify physician/advanced practitioner if interventions unsuccessful or patient reports new pain  Outcome: Progressing     Problem: INFECTION - ADULT  Goal: Absence or prevention of progression during hospitalization  Description: INTERVENTIONS:  - Assess and monitor for signs and symptoms of infection  - Monitor lab/diagnostic results  - Monitor all insertion sites, i.e. indwelling lines, tubes, and drains  - Monitor endotracheal if appropriate and nasal secretions for changes in amount and color  - [place] appropriate cooling/warming therapies per order  - Administer medications as ordered  - Instruct and encourage patient and family to use good hand hygiene technique  - Identify and instruct in appropriate isolation precautions for identified infection/condition  Outcome: Progressing     Problem: SAFETY ADULT  Goal: Patient will remain free of falls  Description: INTERVENTIONS:  - Educate patient/family on patient safety including physical limitations  - Instruct patient to call for assistance with activity   - Consult OT/PT to assist with strengthening/mobility   - Keep Call bell within reach  - Keep bed low and locked with side rails adjusted as appropriate  - Keep care items and personal belongings within reach  - Initiate and maintain comfort rounds  - Make Fall Risk Sign visible to staff  - Offer Toileting every  Hours, in advance of need  - Initiate/Maintain alarm  - Obtain necessary fall risk management equipment:   - Apply yellow socks and  bracelet for high fall risk patients  - Consider moving patient to room near nurses station  Outcome: Progressing     Problem: Knowledge Deficit  Goal: Patient/family/caregiver demonstrates understanding of disease process, treatment plan, medications, and discharge instructions  Description: Complete learning assessment and assess knowledge base.  Interventions:  - Provide teaching at level of understanding  - Provide teaching via preferred learning methods  Outcome: Progressing     Problem: DISCHARGE PLANNING  Goal: Discharge to home or other facility with appropriate resources  Description: INTERVENTIONS:  - Identify barriers to discharge w/patient and caregiver  - Arrange for needed discharge resources and transportation as appropriate  - Identify discharge learning needs (meds, wound care, etc.)  - Arrange for interpretive services to assist at discharge as needed  - Refer to Case Management Department for coordinating discharge planning if the patient needs post-hospital services based on physician/advanced practitioner order or complex needs related to functional status, cognitive ability, or social support system  Outcome: Progressing     Problem: POSTPARTUM  Goal: Experiences normal postpartum course  Description: INTERVENTIONS:  - Monitor maternal vital signs  - Assess uterine involution and lochia  Outcome: Progressing  Goal: Appropriate maternal -  bonding  Description: INTERVENTIONS:  - Identify family support  - Assess for appropriate maternal/infant bonding   -Encourage maternal/infant bonding opportunities  - Referral to  or  as needed  Outcome: Progressing  Goal: Establishment of infant feeding pattern  Description: INTERVENTIONS:  - Assess breast/bottle feeding  - Refer to lactation as needed  Outcome: Progressing  Goal: Incision(s), wounds(s) or drain site(s) healing without S/S of infection  Description: INTERVENTIONS  - Assess and document dressing, incision,  wound bed, drain sites and surrounding tissue  - Provide patient and family education  - Perform skin care/dressing changes every  Outcome: Progressing

## 2024-07-18 NOTE — QUICK NOTE
Chart and blood sugars reviewed. Sugars seen to have been in target range over the past 24 hours. Recommend patient be monitored off medications at discharge. She should ideally follow up with her PCP and obtain a repeat HbA1C in 3 months. Recommendations relayed to primary team.

## 2024-07-19 PROBLEM — Z98.891 S/P PRIMARY LOW TRANSVERSE C-SECTION: Chronic | Status: ACTIVE | Noted: 2024-07-17

## 2024-07-19 LAB
GLUCOSE SERPL-MCNC: 103 MG/DL (ref 65–140)
GLUCOSE SERPL-MCNC: 135 MG/DL (ref 65–140)
GLUCOSE SERPL-MCNC: 139 MG/DL (ref 65–140)
GLUCOSE SERPL-MCNC: 98 MG/DL (ref 65–140)

## 2024-07-19 PROCEDURE — 99024 POSTOP FOLLOW-UP VISIT: CPT | Performed by: OBSTETRICS & GYNECOLOGY

## 2024-07-19 PROCEDURE — 82948 REAGENT STRIP/BLOOD GLUCOSE: CPT

## 2024-07-19 RX ORDER — LIDOCAINE 50 MG/G
1 PATCH TOPICAL ONCE
Qty: 9 PATCH | Refills: 0 | Status: CANCELLED | OUTPATIENT
Start: 2024-07-19 | End: 2024-07-19

## 2024-07-19 RX ORDER — LIDOCAINE 50 MG/G
1 PATCH TOPICAL EVERY 24 HOURS
Status: DISCONTINUED | OUTPATIENT
Start: 2024-07-20 | End: 2024-07-20 | Stop reason: HOSPADM

## 2024-07-19 RX ORDER — LIDOCAINE 50 MG/G
1 PATCH TOPICAL ONCE
Status: COMPLETED | OUTPATIENT
Start: 2024-07-19 | End: 2024-07-19

## 2024-07-19 RX ADMIN — IBUPROFEN 600 MG: 600 TABLET, FILM COATED ORAL at 01:05

## 2024-07-19 RX ADMIN — SENNOSIDES 8.6 MG: 8.6 TABLET, FILM COATED ORAL at 07:46

## 2024-07-19 RX ADMIN — ENOXAPARIN SODIUM 40 MG: 40 INJECTION SUBCUTANEOUS at 20:30

## 2024-07-19 RX ADMIN — OXYCODONE HYDROCHLORIDE 5 MG: 5 TABLET ORAL at 16:54

## 2024-07-19 RX ADMIN — LIDOCAINE 5% 1 PATCH: 700 PATCH TOPICAL at 01:04

## 2024-07-19 RX ADMIN — IBUPROFEN 600 MG: 600 TABLET, FILM COATED ORAL at 20:30

## 2024-07-19 RX ADMIN — IBUPROFEN 600 MG: 600 TABLET, FILM COATED ORAL at 07:46

## 2024-07-19 RX ADMIN — IBUPROFEN 600 MG: 600 TABLET, FILM COATED ORAL at 15:00

## 2024-07-19 RX ADMIN — OXYCODONE HYDROCHLORIDE 5 MG: 5 TABLET ORAL at 22:50

## 2024-07-19 RX ADMIN — OXYCODONE HYDROCHLORIDE 10 MG: 10 TABLET ORAL at 10:21

## 2024-07-19 RX ADMIN — POLYETHYLENE GLYCOL 3350 17 G: 17 POWDER, FOR SOLUTION ORAL at 22:49

## 2024-07-19 RX ADMIN — ENOXAPARIN SODIUM 40 MG: 40 INJECTION SUBCUTANEOUS at 07:53

## 2024-07-19 RX ADMIN — ACETAMINOPHEN 650 MG: 325 TABLET, FILM COATED ORAL at 06:47

## 2024-07-19 NOTE — PLAN OF CARE
Problem: PAIN - ADULT  Goal: Verbalizes/displays adequate comfort level or baseline comfort level  Description: Interventions:  - Encourage patient to monitor pain and request assistance  - Assess pain using appropriate pain scale  - Administer analgesics based on type and severity of pain and evaluate response  - Implement non-pharmacological measures as appropriate and evaluate response  - Consider cultural and social influences on pain and pain management  - Notify physician/advanced practitioner if interventions unsuccessful or patient reports new pain  Outcome: Progressing     Problem: INFECTION - ADULT  Goal: Absence or prevention of progression during hospitalization  Description: INTERVENTIONS:  - Assess and monitor for signs and symptoms of infection  - Monitor lab/diagnostic results  - Monitor all insertion sites, i.e. indwelling lines, tubes, and drains  - [place] appropriate cooling/warming therapies per order  - Administer medications as ordered  - Instruct and encourage patient and family to use good hand hygiene technique  - Identify and instruct in appropriate isolation precautions for identified infection/condition  Outcome: Progressing     Problem: SAFETY ADULT  Goal: Patient will remain free of falls  Description: INTERVENTIONS:  - Educate patient/family on patient safety including physical limitations  - Instruct patient to call for assistance with activity   - Consult OT/PT to assist with strengthening/mobility   - Keep Call bell within reach  - Keep bed low and locked with side rails adjusted as appropriate  - Keep care items and personal belongings within reach  - Initiate and maintain comfort rounds  Outcome: Progressing     Problem: Knowledge Deficit  Goal: Patient/family/caregiver demonstrates understanding of disease process, treatment plan, medications, and discharge instructions  Description: Complete learning assessment and assess knowledge base.  Interventions:  - Provide teaching  at level of understanding  - Provide teaching via preferred learning methods  Outcome: Progressing     Problem: DISCHARGE PLANNING  Goal: Discharge to home or other facility with appropriate resources  Description: INTERVENTIONS:  - Identify barriers to discharge w/patient and caregiver  - Arrange for needed discharge resources and transportation as appropriate  - Identify discharge learning needs (meds, wound care, etc.)  - Arrange for interpretive services to assist at discharge as needed  - Refer to Case Management Department for coordinating discharge planning if the patient needs post-hospital services based on physician/advanced practitioner order or complex needs related to functional status, cognitive ability, or social support system  Outcome: Progressing     Problem: POSTPARTUM  Goal: Experiences normal postpartum course  Description: INTERVENTIONS:  - Monitor maternal vital signs  - Assess uterine involution and lochia  Outcome: Progressing  Goal: Appropriate maternal -  bonding  Description: INTERVENTIONS:  - Identify family support  - Assess for appropriate maternal/infant bonding   -Encourage maternal/infant bonding opportunities  - Referral to  or  as needed  Outcome: Progressing  Goal: Establishment of infant feeding pattern  Description: INTERVENTIONS:  - Assess breast/bottle feeding  - Refer to lactation as needed  Outcome: Progressing  Goal: Incision(s), wounds(s) or drain site(s) healing without S/S of infection  Description: INTERVENTIONS  - Assess and document dressing, incision, wound bed, drain sites and surrounding tissue  - Provide patient and family education  - Perform skin care/dressing changes   Outcome: Progressing

## 2024-07-19 NOTE — PROGRESS NOTES
"Progress Note - OB/GYN   Verito Mata 39 y.o. female MRN: [id number]  Unit/Bed#: -01 Encounter: [id number]      Assessment/Plan    Verito Mata is a 39 y.o.  who is POD #3 s/p 1LTCS at 37w2d     S/P primary low transverse   Assessment & Plan  Routine postpartum care  Encourage ambulation  Encourage familial bonding  Lactation support as needed  Pain: Motrin/Tylenol around the clock, oxycodone if needed  Pre-delivery Hgb 12.7 --> Post Delivery 11.4  Alvares catheter: passed VT  DVT Ppx: ambulation, SCDs, Lovenox 40mg BID to start         History of smoking  Assessment & Plan  Stopping smoking once positive pregnancy     Elevated blood pressure reading without diagnosis of hypertension  Assessment & Plan  Single elevated BP of 144/84 at 30w  Continue monitor BPS  Admit CBC WNL    Systolic (12hrs), Av , Min:120 , Max:120   Diastolic (12hrs), Av, Min:76, Max:76        Pre-existing type 2 diabetes mellitus with hyperglycemia during pregnancy in third trimester (HCC)  Assessment & Plan  Plan per endo postpartum: no meds upon discharge, follow up with PCP in 3 months for repeat A1C    Lab Results   Component Value Date    HGBA1C 7.5 (H) 2024       No results for input(s): \"POCGLU\" in the last 72 hours.    Blood Sugar Average: Last 72 hrs:        Tetrahydrocannabinol (THC) use disorder, mild, abuse  Assessment & Plan  No use of THC once finding out pregnant           Disposition: Anticipate discharge home postpartum Day #4      Subjective/Objective     Subjective: Postpartum state    Pain: no  Tolerating PO: yes  Voiding: yes  Flatus: yes  BM: no  Ambulating: yes  Breastfeeding: Bottle feeding  Chest pain: no  Shortness of breath: no  Leg pain: no  Lochia: wnl    Objective:     Vitals:  Vitals:    24 1953 24 0041 24 0834 24 0026   BP: 129/79 129/76 115/63 120/76   BP Location: Left arm Right arm  Right arm   Pulse: 77 84 95 84   Resp: 17 16 18 16 "   Temp: 98 °F (36.7 °C) 98.1 °F (36.7 °C) 98.5 °F (36.9 °C) 98.2 °F (36.8 °C)   TempSrc: Oral Oral Oral Oral   SpO2: 96% 98% 96% 95%   Weight:       Height:           Physical Exam:   GEN: appears well, alert and oriented x 3, pleasant and cooperative   CV: Regular rate  RESP: non labored breathing  ABDOMEN: soft, no tenderness, no distention, Uterine fundus firm and non-tender, -1 cm below the umbilicus, incision c/d/i  EXTREMITIES: non-tender  NEURO Alert and oriented to person, place, and time.       Lab Results   Component Value Date    WBC 13.15 (H) 07/17/2024    HGB 11.4 (L) 07/17/2024    HCT 35.6 07/17/2024    MCV 83 07/17/2024     07/17/2024         Alexandra Trivedi DO  Obstetrics & Gynecology  07/19/24

## 2024-07-19 NOTE — DISCHARGE INSTR - AVS FIRST PAGE
Please follow up with a PCP in mid Oct (3 months) for a repeat A1c to see if you require any diabetes medications

## 2024-07-20 VITALS
BODY MASS INDEX: 46.1 KG/M2 | TEMPERATURE: 97.9 F | HEART RATE: 97 BPM | RESPIRATION RATE: 18 BRPM | WEIGHT: 270 LBS | DIASTOLIC BLOOD PRESSURE: 79 MMHG | OXYGEN SATURATION: 97 % | HEIGHT: 64 IN | SYSTOLIC BLOOD PRESSURE: 139 MMHG

## 2024-07-20 LAB
GLUCOSE SERPL-MCNC: 118 MG/DL (ref 65–140)
GLUCOSE SERPL-MCNC: 129 MG/DL (ref 65–140)

## 2024-07-20 PROCEDURE — 99024 POSTOP FOLLOW-UP VISIT: CPT | Performed by: STUDENT IN AN ORGANIZED HEALTH CARE EDUCATION/TRAINING PROGRAM

## 2024-07-20 PROCEDURE — NC001 PR NO CHARGE: Performed by: STUDENT IN AN ORGANIZED HEALTH CARE EDUCATION/TRAINING PROGRAM

## 2024-07-20 PROCEDURE — 82948 REAGENT STRIP/BLOOD GLUCOSE: CPT

## 2024-07-20 RX ORDER — POLYETHYLENE GLYCOL 3350 17 G/17G
17 POWDER, FOR SOLUTION ORAL DAILY
Start: 2024-07-20

## 2024-07-20 RX ORDER — OXYCODONE HYDROCHLORIDE 5 MG/1
5 TABLET ORAL EVERY 4 HOURS PRN
Qty: 5 TABLET | Refills: 0 | Status: SHIPPED | OUTPATIENT
Start: 2024-07-20 | End: 2024-07-30

## 2024-07-20 RX ORDER — ACETAMINOPHEN 325 MG/1
650 TABLET ORAL EVERY 6 HOURS PRN
Qty: 100 TABLET | Refills: 0 | Status: SHIPPED | OUTPATIENT
Start: 2024-07-20

## 2024-07-20 RX ORDER — IBUPROFEN 600 MG/1
600 TABLET ORAL EVERY 6 HOURS
Qty: 30 TABLET | Refills: 0 | Status: SHIPPED | OUTPATIENT
Start: 2024-07-20

## 2024-07-20 RX ORDER — BENZOCAINE/MENTHOL 6 MG-10 MG
1 LOZENGE MUCOUS MEMBRANE DAILY PRN
Start: 2024-07-20

## 2024-07-20 RX ADMIN — IBUPROFEN 600 MG: 600 TABLET, FILM COATED ORAL at 09:11

## 2024-07-20 RX ADMIN — OXYCODONE HYDROCHLORIDE 5 MG: 5 TABLET ORAL at 14:53

## 2024-07-20 RX ADMIN — LIDOCAINE 5% 1 PATCH: 700 PATCH TOPICAL at 01:31

## 2024-07-20 RX ADMIN — SENNOSIDES 8.6 MG: 8.6 TABLET, FILM COATED ORAL at 09:11

## 2024-07-20 RX ADMIN — OXYCODONE HYDROCHLORIDE 5 MG: 5 TABLET ORAL at 09:11

## 2024-07-20 RX ADMIN — IBUPROFEN 600 MG: 600 TABLET, FILM COATED ORAL at 14:53

## 2024-07-20 RX ADMIN — IBUPROFEN 600 MG: 600 TABLET, FILM COATED ORAL at 01:31

## 2024-07-20 RX ADMIN — ENOXAPARIN SODIUM 40 MG: 40 INJECTION SUBCUTANEOUS at 09:11

## 2024-07-20 NOTE — PROGRESS NOTES
Progress Note - OB/GYN   Verito Mata 39 y.o. female MRN: [id number]  Unit/Bed#: -01 Encounter: [id number]    Assessment:  Post partum Day #04 s/p 1LTCS, stable, baby in the room. Pt had one reading of elevated BP, monitoring BP in order to be discharged.      Plan:  History of smoking  Assessment & Plan  Stopping smoking once positive pregnancy     Elevated blood pressure reading without diagnosis of hypertension  Assessment & Plan  Single elevated BP of 144/84 at 30w  Continue monitor BPS  Admit CBC WNL  Systolic (12hrs), Av , Min:120 , Max:120   Diastolic (12hrs), Av, Min:69, Max:69      Pre-existing type 2 diabetes mellitus with hyperglycemia during pregnancy in third trimester (HCC)  Assessment & Plan  Plan per endo postpartum: no meds upon discharge, follow up with PCP in 3 months for repeat A1C    Lab Results   Component Value Date    HGBA1C 7.5 (H) 2024       Recent Labs     24  1655 24  2104 24  0647 24  1104   POCGLU 127 143* 98 103       Blood Sugar Average: Last 72 hrs:  (P) 116.1241211408291505      Tetrahydrocannabinol (THC) use disorder, mild, abuse  Assessment & Plan  No use of THC once finding out pregnant    * S/P primary low transverse   Assessment & Plan  Routine postpartum care  Encourage ambulation  Encourage familial bonding  Lactation support as needed  Pain: Motrin/Tylenol around the clock, oxycodone if needed  Pre-delivery Hgb 12.7 --> Post Delivery 11.4  Alvares catheter: passed VT  DVT Ppx: ambulation, SCDs, Lovenox 40mg BID (BMI 46)            Subjective/Objective   Chief Complaint:     Post delivery. Patient is doing well. Lochia WNL. Pain well controlled.     Subjective:     Pain: yes, cramping, improved with meds  Tolerating PO: yes  Voiding: yes  Flatus: yes  Ambulating: yes  Chest pain: no  Shortness of breath: no  Leg pain: no  Lochia: minimal    Objective:     Vitals: /79 (BP Location: Right arm)   Pulse 97   Temp  "97.9 °F (36.6 °C) (Oral)   Resp 18   Ht 5' 4\" (1.626 m)   Wt 122 kg (270 lb)   LMP 10/15/2023   SpO2 97%   Breastfeeding No   BMI 46.35 kg/m²     I/O         07/18 0701 07/19 0700 07/19 0701 07/20 0700 07/20 0701 07/21 0700    P.O.       IV Piggyback       Total Intake(mL/kg)       Urine (mL/kg/hr)       Total Output       Net                      Lab Results   Component Value Date    WBC 13.15 (H) 07/17/2024    HGB 11.4 (L) 07/17/2024    HCT 35.6 07/17/2024    MCV 83 07/17/2024     07/17/2024       Physical Exam:     Gen: AAOx3, NAD  CV: no acute distress  Lungs: no acute distress  Abd: Soft, non-tender, non-distended, no rebound or guarding  Uterine fundus firm and non-tender, 02 cm below the umbilicus. Incision c/d/I  Ext: Non tender    Erasmo Diaz MD  OBGYN PGY-1  7/20/2024  12:46 PM         "

## 2024-07-20 NOTE — PLAN OF CARE
Problem: PAIN - ADULT  Goal: Verbalizes/displays adequate comfort level or baseline comfort level  Description: Interventions:  - Encourage patient to monitor pain and request assistance  - Assess pain using appropriate pain scale  - Administer analgesics based on type and severity of pain and evaluate response  - Implement non-pharmacological measures as appropriate and evaluate response  - Consider cultural and social influences on pain and pain management  - Notify physician/advanced practitioner if interventions unsuccessful or patient reports new pain  7/20/2024 1505 by Miroslava Gonzales RN  Outcome: Completed  7/20/2024 0935 by Miroslava Gonzales RN  Outcome: Progressing     Problem: INFECTION - ADULT  Goal: Absence or prevention of progression during hospitalization  Description: INTERVENTIONS:  - Assess and monitor for signs and symptoms of infection  - Monitor lab/diagnostic results  - Monitor all insertion sites, i.e. indwelling lines, tubes, and drains  - Monitor endotracheal if appropriate and nasal secretions for changes in amount and color  - [place] appropriate cooling/warming therapies per order  - Administer medications as ordered  - Instruct and encourage patient and family to use good hand hygiene technique  - Identify and instruct in appropriate isolation precautions for identified infection/condition  7/20/2024 1505 by Miroslava Gonzales RN  Outcome: Completed  7/20/2024 0935 by Miroslava Gonzales RN  Outcome: Progressing     Problem: SAFETY ADULT  Goal: Patient will remain free of falls  Description: INTERVENTIONS:  - Educate patient/family on patient safety including physical limitations  - Instruct patient to call for assistance with activity   - Consult OT/PT to assist with strengthening/mobility   - Keep Call bell within reach  - Keep bed low and locked with side rails adjusted as appropriate  - Keep care items and personal belongings within reach  - Initiate and maintain comfort  rounds  - Make Fall Risk Sign visible to staff  - Apply yellow socks and bracelet for high fall risk patients  - Consider moving patient to room near nurses station  2024 1505 by Miroslava Gonzales RN  Outcome: Completed  2024 by Miroslava Gonzales RN  Outcome: Progressing     Problem: Knowledge Deficit  Goal: Patient/family/caregiver demonstrates understanding of disease process, treatment plan, medications, and discharge instructions  Description: Complete learning assessment and assess knowledge base.  Interventions:  - Provide teaching at level of understanding  - Provide teaching via preferred learning methods  2024 1505 by Miroslava Gonzales RN  Outcome: Completed  2024 by Miroslava Gonzales RN  Outcome: Progressing     Problem: DISCHARGE PLANNING  Goal: Discharge to home or other facility with appropriate resources  Description: INTERVENTIONS:  - Identify barriers to discharge w/patient and caregiver  - Arrange for needed discharge resources and transportation as appropriate  - Identify discharge learning needs (meds, wound care, etc.)  - Arrange for interpretive services to assist at discharge as needed  - Refer to Case Management Department for coordinating discharge planning if the patient needs post-hospital services based on physician/advanced practitioner order or complex needs related to functional status, cognitive ability, or social support system  2024 1505 by Miroslava Gonzales RN  Outcome: Completed  2024 by Miroslava Gonzales RN  Outcome: Progressing     Problem: POSTPARTUM  Goal: Experiences normal postpartum course  Description: INTERVENTIONS:  - Monitor maternal vital signs  - Assess uterine involution and lochia  2024 1505 by Miroslava Gonzales RN  Outcome: Completed  2024 by Miroslava Gonzales RN  Outcome: Progressing  Goal: Appropriate maternal -  bonding  Description: INTERVENTIONS:  - Identify family support  -  Assess for appropriate maternal/infant bonding   -Encourage maternal/infant bonding opportunities  - Referral to  or  as needed  7/20/2024 1505 by Miroslava Gonzales RN  Outcome: Completed  7/20/2024 0935 by Miroslava Gonzales RN  Outcome: Progressing  Goal: Establishment of infant feeding pattern  Description: INTERVENTIONS:  - Assess breast/bottle feeding  - Refer to lactation as needed  7/20/2024 1505 by Miroslava Gonzales RN  Outcome: Completed  7/20/2024 0935 by Miroslava Gonzales RN  Outcome: Progressing  Goal: Incision(s), wounds(s) or drain site(s) healing without S/S of infection  Description: INTERVENTIONS  - Assess and document dressing, incision, wound bed, drain sites and surrounding tissue  - Provide patient and family education  7/20/2024 1505 by Miroslava Gonzales RN  Outcome: Completed  7/20/2024 0935 by Miroslava Gonzales RN  Outcome: Progressing

## 2024-07-20 NOTE — PLAN OF CARE
Problem: PAIN - ADULT  Goal: Verbalizes/displays adequate comfort level or baseline comfort level  Description: Interventions:  - Encourage patient to monitor pain and request assistance  - Assess pain using appropriate pain scale  - Administer analgesics based on type and severity of pain and evaluate response  - Implement non-pharmacological measures as appropriate and evaluate response  - Consider cultural and social influences on pain and pain management  - Notify physician/advanced practitioner if interventions unsuccessful or patient reports new pain  Outcome: Progressing     Problem: INFECTION - ADULT  Goal: Absence or prevention of progression during hospitalization  Description: INTERVENTIONS:  - Assess and monitor for signs and symptoms of infection  - Monitor lab/diagnostic results  - Monitor all insertion sites, i.e. indwelling lines, tubes, and drains  - Monitor endotracheal if appropriate and nasal secretions for changes in amount and color  - [place] appropriate cooling/warming therapies per order  - Administer medications as ordered  - Instruct and encourage patient and family to use good hand hygiene technique  - Identify and instruct in appropriate isolation precautions for identified infection/condition  Outcome: Progressing     Problem: SAFETY ADULT  Goal: Patient will remain free of falls  Description: INTERVENTIONS:  - Educate patient/family on patient safety including physical limitations  - Instruct patient to call for assistance with activity   - Consult OT/PT to assist with strengthening/mobility   - Keep Call bell within reach  - Keep bed low and locked with side rails adjusted as appropriate  - Keep care items and personal belongings within reach  - Initiate and maintain comfort rounds  Outcome: Progressing     Problem: Knowledge Deficit  Goal: Patient/family/caregiver demonstrates understanding of disease process, treatment plan, medications, and discharge instructions  Description:  Complete learning assessment and assess knowledge base.  Interventions:  - Provide teaching at level of understanding  - Provide teaching via preferred learning methods  Outcome: Progressing     Problem: DISCHARGE PLANNING  Goal: Discharge to home or other facility with appropriate resources  Description: INTERVENTIONS:  - Identify barriers to discharge w/patient and caregiver  - Arrange for needed discharge resources and transportation as appropriate  - Identify discharge learning needs (meds, wound care, etc.)  - Arrange for interpretive services to assist at discharge as needed  - Refer to Case Management Department for coordinating discharge planning if the patient needs post-hospital services based on physician/advanced practitioner order or complex needs related to functional status, cognitive ability, or social support system  Outcome: Progressing     Problem: POSTPARTUM  Goal: Experiences normal postpartum course  Description: INTERVENTIONS:  - Monitor maternal vital signs  - Assess uterine involution and lochia  Outcome: Progressing  Goal: Appropriate maternal -  bonding  Description: INTERVENTIONS:  - Identify family support  - Assess for appropriate maternal/infant bonding   -Encourage maternal/infant bonding opportunities  - Referral to  or  as needed  Outcome: Progressing  Goal: Establishment of infant feeding pattern  Description: INTERVENTIONS:  - Assess breast/bottle feeding  - Refer to lactation as needed  Outcome: Progressing  Goal: Incision(s), wounds(s) or drain site(s) healing without S/S of infection  Description: INTERVENTIONS  - Assess and document dressing, incision, wound bed, drain sites and surrounding tissue  - Provide patient and family education  - Perform skin care/dressing changes   Outcome: Progressing

## 2024-07-20 NOTE — DISCHARGE SUMMARY
Obstetrics Discharge Summary   Verito Mata 39 y.o. female MRN: [id number]  Unit/Bed#: -01 Encounter: [id number]    Admission Date: 2024     Discharge Date: 2024    Admitting Diagnoses:   Elevated BP reading w/o dx of Hypertension  T2DM  H/O shoulder dystocia in prior pregnancy  37w gestation of pregnancy    Discharge Diagnoses:   Same, delivered      Procedures:   primary  section, low transverse incision      Admitting Attending: Dr. Cantrell  Delivery Attending:   Discharge Attending: Dr. Rust    Delivery  Route of Delivery: , Low Transverse  Reason for  delivery: Macrosomia;Other (Add Comments) Prior SD    Anesthesia: Spinal,   QBL: 511    Delivery: , Low Transverse at 2024 10:46 AM    Baby's Weight: 4010 g (8 lb 13.5 oz); 141.45    Apgar scores: 8  and 9  at 1 and 5 minutes, respectively    Hospital Course:   Verito Mata is now a 39 y.o.  who was initially admitted at 37w2d for elective LTCS due to history of T2DM and shoulder dystocia.       The patient's post partum course was  had one reading of elevated blood pressure, which did not recurred  , fingerstick glucose wnl f/u w/ endocrinology during postpartum period. Her preoperative hemoglobin was 12.7g/dL, postoperative was 11.4. Her postoperative pain was well controlled with oral analgesics.    On day of discharge, she was ambulating and able to reasonably perform all ADLs. She was voiding and had appropriate bowel function. Pain was well controlled. She was discharged home on post-operative day #04 without complications. Patient was instructed to follow up with her OB as an outpatient and was given appropriate warnings to call provider if she develops signs of infection or uncontrolled pain. Mom's blood type is O positive, so RhoGAM was notneed indicated.      Complications:   None    Condition at discharge:   good     Provisions for Follow-Up Care:  See after  visit summary for information related to follow-up care and any pertinent home health orders.      Disposition:   Home    Planned Readmission:   No    Discharge Medications:   Please see AVS for a complete list of discharge medications.    Discharge instructions :   Please see AVS for complete discharge instructions.    Erasmo Flood  OB GYN PGY1  07/20/24  2:00 PM

## 2024-07-20 NOTE — PLAN OF CARE
Problem: PAIN - ADULT  Goal: Verbalizes/displays adequate comfort level or baseline comfort level  Description: Interventions:  - Encourage patient to monitor pain and request assistance  - Assess pain using appropriate pain scale  - Administer analgesics based on type and severity of pain and evaluate response  - Implement non-pharmacological measures as appropriate and evaluate response  - Consider cultural and social influences on pain and pain management  - Notify physician/advanced practitioner if interventions unsuccessful or patient reports new pain  Outcome: Progressing     Problem: INFECTION - ADULT  Goal: Absence or prevention of progression during hospitalization  Description: INTERVENTIONS:  - Assess and monitor for signs and symptoms of infection  - Monitor lab/diagnostic results  - Monitor all insertion sites, i.e. indwelling lines, tubes, and drains  - Monitor endotracheal if appropriate and nasal secretions for changes in amount and color  - [place] appropriate cooling/warming therapies per order  - Administer medications as ordered  - Instruct and encourage patient and family to use good hand hygiene technique  - Identify and instruct in appropriate isolation precautions for identified infection/condition  Outcome: Progressing     Problem: SAFETY ADULT  Goal: Patient will remain free of falls  Description: INTERVENTIONS:  - Educate patient/family on patient safety including physical limitations  - Instruct patient to call for assistance with activity   - Consult OT/PT to assist with strengthening/mobility   - Keep Call bell within reach  - Keep bed low and locked with side rails adjusted as appropriate  - Keep care items and personal belongings within reach  - Initiate and maintain comfort rounds  - Make Fall Risk Sign visible to staff  - Apply yellow socks and bracelet for high fall risk patients  - Consider moving patient to room near nurses station  Outcome: Progressing     Problem: Knowledge  Deficit  Goal: Patient/family/caregiver demonstrates understanding of disease process, treatment plan, medications, and discharge instructions  Description: Complete learning assessment and assess knowledge base.  Interventions:  - Provide teaching at level of understanding  - Provide teaching via preferred learning methods  Outcome: Progressing     Problem: DISCHARGE PLANNING  Goal: Discharge to home or other facility with appropriate resources  Description: INTERVENTIONS:  - Identify barriers to discharge w/patient and caregiver  - Arrange for needed discharge resources and transportation as appropriate  - Identify discharge learning needs (meds, wound care, etc.)  - Arrange for interpretive services to assist at discharge as needed  - Refer to Case Management Department for coordinating discharge planning if the patient needs post-hospital services based on physician/advanced practitioner order or complex needs related to functional status, cognitive ability, or social support system  Outcome: Progressing     Problem: POSTPARTUM  Goal: Experiences normal postpartum course  Description: INTERVENTIONS:  - Monitor maternal vital signs  - Assess uterine involution and lochia  Outcome: Progressing  Goal: Appropriate maternal -  bonding  Description: INTERVENTIONS:  - Identify family support  - Assess for appropriate maternal/infant bonding   -Encourage maternal/infant bonding opportunities  - Referral to  or  as needed  Outcome: Progressing  Goal: Establishment of infant feeding pattern  Description: INTERVENTIONS:  - Assess breast/bottle feeding  - Refer to lactation as needed  Outcome: Progressing  Goal: Incision(s), wounds(s) or drain site(s) healing without S/S of infection  Description: INTERVENTIONS  - Assess and document dressing, incision, wound bed, drain sites and surrounding tissue  - Provide patient and family education  Outcome: Progressing

## 2024-07-23 PROCEDURE — 88307 TISSUE EXAM BY PATHOLOGIST: CPT | Performed by: STUDENT IN AN ORGANIZED HEALTH CARE EDUCATION/TRAINING PROGRAM

## 2024-07-29 ENCOUNTER — OFFICE VISIT (OUTPATIENT)
Dept: OBGYN CLINIC | Facility: CLINIC | Age: 39
End: 2024-07-29
Payer: COMMERCIAL

## 2024-07-29 VITALS — HEIGHT: 64 IN | WEIGHT: 240 LBS | OXYGEN SATURATION: 98 % | HEART RATE: 79 BPM | BODY MASS INDEX: 40.97 KG/M2

## 2024-07-29 DIAGNOSIS — G56.03 CARPAL TUNNEL SYNDROME, BILATERAL: Primary | ICD-10-CM

## 2024-07-29 PROCEDURE — 99203 OFFICE O/P NEW LOW 30 MIN: CPT | Performed by: PHYSICIAN ASSISTANT

## 2024-07-29 NOTE — PROGRESS NOTES
ASSESSMENT/PLAN:    Assessment:   Carpal Tunnel Syndrome  bilateral   - Symptoms began during pregnancy. Baby is 2weeks old now. Minimally improved symptoms so far.    Plan:   Night splints  Watchful waiting for 4 weeks to see if it resolves on its own    Follow Up:  Dr. Landin in 4-6 weeks  Pt will cancel if symptoms resolved    General Discussions:     Carpal Tunnel Syndrome: The anatomy and physiology of carpal tunnel syndrome was discussed with the patient today.  Increase pressure localized under the transverse carpal ligament can cause pain, numbness, tingling, or dysesthesias within the median nerve distribution as well as feelings of fatigue, clumsiness, or awkwardness.  These symptoms typically occur at night and worse in the morning upon waking.  Eventually, untreated carpal tunnel syndrome can result in weakness and permanent loss of muscle within the thenar compartment of the hand.  Treatment options were discussed with the patient.  Conservative treatment includes nocturnal resting splints to keep the nerve in a neutral position, ergonomic changes within the work or home environment, activity modification, and tendon gliding exercises.  Steroid injections within the carpal canal can help a majority of patients, however this is often self-limited in a majority of patients.  Surgical intervention to divide the transverse carpal ligament typically results in a long-lasting relief of the patient's complaints, with the recurrence rate of less than 1%.         _____________________________________________________  CHIEF COMPLAINT:  Chief Complaint   Patient presents with    Left Hand - Numbness, Pain, Tingling     2/2024    Right Hand - Numbness, Pain, Tingling     2/2024         SUBJECTIVE:  Verito Mata is a 39 y.o. female who presents with tingling dysesthesias of the bilateral hand.  This started  5 month(s) ago:  during pregnancy .    Radiation: Yes to the  index finger, long finger, and  thumb  Previous Treatments: None   Associated symptoms: weakness  Handedness: right  Work status: [employer]    PAST MEDICAL HISTORY:  Past Medical History:   Diagnosis Date    Anxiety     Depression     Ectopic pregnancy 2015    Miscarriage     , 2020 x 2    Type 2 diabetes mellitus affecting pregnancy in second trimester, antepartum        PAST SURGICAL HISTORY:  Past Surgical History:   Procedure Laterality Date    APPENDECTOMY  2014    DIAGNOSTIC LAPAROSCOPY Left 2015    ectopic- left (tube was cut, not removed per pt)    MULTIPLE TOOTH EXTRACTIONS      all teeth removed    [place]  DELIVERY ONLY N/A 2024    Procedure:  SECTION ();  Surgeon: Lynn Cantrell MD;  Location: [facility] ;  Service: Obstetrics       FAMILY HISTORY:  Family History   Problem Relation Age of Onset    Asthma Mother     Diabetes Mother     COPD Mother     Hepatitis Mother     Asthma Brother     Drug abuse Brother     No Known Problems Daughter     No Known Problems Son     Diabetes Maternal Grandmother     Diabetes Maternal Grandfather        SOCIAL HISTORY:  Social History     Tobacco Use    Smoking status: Former     Current packs/day: 0.00     Average packs/day: 0.5 packs/day for 20.0 years (10.0 ttl pk-yrs)     Types: Cigarettes     Quit date: 2023     Years since quittin.6    Smokeless tobacco: Never   Vaping Use    Vaping status: Never Used   Substance Use Topics    Alcohol use: Not Currently     Comment: occasionally- not since confirmed pregnancy    Drug use: Not Currently     Types: Marijuana     Comment: medical- quit 23 with confirmed pregnancy       MEDICATIONS:    Current Outpatient Medications:     ibuprofen (MOTRIN) 600 mg tablet, Take 1 tablet (600 mg total) by mouth every 6 (six) hours, Disp: 30 tablet, Rfl: 0    Prenatal Vit w/Kh-Dwvwqklmr-WF (PNV PO), Take by mouth, Disp: , Rfl:     acetaminophen (TYLENOL) 325 mg tablet, Take 2 tablets (650 mg total) by mouth every 6  (six) hours as needed for mild pain (Patient not taking: Reported on 7/29/2024), Disp: 100 tablet, Rfl: 0    benzocaine-menthol-lanolin-aloe (DERMOPLAST) 20-0.5 % topical spray, Apply 1 Application topically every 6 (six) hours as needed for mild pain (Patient not taking: Reported on 7/29/2024), Disp: , Rfl:     Blood Glucose Monitoring Suppl (Contour Next EZ) w/Device KIT, Dispense 1 kit per insurance formulary. GDM. (Patient not taking: Reported on 7/29/2024), Disp: 1 kit, Rfl: 0    Continuous Blood Gluc  (Dexcom G7 ) SIGRID, Use 1 Device continuous T2DM and pregnancy (Patient not taking: Reported on 7/29/2024), Disp: 1 each, Rfl: 0    Continuous Glucose Sensor (Dexcom G7 Sensor), USE 1 DEVICE EVERY 10 DAYS (Patient not taking: Reported on 7/29/2024), Disp: 3 each, Rfl: 2    hydrocortisone 1 % cream, Apply 1 Application topically daily as needed for irritation (Patient not taking: Reported on 7/29/2024), Disp: , Rfl:     Insulin Pen Needle 31G X 5 MM MISC, Use up to 5 pen needles a day. T2DM and pregnancy. (Patient not taking: Reported on 7/29/2024), Disp: 200 each, Rfl: 5    Microlet Lancets MISC, Use 4 a day. GDM (Patient not taking: Reported on 7/29/2024), Disp: 100 each, Rfl: 7    oxyCODONE (Roxicodone) 5 immediate release tablet, Take 1 tablet (5 mg total) by mouth every 4 (four) hours as needed for moderate pain for up to 10 days Max Daily Amount: 30 mg (Patient not taking: Reported on 7/29/2024), Disp: 5 tablet, Rfl: 0    polyethylene glycol (MIRALAX) 17 g packet, Take 17 g by mouth daily (Patient not taking: Reported on 7/29/2024), Disp: , Rfl:     witch hazel-glycerin (TUCKS) topical pad, Apply 1 Pad topically every 4 (four) hours as needed for irritation (Patient not taking: Reported on 7/29/2024), Disp: , Rfl:     ALLERGIES:  No Known Allergies    REVIEW OF SYSTEMS:  Pertinent items are noted in HPI.  A comprehensive review of systems was negative.    LABS:  HgA1c:   Lab Results  "  Component Value Date    HGBA1C 7.5 (H) 06/17/2024     BMP:   Lab Results   Component Value Date    CALCIUM 8.9 06/17/2024    K 3.7 06/17/2024    CO2 21 06/17/2024     06/17/2024    BUN 8 06/17/2024    CREATININE 0.48 (L) 06/17/2024         _____________________________________________________  PHYSICAL EXAMINATION:  Vital signs: Pulse 79   Ht 5' 4\" (1.626 m)   Wt 109 kg (240 lb)   LMP 10/15/2023   SpO2 98%   BMI 41.20 kg/m²   General: well developed and well nourished, alert, oriented times 3, and appears comfortable  Psychiatric: Normal  HEENT: Trachea Midline, No torticollis  Cardiovascular: Regular rate and rhythm  Pulmonary: No audible wheezing   Abdomen: No guarding  Extremities: No lymphedema  Skin: No masses, erythema, lacerations, fluctation, ulcerations  Neurovascular: Pulses Intact    MUSCULOSKELETAL EXAMINATION:  LEFT SIDE:  Carpal tunnel:  No weakness APB, No weakness AIN, No thenar atrophy, + Tinels, and + Phalens  RIGHT SIDE:  Carpal tunnel:  + Weakness APB, + Weakness AIN, + Tinels, and + Phalens        _____________________________________________________  STUDIES REVIEWED:  No Studies to review      PROCEDURES PERFORMED:  Procedures  No Procedures performed today    "

## 2024-08-06 ENCOUNTER — NURSE TRIAGE (OUTPATIENT)
Age: 39
End: 2024-08-06

## 2024-08-06 NOTE — TELEPHONE ENCOUNTER
"Post OP C/S 7/16/24, discharge POD #4 due to 1 elevated BP per patient.   Patient started conversation about her incision issue and at end of call mentioned about her HA  pain. C/o of migraine HA since discharge, patient states pain is behind eyes and forehead and at temple area both sides. Pain is throbbing, constant pain has been 8-9/10 for days, currently 7/10 but recently took Excedrin. Denies any swelling in hands, feet or face. No RUQ pain, no visual changes but does feel slightly dizzy when position changes. Does not have a working BP cuff at home.   Iincision feeling more irritated and sore, yellowish fluid with odor. Incision has been rubbing on clothing and she put a pad over, patient states when she removes this liner there is a streak of yellow fluid on pad , pad has an foul odor.Denies N/V, no fever. Vaginal spotting is light pink, no colts. Soreness and pain and cramping at incision site, when she feels she needs to use the bathroom is rates 9/10,  feels constipated and thinks this may be cause of increased pain.   Delivered @ [facility]   ESC to L&D surgeon Dr. Cantrell.   Dr Burch- agree with the recs- please send her to [place] ED     Phone call to patient and she will call her partner to take her to ED, patient not sure what time they will be arriving.       Reason for Disposition   Pus or bad-smelling fluid draining from incision    Answer Assessment - Initial Assessment Questions  1. SYMPTOM: \"What's the main symptom you're concerned about?\" (e.g., redness, pain, drainage)      C/S incision has been leaking from rubbing   2. ONSET: \"When did incisional discharge start?\"      Since discharge  3. SURGERY: \"What surgery was performed?\"      C/S   4. DATE of SURGERY: \"When was surgery performed?\"       7/16/24  5. INCISION SITE: \"Where is the incision located?\"       Low transfer   6. REDNESS: \"Is there any redness at the incision site?\" If yes, ask: \"How wide across is the redness?\" " "(Inches, centimeters)       unsure  7. PAIN: \"Is there any pain?\" If Yes, ask: \"How bad is it?\"  (Scale 1-10; or mild, moderate, severe)      Pain is   8. BLEEDING: \"Is there any bleeding?\" If Yes, ask: \"How much?\" and \"Where?\"      Some vaginal bleeding when she strains on toilet, no heavy, no clots just light pink spotting   9. DRAINAGE: \"Is there any drainage from the incision site?\" If yes, ask: \"What color and how much?\" (e.g., red, cloudy, pus; drops, teaspoon)      Yellow discharge with odor, no blood   10. FEVER: \"Do you have a fever?\" If Yes, ask: \"What is your temperature, how was it measured, and when did it start?\"        denies  11. OTHER SYMPTOMS: \"Do you have any other symptoms?\" (e.g., shaking chills, weakness, rash elsewhere on body)        Migraine HA since discharge, using Excedrin for pain    Protocols used: Post-Op Incision Symptoms and Questions-ADULT-[place]    "

## 2024-08-06 NOTE — TELEPHONE ENCOUNTER
Regarding: Post aprtum c/section comcerned w/ incision  ----- Message from Gaby MYERS sent at 8/6/2024  1:16 PM EDT -----  Patient called she had a c/section 7/16/24 she states she is concerned with her incision , she has not been able to see it , but she has been using a liner to cover the incision she changes it 3 to 4 times she states there is some fluid on the liner and the incision is sore - more than it was  after giving birth , no fever

## 2024-08-06 NOTE — TELEPHONE ENCOUNTER
Reason for Disposition  • Caller has already spoken with another triager and has no further questions.    Answer Assessment - Initial Assessment Questions  Call previously addressed by another CTS    Protocols used: No Contact or Duplicate Contact Call-ADULT-

## 2024-08-08 ENCOUNTER — POSTPARTUM VISIT (OUTPATIENT)
Dept: OBGYN CLINIC | Facility: MEDICAL CENTER | Age: 39
End: 2024-08-08
Payer: COMMERCIAL

## 2024-08-08 VITALS
HEIGHT: 64 IN | BODY MASS INDEX: 41.15 KG/M2 | SYSTOLIC BLOOD PRESSURE: 124 MMHG | DIASTOLIC BLOOD PRESSURE: 70 MMHG | TEMPERATURE: 97.9 F | WEIGHT: 241 LBS

## 2024-08-08 DIAGNOSIS — Z98.891 S/P PRIMARY LOW TRANSVERSE C-SECTION: Chronic | ICD-10-CM

## 2024-08-08 PROCEDURE — 99214 OFFICE O/P EST MOD 30 MIN: CPT | Performed by: CLINICAL NURSE SPECIALIST

## 2024-08-08 RX ORDER — IBUPROFEN 800 MG/1
800 TABLET, FILM COATED ORAL EVERY 8 HOURS PRN
Qty: 30 TABLET | Refills: 1 | Status: SHIPPED | OUTPATIENT
Start: 2024-08-08

## 2024-08-08 RX ORDER — CEPHALEXIN 500 MG/1
500 CAPSULE ORAL 4 TIMES DAILY
Qty: 40 CAPSULE | Refills: 0 | Status: SHIPPED | OUTPATIENT
Start: 2024-08-08 | End: 2024-08-18

## 2024-08-08 NOTE — PROGRESS NOTES
Assessment/Plan:         1.  section wound complication  Assessment & Plan:  C/S incision with a few areas that are  and + erythema and slightly foul smelling odor. See scanned image  Attempted to probe incision and there is no tunneling noted.  Abx given.  Instructed to wask once daily.  Keep folds of skin  to stay clean and dry. ABD pad placed today. A few given to her to take home. F/U next week or sooner if worse.   Orders:  -     cephalexin (KEFLEX) 500 mg capsule; Take 1 capsule (500 mg total) by mouth 4 (four) times a day for 10 days  2. S/P primary low transverse   Assessment & Plan:  3 wk s/p pLTCS due to macrosomia and uncontrolled DM.  Bldg minimal  Is having incisional pain- see other A&P  No bowel/bladder problems.  F/u in next wks for incision check     Orders:  -     ibuprofen (MOTRIN) 800 mg tablet; Take 1 tablet (800 mg total) by mouth every 8 (eight) hours as needed for mild pain          Subjective:   HPI     Patient ID: Verito Mata is a 39 y.o. female.  She presents for routine postpartum visit.   She is now a  who is 3wks s/p pLTCS on 24 at 37w2d due to uncontrolled DM and suspected macrosomia  Delivered a baby girl- 8lbs 13.5 oz    Antepartum complications include:  AMA  THC use disorder  Obesity in pregnancy/excess wt gain in pregnancy  Type 2 DM- not well controlled  Macrosomia  Elevated BP w/o Dx of HTN  Hx of tobacco use  Carpal tunnel syndrome in pregnancy    Since d/c home she has been having HA and drainage from incision - she called in on  and was planning to go the ED for assessment but did not f/u.pain is increasing and not decreasing. Reports + odor from incision    She denies abn bleeding, breast complaints, bladder dysfunction, depression/anx.   Is having some constipation.  Took a laxative which was effective.   Baby is thriving and is Bottle feeding    [place]  Depression Scale Total: 3  Plans for contraception:  "Declines           The following portions of the patient's history were reviewed and updated as appropriate: allergies, current medications, past family history, past medical history, past social history, past surgical history, and problem list.    Review of Systems             Objective:  /70 (BP Location: Left arm, Patient Position: Sitting, Cuff Size: Large)   Temp 97.9 °F (36.6 °C)   Ht 5' 4\" (1.626 m)   Wt 109 kg (241 lb)   LMP 10/15/2023   Breastfeeding No   BMI 41.37 kg/m²     Physical Exam  Constitutional:       General: She is not in acute distress.     Appearance: Normal appearance.   Genitourinary:      Vulva normal.      Right Labia: No rash, lesions or skin changes.     Left Labia: No lesions, skin changes or rash.     No vaginal discharge, erythema or ulceration.      Uterus is not enlarged (involuted).      Pelvic exam was performed with patient in the lithotomy position.   Cardiovascular:      Rate and Rhythm: Normal rate.   Pulmonary:      Effort: Pulmonary effort is normal.   Abdominal:      General: There is no distension.      Palpations: Abdomen is soft.       Musculoskeletal:         General: Normal range of motion.   Neurological:      Mental Status: She is alert and oriented to person, place, and time.   Skin:     General: Skin is warm and dry.   Psychiatric:         Mood and Affect: Mood normal.         Behavior: Behavior normal.                               "

## 2024-08-13 NOTE — ASSESSMENT & PLAN NOTE
C/S incision with a few areas that are  and + erythema and slightly foul smelling odor. See scanned image  Attempted to probe incision and there is no tunneling noted.  Abx given.  Instructed to wask once daily.  Keep folds of skin  to stay clean and dry. ABD pad placed today. A few given to her to take home. F/U next week or sooner if worse.

## 2024-08-13 NOTE — ASSESSMENT & PLAN NOTE
3 wk s/p pLTCS due to macrosomia and uncontrolled DM.  Bldg minimal  Is having incisional pain- see other A&P  No bowel/bladder problems.  F/u in next wks for incision check

## 2024-08-27 ENCOUNTER — TELEPHONE (OUTPATIENT)
Dept: OBGYN CLINIC | Facility: MEDICAL CENTER | Age: 39
End: 2024-08-27

## 2024-08-27 NOTE — TELEPHONE ENCOUNTER
BONNIE for Verito to call us back and let us know how she's doing and to reschedule the missed appointment

## 2025-02-10 ENCOUNTER — TELEMEDICINE (OUTPATIENT)
Dept: OTHER | Facility: HOSPITAL | Age: 40
End: 2025-02-10
Payer: COMMERCIAL

## 2025-02-10 VITALS — OXYGEN SATURATION: 99 % | HEART RATE: 98 BPM | TEMPERATURE: 97.6 F

## 2025-02-10 DIAGNOSIS — J20.9 ACUTE BRONCHITIS, UNSPECIFIED ORGANISM: Primary | ICD-10-CM

## 2025-02-10 DIAGNOSIS — J04.0 LARYNGITIS: ICD-10-CM

## 2025-02-10 PROBLEM — O99.210 OBESITY COMPLICATING PREGNANCY, CHILDBIRTH, OR PUERPERIUM, ANTEPARTUM: Status: RESOLVED | Noted: 2024-01-29 | Resolved: 2025-02-10

## 2025-02-10 PROBLEM — Z3A.37 37 WEEKS GESTATION OF PREGNANCY: Status: RESOLVED | Noted: 2023-12-26 | Resolved: 2025-02-10

## 2025-02-10 PROBLEM — O36.63X0 EXCESSIVE FETAL GROWTH AFFECTING MANAGEMENT OF PREGNANCY IN THIRD TRIMESTER: Status: RESOLVED | Noted: 2024-06-11 | Resolved: 2025-02-10

## 2025-02-10 PROBLEM — O99.210 MATERNAL OBESITY, ANTEPARTUM: Status: RESOLVED | Noted: 2024-03-26 | Resolved: 2025-02-10

## 2025-02-10 PROBLEM — O99.810 HYPERGLYCEMIA IN PREGNANCY: Status: RESOLVED | Noted: 2024-04-17 | Resolved: 2025-02-10

## 2025-02-10 PROBLEM — O09.523 MULTIGRAVIDA OF ADVANCED MATERNAL AGE IN THIRD TRIMESTER: Status: RESOLVED | Noted: 2024-01-29 | Resolved: 2025-02-10

## 2025-02-10 PROBLEM — O26.03 EXCESSIVE WEIGHT GAIN IN PREGNANCY IN THIRD TRIMESTER: Status: RESOLVED | Noted: 2024-07-09 | Resolved: 2025-02-10

## 2025-02-10 PROBLEM — O21.9 NAUSEA/VOMITING IN PREGNANCY: Status: RESOLVED | Noted: 2023-12-26 | Resolved: 2025-02-10

## 2025-02-10 PROCEDURE — 99213 OFFICE O/P EST LOW 20 MIN: CPT | Performed by: PHYSICIAN ASSISTANT

## 2025-02-10 RX ORDER — BENZONATATE 200 MG/1
200 CAPSULE ORAL 3 TIMES DAILY PRN
Qty: 20 CAPSULE | Refills: 0 | Status: SHIPPED | OUTPATIENT
Start: 2025-02-10

## 2025-02-10 RX ORDER — ALBUTEROL SULFATE 90 UG/1
1 INHALANT RESPIRATORY (INHALATION) EVERY 4 HOURS PRN
Qty: 6.7 G | Refills: 0 | Status: SHIPPED | OUTPATIENT
Start: 2025-02-10

## 2025-02-10 RX ORDER — METHYLPREDNISOLONE 4 MG/1
TABLET ORAL
Qty: 21 TABLET | Refills: 0 | Status: SHIPPED | OUTPATIENT
Start: 2025-02-10

## 2025-02-10 NOTE — PATIENT INSTRUCTIONS
"Thank you for choosing to trust [facility] with your care today. Virtual visits are very convenient, but do have some limitations. Schedule a follow-up appointment with your primary care physician for recheck in person in 2-3 days-especially if symptoms aren't improving. If you cannot see your PCP, you can schedule a follow up appointment at a [facility]. Go to the emergency department if you develop any new or worsening symptoms including shortness of breath, chest pain, fevers, or anything else that is concerning.    Excuses can be found in \"Letters\" section of [employer] tita. Can print if opened from a web browser  [facility] phone number is [phone] if you need assistance or have further questions    [phone]  Schedule or Reschedule Outpatient Testing - Option 2  Billing - Option 3  General Info - Option 4  [employer] Help - Option 5  Comprehensive Spine Program - Option 6    "

## 2025-02-10 NOTE — LETTER
February 10, 2025     Patient: Verito Mata   YOB: 1985   Date of Visit: 2/10/2025       To Whom it May Concern:    Verito Mata is under my professional care. She was seen virtually on 2/10/2025. She may return to work on 2/12/25 or when symptoms improving .    If you have any questions or concerns, please don't hesitate to call.         Sincerely,          Shannon D Severino, PA-C        CC: No Recipients

## 2025-02-10 NOTE — PROGRESS NOTES
Virtual Regular Visit  Name: Verito Mata      : 1985      MRN: [id number]  Encounter Provider: Shannon D Severino, PA-C  Encounter Date: 2/10/2025   Encounter department: VIRTUAL CARE       Verification of patient location:  Patient is located at Home in the following state in which I hold an active license [place] :  Assessment & Plan  Acute bronchitis, unspecified organism    Orders:    benzonatate (TESSALON) 200 MG capsule; Take 1 capsule (200 mg total) by mouth 3 (three) times a day as needed for cough    albuterol (Proventil HFA) 90 mcg/act inhaler; Inhale 1 puff every 4 (four) hours as needed for wheezing    methylPREDNISolone 4 MG tablet therapy pack; Use as directed on package    dextromethorphan-guaifenesin (MUCINEX DM)  MG per 12 hr tablet; Take 1 tablet by mouth every 12 (twelve) hours    Laryngitis    Orders:    methylPREDNISolone 4 MG tablet therapy pack; Use as directed on package        Encounter provider Shannon D Severino, PA-C    The patient was identified by name and date of birth. Verito Mata was informed that this is a telemedicine visit and that the visit is being conducted through the Epic Embedded platform. She agrees to proceed..  My office door was closed. No one else was in the room.  She acknowledged consent and understanding of privacy and security of the video platform. The patient has agreed to participate and understands they can discontinue the visit at any time.    Patient is aware this is a billable service.     History obtained from: patient  History of Present Illness     Pt repotrs she started feeling sick a few days ago. Yesterday started losing voice. Reports a lot of phlegm, sore throat, chest tightness, burning with coughing. Tylenol last night, dayquil without relief. No fever, wheezing, NVD.      Review of Systems   Constitutional:  Negative for fever.   HENT:  Positive for congestion and sore throat. Negative for nosebleeds.    Eyes:  Negative for  redness.   Respiratory:  Positive for cough, chest tightness and shortness of breath (with coughing).    Cardiovascular:  Negative for chest pain.   Gastrointestinal:  Negative for blood in stool.   Genitourinary:  Negative for hematuria.   Musculoskeletal:  Negative for gait problem.   Skin:  Negative for rash.   Neurological:  Negative for seizures.   Psychiatric/Behavioral:  Negative for behavioral problems.        Objective   Pulse 98   Temp 97.6 °F (36.4 °C)   SpO2 99%     Physical Exam  Constitutional:       General: She is not in acute distress.     Appearance: Normal appearance. She is not toxic-appearing.   HENT:      Head: Normocephalic and atraumatic.      Nose: No rhinorrhea.      Mouth/Throat:      Mouth: Mucous membranes are moist.      Comments: Tongue piercing. Severely hoarse voice  Eyes:      Conjunctiva/sclera: Conjunctivae normal.   Pulmonary:      Effort: Pulmonary effort is normal. No respiratory distress.      Breath sounds: No wheezing (no gross audible wheeze through computer).   Musculoskeletal:      Cervical back: Normal range of motion.   Skin:     Findings: No rash (on face or neck).   Neurological:      Mental Status: She is alert.      Cranial Nerves: No dysarthria or facial asymmetry.   Psychiatric:         Mood and Affect: Mood normal.         Behavior: Behavior normal.         Visit Time  Total Visit Duration: 10 minutes not including the time spent for establishing the audio/video connection.

## (undated) DEVICE — ADHESIVE SKN CLSR HISTOACRYL FLEX 0.5ML LF

## (undated) DEVICE — GLOVE INDICATOR PI UNDERGLOVE SZ 7 BLUE

## (undated) DEVICE — CHLORAPREP HI-LITE 26ML ORANGE

## (undated) DEVICE — TELFA NON-ADHERENT ABSORBENT DRESSING: Brand: TELFA

## (undated) DEVICE — MEDI-VAC YANKAUER SUCTION HANDLE W/STRAIGHT TIP & CONTROL VENT: Brand: CARDINAL HEALTH

## (undated) DEVICE — SUT MONOCRYL 3-0 UNDYED KS CS-1 Y523H

## (undated) DEVICE — SUT VICRYL 0 CT-1 27 IN J260H

## (undated) DEVICE — TELFA ADHESIVE ISLAND DRESSING: Brand: TELFA

## (undated) DEVICE — MICRO HVTSA, 0.5G AND HVTSA SOURCEMARK PRODUCT CODE M1206 AND M1206-01: Brand: EXOFIN MICRO HVTSA, 0.5G

## (undated) DEVICE — GLOVE SRG BIOGEL ECLIPSE 7

## (undated) DEVICE — SUT VICRYL 0 CTX 36 IN J978H

## (undated) DEVICE — ADHESIVE SKIN HIGH VISCOSITY EXOFIN 1ML

## (undated) DEVICE — EXOFIN PRECISION PEN HIGH VISCOSITY TOPICAL SKIN ADHESIVE: Brand: EXOFIN PRECISION PEN, 1G

## (undated) DEVICE — PACK C-SECTION PBDS

## (undated) DEVICE — Device

## (undated) DEVICE — SKIN MARKER DUAL TIP WITH RULER CAP, FLEXIBLE RULER AND LABELS: Brand: DEVON